# Patient Record
Sex: FEMALE | Race: WHITE | NOT HISPANIC OR LATINO | Employment: PART TIME | URBAN - METROPOLITAN AREA
[De-identification: names, ages, dates, MRNs, and addresses within clinical notes are randomized per-mention and may not be internally consistent; named-entity substitution may affect disease eponyms.]

---

## 2017-03-03 ENCOUNTER — TRANSCRIBE ORDERS (OUTPATIENT)
Dept: ADMINISTRATIVE | Facility: HOSPITAL | Age: 57
End: 2017-03-03

## 2017-03-03 ENCOUNTER — LAB (OUTPATIENT)
Dept: LAB | Facility: HOSPITAL | Age: 57
End: 2017-03-03

## 2017-03-03 ENCOUNTER — TRANSCRIBE ORDERS (OUTPATIENT)
Dept: LAB | Facility: HOSPITAL | Age: 57
End: 2017-03-03

## 2017-03-03 DIAGNOSIS — A31.0 PULMONARY DISEASES DUE TO OTHER MYCOBACTERIA: Primary | ICD-10-CM

## 2017-03-03 DIAGNOSIS — R19.7 DIARRHEA, UNSPECIFIED TYPE: ICD-10-CM

## 2017-03-03 DIAGNOSIS — R11.0 NAUSEA: ICD-10-CM

## 2017-03-03 DIAGNOSIS — R19.7 DIARRHEA, UNSPECIFIED TYPE: Primary | ICD-10-CM

## 2017-03-03 LAB
ALBUMIN SERPL-MCNC: 4.5 G/DL (ref 3.2–4.8)
ALBUMIN/GLOB SERPL: 1.6 G/DL (ref 1.5–2.5)
ALP SERPL-CCNC: 85 U/L (ref 25–100)
ALT SERPL W P-5'-P-CCNC: 21 U/L (ref 7–40)
ANION GAP SERPL CALCULATED.3IONS-SCNC: 5 MMOL/L (ref 3–11)
AST SERPL-CCNC: 23 U/L (ref 0–33)
BASOPHILS # BLD AUTO: 0.04 10*3/MM3 (ref 0–0.2)
BASOPHILS NFR BLD AUTO: 0.4 % (ref 0–1)
BILIRUB SERPL-MCNC: 0.2 MG/DL (ref 0.3–1.2)
BUN BLD-MCNC: 10 MG/DL (ref 9–23)
BUN/CREAT SERPL: 16.7 (ref 7–25)
CALCIUM SPEC-SCNC: 9.9 MG/DL (ref 8.7–10.4)
CHLORIDE SERPL-SCNC: 102 MMOL/L (ref 99–109)
CO2 SERPL-SCNC: 29 MMOL/L (ref 20–31)
CREAT BLD-MCNC: 0.6 MG/DL (ref 0.6–1.3)
DEPRECATED RDW RBC AUTO: 48.7 FL (ref 37–54)
EOSINOPHIL # BLD AUTO: 0.1 10*3/MM3 (ref 0.1–0.3)
EOSINOPHIL NFR BLD AUTO: 1 % (ref 0–3)
ERYTHROCYTE [DISTWIDTH] IN BLOOD BY AUTOMATED COUNT: 14.2 % (ref 11.3–14.5)
GFR SERPL CREATININE-BSD FRML MDRD: 104 ML/MIN/1.73
GLOBULIN UR ELPH-MCNC: 2.9 GM/DL
GLUCOSE BLD-MCNC: 71 MG/DL (ref 70–100)
HCT VFR BLD AUTO: 40 % (ref 34.5–44)
HGB BLD-MCNC: 13.4 G/DL (ref 11.5–15.5)
IMM GRANULOCYTES # BLD: 0.02 10*3/MM3 (ref 0–0.03)
IMM GRANULOCYTES NFR BLD: 0.2 % (ref 0–0.6)
LYMPHOCYTES # BLD AUTO: 3.96 10*3/MM3 (ref 0.6–4.8)
LYMPHOCYTES NFR BLD AUTO: 38.5 % (ref 24–44)
MCH RBC QN AUTO: 30.9 PG (ref 27–31)
MCHC RBC AUTO-ENTMCNC: 33.5 G/DL (ref 32–36)
MCV RBC AUTO: 92.4 FL (ref 80–99)
MONOCYTES # BLD AUTO: 0.7 10*3/MM3 (ref 0–1)
MONOCYTES NFR BLD AUTO: 6.8 % (ref 0–12)
NEUTROPHILS # BLD AUTO: 5.47 10*3/MM3 (ref 1.5–8.3)
NEUTROPHILS NFR BLD AUTO: 53.1 % (ref 41–71)
PLATELET # BLD AUTO: 426 10*3/MM3 (ref 150–450)
PMV BLD AUTO: 9.6 FL (ref 6–12)
POTASSIUM BLD-SCNC: 4.1 MMOL/L (ref 3.5–5.5)
PROT SERPL-MCNC: 7.4 G/DL (ref 5.7–8.2)
RBC # BLD AUTO: 4.33 10*6/MM3 (ref 3.89–5.14)
SODIUM BLD-SCNC: 136 MMOL/L (ref 132–146)
TSH SERPL DL<=0.05 MIU/L-ACNC: 1.96 MIU/ML (ref 0.35–5.35)
WBC NRBC COR # BLD: 10.29 10*3/MM3 (ref 3.5–10.8)

## 2017-03-03 PROCEDURE — 80050 GENERAL HEALTH PANEL: CPT | Performed by: INTERNAL MEDICINE

## 2017-03-03 PROCEDURE — 36415 COLL VENOUS BLD VENIPUNCTURE: CPT | Performed by: INTERNAL MEDICINE

## 2017-03-06 ENCOUNTER — HOSPITAL ENCOUNTER (OUTPATIENT)
Dept: CT IMAGING | Facility: HOSPITAL | Age: 57
Discharge: HOME | End: 2017-03-06
Admitting: INTERNAL MEDICINE

## 2017-03-06 DIAGNOSIS — A31.0 PULMONARY DISEASES DUE TO OTHER MYCOBACTERIA: ICD-10-CM

## 2017-03-06 PROCEDURE — 71250 CT THORAX DX C-: CPT

## 2017-06-27 ENCOUNTER — TRANSCRIBE ORDERS (OUTPATIENT)
Dept: ADMINISTRATIVE | Facility: HOSPITAL | Age: 57
End: 2017-06-27

## 2017-06-27 DIAGNOSIS — A31.0 MAIC (MYCOBACTERIUM AVIUM-INTRACELLULARE COMPLEX) (HCC): Primary | ICD-10-CM

## 2017-07-07 ENCOUNTER — APPOINTMENT (OUTPATIENT)
Dept: CT IMAGING | Facility: HOSPITAL | Age: 57
End: 2017-07-07

## 2017-07-10 ENCOUNTER — HOSPITAL ENCOUNTER (OUTPATIENT)
Dept: CT IMAGING | Facility: HOSPITAL | Age: 57
Discharge: HOME | End: 2017-07-10

## 2017-09-20 ENCOUNTER — HOSPITAL ENCOUNTER (OUTPATIENT)
Dept: CT IMAGING | Facility: HOSPITAL | Age: 57
Discharge: HOME | End: 2017-09-20
Admitting: INTERNAL MEDICINE

## 2017-09-20 DIAGNOSIS — A31.0 MAIC (MYCOBACTERIUM AVIUM-INTRACELLULARE COMPLEX) (HCC): ICD-10-CM

## 2017-09-20 PROCEDURE — 71250 CT THORAX DX C-: CPT

## 2017-11-15 ENCOUNTER — TRANSCRIBE ORDERS (OUTPATIENT)
Dept: ADMINISTRATIVE | Facility: HOSPITAL | Age: 57
End: 2017-11-15

## 2017-11-15 DIAGNOSIS — Z12.31 VISIT FOR SCREENING MAMMOGRAM: Primary | ICD-10-CM

## 2017-12-27 ENCOUNTER — HOSPITAL ENCOUNTER (OUTPATIENT)
Dept: MAMMOGRAPHY | Facility: HOSPITAL | Age: 57
Discharge: HOME | End: 2017-12-27

## 2018-01-24 ENCOUNTER — HOSPITAL ENCOUNTER (OUTPATIENT)
Dept: MAMMOGRAPHY | Facility: HOSPITAL | Age: 58
Discharge: HOME | End: 2018-01-24

## 2018-02-15 ENCOUNTER — TRANSCRIBE ORDERS (OUTPATIENT)
Dept: LAB | Facility: HOSPITAL | Age: 58
End: 2018-02-15

## 2018-02-15 ENCOUNTER — LAB (OUTPATIENT)
Dept: LAB | Facility: HOSPITAL | Age: 58
End: 2018-02-15

## 2018-02-15 DIAGNOSIS — A31.0 PULMONARY DISEASE DUE TO MYCOBACTERIA (HCC): Primary | ICD-10-CM

## 2018-02-15 DIAGNOSIS — A31.0 PULMONARY DISEASE DUE TO MYCOBACTERIA (HCC): ICD-10-CM

## 2018-02-15 LAB
ALBUMIN SERPL-MCNC: 4.2 G/DL (ref 3.2–4.8)
ALP SERPL-CCNC: 71 U/L (ref 25–100)
ALT SERPL W P-5'-P-CCNC: 18 U/L (ref 7–40)
AST SERPL-CCNC: 20 U/L (ref 0–33)
BILIRUB CONJ SERPL-MCNC: 0.1 MG/DL (ref 0–0.2)
BILIRUB INDIRECT SERPL-MCNC: 0.1 MG/DL (ref 0.1–1.1)
BILIRUB SERPL-MCNC: 0.2 MG/DL (ref 0.3–1.2)
PROT SERPL-MCNC: 6.2 G/DL (ref 5.7–8.2)

## 2018-02-15 PROCEDURE — 36415 COLL VENOUS BLD VENIPUNCTURE: CPT

## 2018-02-15 PROCEDURE — 80076 HEPATIC FUNCTION PANEL: CPT

## 2018-03-06 ENCOUNTER — HOSPITAL ENCOUNTER (OUTPATIENT)
Dept: MAMMOGRAPHY | Facility: HOSPITAL | Age: 58
End: 2018-03-06

## 2018-04-05 ENCOUNTER — HOSPITAL ENCOUNTER (OUTPATIENT)
Dept: MAMMOGRAPHY | Facility: HOSPITAL | Age: 58
Discharge: HOME | End: 2018-04-05

## 2018-04-05 DIAGNOSIS — Z12.31 VISIT FOR SCREENING MAMMOGRAM: ICD-10-CM

## 2018-09-05 ENCOUNTER — TRANSCRIBE ORDERS (OUTPATIENT)
Dept: PREADMISSION TESTING | Facility: HOSPITAL | Age: 58
End: 2018-09-05

## 2018-09-05 DIAGNOSIS — J44.9 OBSTRUCTIVE CHRONIC BRONCHITIS WITHOUT EXACERBATION (HCC): Primary | ICD-10-CM

## 2018-10-22 ENCOUNTER — TRANSCRIBE ORDERS (OUTPATIENT)
Dept: ADMINISTRATIVE | Facility: HOSPITAL | Age: 58
End: 2018-10-22

## 2018-10-22 ENCOUNTER — HOSPITAL ENCOUNTER (OUTPATIENT)
Dept: GENERAL RADIOLOGY | Facility: HOSPITAL | Age: 58
Discharge: HOME | End: 2018-10-22
Admitting: INTERNAL MEDICINE

## 2018-10-22 DIAGNOSIS — R07.89 CHEST PAIN, ATYPICAL: Primary | ICD-10-CM

## 2018-10-22 PROCEDURE — 71046 X-RAY EXAM CHEST 2 VIEWS: CPT

## 2018-12-03 ENCOUNTER — APPOINTMENT (OUTPATIENT)
Dept: CT IMAGING | Facility: HOSPITAL | Age: 58
End: 2018-12-03

## 2018-12-07 ENCOUNTER — TRANSCRIBE ORDERS (OUTPATIENT)
Dept: ADMINISTRATIVE | Facility: HOSPITAL | Age: 58
End: 2018-12-07

## 2018-12-20 ENCOUNTER — TRANSCRIBE ORDERS (OUTPATIENT)
Dept: ADMINISTRATIVE | Facility: HOSPITAL | Age: 58
End: 2018-12-20

## 2018-12-20 DIAGNOSIS — J44.9 CHRONIC OBSTRUCTIVE PULMONARY DISEASE, UNSPECIFIED COPD TYPE (HCC): Primary | ICD-10-CM

## 2018-12-20 DIAGNOSIS — A31.9: ICD-10-CM

## 2018-12-27 ENCOUNTER — APPOINTMENT (OUTPATIENT)
Dept: CT IMAGING | Facility: HOSPITAL | Age: 58
End: 2018-12-27

## 2019-02-18 ENCOUNTER — APPOINTMENT (OUTPATIENT)
Dept: CT IMAGING | Facility: HOSPITAL | Age: 59
End: 2019-02-18

## 2019-02-25 ENCOUNTER — APPOINTMENT (OUTPATIENT)
Dept: CT IMAGING | Facility: HOSPITAL | Age: 59
End: 2019-02-25

## 2019-03-14 ENCOUNTER — HOSPITAL ENCOUNTER (OUTPATIENT)
Dept: GENERAL RADIOLOGY | Facility: HOSPITAL | Age: 59
Discharge: HOME | End: 2019-03-14
Admitting: NURSE PRACTITIONER

## 2019-03-14 ENCOUNTER — TRANSCRIBE ORDERS (OUTPATIENT)
Dept: ADMINISTRATIVE | Facility: HOSPITAL | Age: 59
End: 2019-03-14

## 2019-03-14 DIAGNOSIS — A31.9 MYCOBACTERIAL INFECTION: ICD-10-CM

## 2019-03-14 DIAGNOSIS — J44.9 CHRONIC OBSTRUCTIVE PULMONARY DISEASE, UNSPECIFIED COPD TYPE (HCC): Primary | ICD-10-CM

## 2019-03-14 PROCEDURE — 71046 X-RAY EXAM CHEST 2 VIEWS: CPT

## 2019-05-14 ENCOUNTER — TRANSCRIBE ORDERS (OUTPATIENT)
Dept: ADMINISTRATIVE | Facility: HOSPITAL | Age: 59
End: 2019-05-14

## 2019-05-14 DIAGNOSIS — R06.00 DYSPNEA, UNSPECIFIED TYPE: Primary | ICD-10-CM

## 2019-05-14 DIAGNOSIS — R06.02 SHORTNESS OF BREATH: ICD-10-CM

## 2019-05-14 DIAGNOSIS — A31.9 MYCOBACTERIOSIS: ICD-10-CM

## 2019-05-14 DIAGNOSIS — J43.9 EMPHYSEMATOUS BLEB (HCC): ICD-10-CM

## 2019-05-21 ENCOUNTER — HOSPITAL ENCOUNTER (OUTPATIENT)
Dept: CT IMAGING | Facility: HOSPITAL | Age: 59
Discharge: HOME | End: 2019-05-21
Admitting: NURSE PRACTITIONER

## 2019-05-21 DIAGNOSIS — J43.9 EMPHYSEMATOUS BLEB (HCC): ICD-10-CM

## 2019-05-21 DIAGNOSIS — A31.9 MYCOBACTERIOSIS: ICD-10-CM

## 2019-05-21 DIAGNOSIS — R06.02 SHORTNESS OF BREATH: ICD-10-CM

## 2019-05-21 DIAGNOSIS — R06.00 DYSPNEA, UNSPECIFIED TYPE: ICD-10-CM

## 2019-05-21 PROCEDURE — G0297 LDCT FOR LUNG CA SCREEN: HCPCS

## 2020-01-09 ENCOUNTER — TRANSCRIBE ORDERS (OUTPATIENT)
Dept: ADMINISTRATIVE | Facility: HOSPITAL | Age: 60
End: 2020-01-09

## 2020-01-09 DIAGNOSIS — A31.9 MYCOBACTERIOSIS: ICD-10-CM

## 2020-01-09 DIAGNOSIS — J43.9 PULMONARY EMPHYSEMA, UNSPECIFIED EMPHYSEMA TYPE (HCC): ICD-10-CM

## 2020-01-09 DIAGNOSIS — F17.210 CIGARETTE SMOKER: Primary | ICD-10-CM

## 2020-01-09 DIAGNOSIS — J47.9 ADULT BRONCHIECTASIS (HCC): Primary | ICD-10-CM

## 2020-01-09 DIAGNOSIS — J44.9 CHRONIC OBSTRUCTIVE PULMONARY DISEASE, UNSPECIFIED COPD TYPE (HCC): ICD-10-CM

## 2020-06-15 ENCOUNTER — HOSPITAL ENCOUNTER (OUTPATIENT)
Dept: CT IMAGING | Facility: HOSPITAL | Age: 60
Discharge: HOME | End: 2020-06-15

## 2020-06-15 DIAGNOSIS — F17.210 CIGARETTE SMOKER: ICD-10-CM

## 2020-06-23 ENCOUNTER — HOSPITAL ENCOUNTER (OUTPATIENT)
Dept: CT IMAGING | Facility: HOSPITAL | Age: 60
Discharge: HOME | End: 2020-06-23
Admitting: NURSE PRACTITIONER

## 2020-06-23 PROCEDURE — G0297 LDCT FOR LUNG CA SCREEN: HCPCS

## 2020-08-27 ENCOUNTER — LAB (OUTPATIENT)
Dept: LAB | Facility: HOSPITAL | Age: 60
End: 2020-08-27

## 2020-08-27 ENCOUNTER — TRANSCRIBE ORDERS (OUTPATIENT)
Dept: LAB | Facility: HOSPITAL | Age: 60
End: 2020-08-27

## 2020-08-27 DIAGNOSIS — A31.0 PULMONARY DISEASE DUE TO MYCOBACTERIA (HCC): Primary | ICD-10-CM

## 2020-08-27 DIAGNOSIS — A31.0 PULMONARY DISEASE DUE TO MYCOBACTERIA (HCC): ICD-10-CM

## 2020-08-27 LAB
ALBUMIN SERPL-MCNC: 4.1 G/DL (ref 3.5–5.2)
ALBUMIN/GLOB SERPL: 1.5 G/DL
ALP SERPL-CCNC: 88 U/L (ref 39–117)
ALT SERPL W P-5'-P-CCNC: 15 U/L (ref 1–33)
ANION GAP SERPL CALCULATED.3IONS-SCNC: 9.8 MMOL/L (ref 5–15)
AST SERPL-CCNC: 14 U/L (ref 1–32)
BASOPHILS # BLD AUTO: 0.05 10*3/MM3 (ref 0–0.2)
BASOPHILS NFR BLD AUTO: 0.6 % (ref 0–1.5)
BILIRUB SERPL-MCNC: 0.2 MG/DL (ref 0–1.2)
BUN SERPL-MCNC: 6 MG/DL (ref 6–20)
BUN/CREAT SERPL: 12.5 (ref 7–25)
CALCIUM SPEC-SCNC: 9.8 MG/DL (ref 8.6–10.5)
CHLORIDE SERPL-SCNC: 99 MMOL/L (ref 98–107)
CO2 SERPL-SCNC: 24.2 MMOL/L (ref 22–29)
CREAT SERPL-MCNC: 0.48 MG/DL (ref 0.57–1)
DEPRECATED RDW RBC AUTO: 46.3 FL (ref 37–54)
EOSINOPHIL # BLD AUTO: 0.25 10*3/MM3 (ref 0–0.4)
EOSINOPHIL NFR BLD AUTO: 2.9 % (ref 0.3–6.2)
ERYTHROCYTE [DISTWIDTH] IN BLOOD BY AUTOMATED COUNT: 14.1 % (ref 12.3–15.4)
GFR SERPL CREATININE-BSD FRML MDRD: 132 ML/MIN/1.73
GLOBULIN UR ELPH-MCNC: 2.8 GM/DL
GLUCOSE SERPL-MCNC: 89 MG/DL (ref 65–99)
HCT VFR BLD AUTO: 36.3 % (ref 34–46.6)
HGB BLD-MCNC: 12.4 G/DL (ref 12–15.9)
IMM GRANULOCYTES # BLD AUTO: 0.02 10*3/MM3 (ref 0–0.05)
IMM GRANULOCYTES NFR BLD AUTO: 0.2 % (ref 0–0.5)
LYMPHOCYTES # BLD AUTO: 2.55 10*3/MM3 (ref 0.7–3.1)
LYMPHOCYTES NFR BLD AUTO: 29.9 % (ref 19.6–45.3)
MCH RBC QN AUTO: 30.6 PG (ref 26.6–33)
MCHC RBC AUTO-ENTMCNC: 34.2 G/DL (ref 31.5–35.7)
MCV RBC AUTO: 89.6 FL (ref 79–97)
MONOCYTES # BLD AUTO: 0.79 10*3/MM3 (ref 0.1–0.9)
MONOCYTES NFR BLD AUTO: 9.3 % (ref 5–12)
NEUTROPHILS NFR BLD AUTO: 4.87 10*3/MM3 (ref 1.7–7)
NEUTROPHILS NFR BLD AUTO: 57.1 % (ref 42.7–76)
NRBC BLD AUTO-RTO: 0 /100 WBC (ref 0–0.2)
PLATELET # BLD AUTO: 447 10*3/MM3 (ref 140–450)
PMV BLD AUTO: 10.1 FL (ref 6–12)
POTASSIUM SERPL-SCNC: 4.3 MMOL/L (ref 3.5–5.2)
PROT SERPL-MCNC: 6.9 G/DL (ref 6–8.5)
RBC # BLD AUTO: 4.05 10*6/MM3 (ref 3.77–5.28)
SODIUM SERPL-SCNC: 133 MMOL/L (ref 136–145)
WBC # BLD AUTO: 8.53 10*3/MM3 (ref 3.4–10.8)

## 2020-08-27 PROCEDURE — 36415 COLL VENOUS BLD VENIPUNCTURE: CPT

## 2020-08-27 PROCEDURE — 85025 COMPLETE CBC W/AUTO DIFF WBC: CPT

## 2020-08-27 PROCEDURE — 80053 COMPREHEN METABOLIC PANEL: CPT

## 2021-03-03 ENCOUNTER — TRANSCRIBE ORDERS (OUTPATIENT)
Dept: ADMINISTRATIVE | Facility: HOSPITAL | Age: 61
End: 2021-03-03

## 2021-03-03 DIAGNOSIS — A31.0 MYCOBACTERIUM INTRACELLULARE INFECTION (HCC): Primary | ICD-10-CM

## 2021-03-03 DIAGNOSIS — J45.40 MODERATE PERSISTENT ASTHMA, UNCOMPLICATED: ICD-10-CM

## 2021-03-03 DIAGNOSIS — J43.9 PULMONARY EMPHYSEMA, UNSPECIFIED EMPHYSEMA TYPE (HCC): ICD-10-CM

## 2021-03-09 ENCOUNTER — HOSPITAL ENCOUNTER (OUTPATIENT)
Dept: CT IMAGING | Facility: HOSPITAL | Age: 61
Discharge: HOME | End: 2021-03-09
Admitting: NURSE PRACTITIONER

## 2021-03-09 DIAGNOSIS — J45.40 MODERATE PERSISTENT ASTHMA, UNCOMPLICATED: ICD-10-CM

## 2021-03-09 DIAGNOSIS — A31.0 MYCOBACTERIUM INTRACELLULARE INFECTION (HCC): ICD-10-CM

## 2021-03-09 DIAGNOSIS — J43.9 PULMONARY EMPHYSEMA, UNSPECIFIED EMPHYSEMA TYPE (HCC): ICD-10-CM

## 2021-03-09 PROCEDURE — 71250 CT THORAX DX C-: CPT

## 2021-03-24 ENCOUNTER — TRANSCRIBE ORDERS (OUTPATIENT)
Dept: NUTRITION | Facility: HOSPITAL | Age: 61
End: 2021-03-24

## 2021-03-24 DIAGNOSIS — R63.4 ABNORMAL WEIGHT LOSS: Primary | ICD-10-CM

## 2021-04-21 ENCOUNTER — HOSPITAL ENCOUNTER (OUTPATIENT)
Dept: NUTRITION | Facility: HOSPITAL | Age: 61
Setting detail: RECURRING SERIES
Discharge: HOME | End: 2021-04-21

## 2021-04-21 VITALS — HEIGHT: 66 IN | BODY MASS INDEX: 16.39 KG/M2 | WEIGHT: 102 LBS

## 2021-04-21 PROCEDURE — 97802 MEDICAL NUTRITION INDIV IN: CPT

## 2021-04-21 NOTE — CONSULTS
Adult Outpatient Nutrition  Assessment/PES    Patient Name:  Kristen Jean  YOB: 1960  MRN: [id number]    Assessment Date:  4/21/2021    Comments:      This medical referred consult was provided via telehealth as patient was unable to attend an in-office appointment today due to the COVID-19 crisis. Consent for treatment was given verbally. RD spent a total of 60 minutes with patient today.      Patient is a 60 year old seen today for an initial nutrition visit related to weight gain strategies. Patient reported she has struggled to gain weight her entire life and is seeking support to promote healthy weight gain. She reported a history of struggling with high cholesterol, so she wants to gain weight while keeping saturated fat and trans fat intake at a minimum. Patient shared she lives alone and has a h/o asthma, COPD emphysema, MAC infection in lungs, and GERD. She shared a majority of her meals are easy to prepare because she does not like to cooks. She reported she eats fast food about 1-2 times per month. Patient also reported she currently smokes 5-6 cigarettes daily and has for several years. She stated it has been recommended she quit smoking several times. Health literacy assessment completed prior to visit today and she demonstrated adequate health literacy.     24-hour dietary recall:   6:00 am: packet instant oatmeal, 1 container of activia yogurt, tbsp dried cranberries, 1 cup coffee with 2% milk  9:00 am: 1 scrambled egg, 1 tsp butter, 2 slices white toast with butter and honey  11:30 am: large salad with lettuce, black olives, celery, cabbage, and carrots, 3 tbsp ranch, 3-4 slices deli turkey, slice of                      swiss cheese, 2 slices bread, 2-3 oz potato chips  2:00 pm: slice sweet potato pie with cup coffee with 2% milk  6:00 pm: large salad with lettuce, black olives, celery, cabbage, and carrots, 3 tbsp ranch, 1/2 cup beans, 1 cup pasta with                 butter  8:00  pm: 4 sugar cookies with frosting, 4 fl oz milk  Typical day? YES   Estimated calorie intake: 2914 calories     Per dietary recall and patient interview, patient is eating about 2900 calories daily and struggling to gain weight. RD discussed several ideas to add additional heart healthy calories into her diet that are rich in lean protein and unsaturated fats. RD made the following suggestions: add 2 tbsp peanut butter or another nut butter into oatmeal, swap activity for a full fat greek yogurt, add 1/2-1 avocado onto toast with egg, swap potato chips for a higher calorie and higher protein snack, such as apple with peanut butter, full fat cheese stick, nuts, greek yogurt tuna fish packet with crackers. RD also suggested patient swap regular pasta for chickpea pasta to add more protein into diet. Patient suggested she could add avocado into her salad and drizzle more olive oil on top of her salad for additional calories. We also discussed adding beans or lentils into her salad for more protein. RD also suggested several protein shakes and high calorie meal replacement shakes the patient could try in-between meals. Patient stated the ideas given were very helpful and very realistic. Patient set her own goal and stated she feels confident she can accomplish it. RD encouraged patient to contact her with any needs prior to our the next visit.       Goals:  1) Add at least 1-2 servings of heart healthy fat and lean protein with every meal.     Total of 60 minutes spent with patient on nutrition counseling. Education based on [employer] Diet[employer] and Nutrition guidelines. Patient was provided with RD's contact information. Follow up visit is scheduled for 5/27/21 at 9:15 am. Thank you for this referral.    General Info     Row Name 04/21/21 7635       Today's Session    Person(s) attending today's session  Patient     Services Used Today?  No       General Information    How Well Do You Speak English?  very  "well    Preferred Language  English    Are you able to read and write English?  Yes    Lives With  alone          Anthropometrics     Row Name 04/21/21 1628 04/21/21 1621       Anthropometrics    Height  167.6 cm (66\")  167.6 cm (66\")    Weight  --  46.3 kg (102 lb)       Ideal Body Weight (IBW)    Ideal Body Weight (IBW) (kg)  59.58  59.58    % Ideal Body Weight  --  77.66       Body Mass Index (BMI)    BMI (kg/m2)  --  16.5        Nutritional Info/Activity     Row Name 04/21/21 1621       Nutritional Information    Have you had weight changes?  No    What is your desired body weight?  -- 120-125 lbs    Have you tried to lose weight before?  Yes    List programs tried, date, and success  Fast food and other high calorie foods with no success    Food Allergies  -- salmon    Supplemental Drinks/Foods/Additives  none    History of eating disorder?  No    What cultural diet influences are important for you to follow?  none    Do you have difficulty chewing food?  No    Functional Status  able to prepare meals;able to purchase food    How often during the day do you find yourself snacking?  rarely    How often do you eat out and where?  Fast food 1-2 times per month    Do you use Food Assistance programs (WIC, food stamps, food bank)?  no    Do you need information about Food Assistance programs?  no    How many times do you drink milk per day?  3    How many times do you eat fruit per day?  2    How many times do you eat vegetables per day?  2    How many times do you drink juice per day?  0    How many times do you eat candy/chocolates per day?  1    How many times do you eat baked goods per day?  2    How many times do you eat desserts per day?  2    How many times do you eat ice cream per day?  0    How many times do you eat snack foods per day?  0    How many diet sodas do you drink per day?  0    How many regular sodas do you drink per day?  0    How many times do you eat ethnic food per day?  0    How many times " "do you drink alcohol per day?  0    How many times do you have caffeine per day?  2    How many servings of artificial sweetner do you have per day?  0    How many meals do you eat each day?  -- 4-5    How many snacks do you eat each day?  0    What is the biggest challenge you have with your diet?  Other (comment) Feeling full    What type of support do you currently use to help you with your health issues?  Friends       Eating Environment    Eating environment  Alone       Physical Activity    Are you currently involved in an activity/exercise program?   Yes    Describe physical activity  Walking three times per week for about 30 minutes at a time    How would you rank exercise as an important health lifestyle practice?  9        Home Nutrition Report     Row Name 04/21/21 1621          Home Nutrition Report    Supplemental Drinks/Foods/Additives  none         Estimated/Assessed Needs     Row Name 04/21/21 1628 04/21/21 1621       Calculation Measurements    Weight Used For Calculations  46.3 kg (102 lb)  --    Height  167.6 cm (66\")  167.6 cm (66\")       Estimated/Assessed Needs    Additional Documentation  [place]-St. Jeor Equation (Group)  --       [place]-St. Jeor Equation    RMR ([place]-St. Jeor Equation)  1054.42  --    [place]-St. Jeor Activity Factors  1.2  --    Activity Factors ([place]-St. Jeor)  1265.304  --                Problem/Interventions:  Problem 1     Row Name 04/21/21 1628          Nutrition Diagnoses Problem 1    Problem 1  Underweight     Etiology (related to)  -- lifestyle     Signs/Symptoms (evidenced by)  BMI     BMI  16 - 16.9                 Intervention Goal     Row Name 04/21/21 1629          Intervention Goal    General  Meet nutritional needs for age/condition     Weight  Appropriate weight gain             Education/Evaluation     Row Name 04/21/21 1629          Education    Education  Advised regarding habits/behavior     Advised Regarding Habits/Behavior  Increased nutrient " density;Appropriate portions;Meal planning;Eating out;Eating pattern;Food choices;Snacks;Food prep;Use supplement;Weight gain strategy        Monitor/Evaluation    Monitor  PO intake;Weight     Education Follow-up  Reinforce PRN           Electronically signed by:  Desire Levine RD  04/21/21 16:30 EDT

## 2021-04-22 NOTE — ADDENDUM NOTE
Encounter addended by: Desire Levine RD on: 4/22/2021 10:36 AM   Actions taken: Clinical Note Signed

## 2021-05-27 ENCOUNTER — HOSPITAL ENCOUNTER (OUTPATIENT)
Dept: NUTRITION | Facility: HOSPITAL | Age: 61
Setting detail: RECURRING SERIES
Discharge: HOME | End: 2021-05-27

## 2021-05-27 NOTE — PROGRESS NOTES
Adult Outpatient Nutrition  Assessment/PES    Patient Name:  Kristen Jean  YOB: 1960  MRN: [id number]    This medical referred consult was provided via telehealth as patient was unable to attend an in-office appointment today due to the COVID-19 crisis. Consent for treatment was given verbally. RD spent a total of 30 minutes with patient today.     Pt reports today via phone for her 1 month follow up. She states that she implemented all of the suggestions from her initial visit and continues to not be able see any weight loss. She is frustrated as she feels like she is eating so much. She is continuing to decrease her cigarette usage, is planning to start nicorette soon to attempt cessation. RD spoke with pt about how with the current state of her health- a hx of COPD/emphysema and MAC lung infection, her metabolism is extremely increased as her body is working extra hard to allow her lungs to function properly and to breathe. This is a contributor to her rapid UWL. She agrees with this. RD spoke with pt about implementing a few new tips for weight loss. RD suggested Boost VHC as a shake to try, as well as some high calorie smoothies. Patient states she has been using nutrition shakes in place of water and milk in things like oatmeal or cereal. RD shared some very high calorie protein bars with pt as well to implement between meals as a quick way to increase the calorie intake. Resources sent out to patient today and she plans to begin implementing. RD spoke with pt about ensuring she is eating plenty of protein at her meals and snacks as we want to do what we can to preserve her muscle mass that is left and at the very least, maintain weight and not lose anymore. Patient verbalizes understanding. She plans to get started with these suggestions and has scheduled a continued follow up for 6/17/2021 @ 915 AM.     Goals from initial:    1. Add at least 1-2 servings of heart healthy fat and lean protein  with every meal-100% met       Electronically signed by:  Iesha Mahan RD  05/27/21 09:35 EDT

## 2021-07-12 ENCOUNTER — TRANSCRIBE ORDERS (OUTPATIENT)
Dept: ADMINISTRATIVE | Facility: HOSPITAL | Age: 61
End: 2021-07-12

## 2021-07-12 ENCOUNTER — HOSPITAL ENCOUNTER (OUTPATIENT)
Dept: GENERAL RADIOLOGY | Facility: HOSPITAL | Age: 61
Discharge: HOME | End: 2021-07-12
Admitting: INTERNAL MEDICINE

## 2021-07-12 DIAGNOSIS — M62.838 SPASM OF MUSCLE: ICD-10-CM

## 2021-07-12 DIAGNOSIS — M15.9 GENERALIZED OSTEOARTHRITIS: Primary | ICD-10-CM

## 2021-07-12 DIAGNOSIS — M25.572 PAIN IN JOINT, ANKLE AND FOOT, LEFT: ICD-10-CM

## 2021-07-12 DIAGNOSIS — M25.571 PAIN IN JOINT, ANKLE AND FOOT, RIGHT: ICD-10-CM

## 2021-07-12 PROCEDURE — 73610 X-RAY EXAM OF ANKLE: CPT

## 2021-09-09 ENCOUNTER — TRANSCRIBE ORDERS (OUTPATIENT)
Dept: ADMINISTRATIVE | Facility: HOSPITAL | Age: 61
End: 2021-09-09

## 2021-09-09 DIAGNOSIS — A31.0 MYCOBACTERIUM AVIUM INFECTION (HCC): ICD-10-CM

## 2021-09-09 DIAGNOSIS — R93.89 ABNORMAL CT OF THE CHEST: ICD-10-CM

## 2021-09-09 DIAGNOSIS — R93.89 ABNORMAL FINDINGS ON DIAGNOSTIC IMAGING OF OTHER SPECIFIED BODY STRUCTURES: Primary | ICD-10-CM

## 2021-10-19 ENCOUNTER — HOSPITAL ENCOUNTER (OUTPATIENT)
Dept: CT IMAGING | Facility: HOSPITAL | Age: 61
Discharge: HOME | End: 2021-10-19
Admitting: NURSE PRACTITIONER

## 2021-10-19 DIAGNOSIS — R93.89 ABNORMAL CT OF THE CHEST: ICD-10-CM

## 2021-10-19 DIAGNOSIS — A31.0 MYCOBACTERIUM AVIUM INFECTION (HCC): ICD-10-CM

## 2021-10-19 PROCEDURE — 71250 CT THORAX DX C-: CPT

## 2022-05-03 ENCOUNTER — TRANSCRIBE ORDERS (OUTPATIENT)
Dept: ADMINISTRATIVE | Facility: HOSPITAL | Age: 62
End: 2022-05-03

## 2022-05-03 DIAGNOSIS — A31.0 MYCOBACTERIUM AVIUM COMPLEX: Primary | ICD-10-CM

## 2022-05-25 ENCOUNTER — HOSPITAL ENCOUNTER (OUTPATIENT)
Dept: GENERAL RADIOLOGY | Facility: HOSPITAL | Age: 62
Discharge: HOME | End: 2022-05-25
Admitting: NURSE PRACTITIONER

## 2022-05-25 PROCEDURE — 71046 X-RAY EXAM CHEST 2 VIEWS: CPT

## 2022-05-31 ENCOUNTER — TRANSCRIBE ORDERS (OUTPATIENT)
Dept: ADMINISTRATIVE | Facility: HOSPITAL | Age: 62
End: 2022-05-31

## 2022-05-31 DIAGNOSIS — J44.9 ASTHMA-COPD OVERLAP SYNDROME: Primary | ICD-10-CM

## 2022-05-31 DIAGNOSIS — A31.0 MYCOBACTERIUM AVIUM COMPLEX: ICD-10-CM

## 2022-06-21 ENCOUNTER — HOSPITAL ENCOUNTER (OUTPATIENT)
Dept: CT IMAGING | Facility: HOSPITAL | Age: 62
Discharge: HOME | End: 2022-06-21
Admitting: NURSE PRACTITIONER

## 2022-06-21 DIAGNOSIS — A31.0 MYCOBACTERIUM AVIUM COMPLEX: ICD-10-CM

## 2022-06-21 DIAGNOSIS — J44.9 ASTHMA-COPD OVERLAP SYNDROME: ICD-10-CM

## 2022-06-21 PROCEDURE — 71250 CT THORAX DX C-: CPT

## 2022-08-08 ENCOUNTER — TRANSCRIBE ORDERS (OUTPATIENT)
Dept: ADMINISTRATIVE | Facility: HOSPITAL | Age: 62
End: 2022-08-08

## 2022-08-08 DIAGNOSIS — A31.0 MYCOBACTERIUM AVIUM COMPLEX: Primary | ICD-10-CM

## 2022-09-15 ENCOUNTER — TRANSCRIBE ORDERS (OUTPATIENT)
Dept: ADMINISTRATIVE | Facility: HOSPITAL | Age: 62
End: 2022-09-15

## 2022-09-15 DIAGNOSIS — R91.8 MULTIPLE LUNG NODULES: Primary | ICD-10-CM

## 2022-09-15 DIAGNOSIS — R91.8 MULTIPLE LUNG NODULES ON CT: ICD-10-CM

## 2022-09-15 DIAGNOSIS — R91.8 MULTIPLE LUNG NODULES ON CT: Primary | ICD-10-CM

## 2022-09-20 ENCOUNTER — HOSPITAL ENCOUNTER (OUTPATIENT)
Dept: CT IMAGING | Facility: HOSPITAL | Age: 62
Discharge: HOME | End: 2022-09-20
Admitting: NURSE PRACTITIONER

## 2022-09-20 DIAGNOSIS — R91.8 MULTIPLE LUNG NODULES ON CT: ICD-10-CM

## 2022-09-20 PROCEDURE — 71250 CT THORAX DX C-: CPT

## 2022-09-21 ENCOUNTER — TRANSCRIBE ORDERS (OUTPATIENT)
Dept: ADMINISTRATIVE | Facility: HOSPITAL | Age: 62
End: 2022-09-21

## 2022-09-21 DIAGNOSIS — R91.8 MULTIPLE LUNG NODULES ON CT: Primary | ICD-10-CM

## 2022-10-04 ENCOUNTER — HOSPITAL ENCOUNTER (OUTPATIENT)
Dept: CT IMAGING | Facility: HOSPITAL | Age: 62
Discharge: HOME | End: 2022-10-04
Admitting: PHYSICIAN ASSISTANT

## 2022-10-04 ENCOUNTER — TRANSCRIBE ORDERS (OUTPATIENT)
Dept: ADMINISTRATIVE | Facility: HOSPITAL | Age: 62
End: 2022-10-04

## 2022-10-04 DIAGNOSIS — R10.31 CHRONIC RLQ PAIN: Primary | ICD-10-CM

## 2022-10-04 DIAGNOSIS — R10.31 CHRONIC RLQ PAIN: ICD-10-CM

## 2022-10-04 DIAGNOSIS — G89.29 CHRONIC RLQ PAIN: Primary | ICD-10-CM

## 2022-10-04 DIAGNOSIS — G89.29 CHRONIC RLQ PAIN: ICD-10-CM

## 2022-10-04 PROCEDURE — 0 DIATRIZOATE MEGLUMINE & SODIUM PER 1 ML: Performed by: PHYSICIAN ASSISTANT

## 2022-10-04 PROCEDURE — 74176 CT ABD & PELVIS W/O CONTRAST: CPT

## 2022-10-04 RX ADMIN — DIATRIZOATE MEGLUMINE AND DIATRIZOATE SODIUM 15 ML: 660; 100 LIQUID ORAL; RECTAL at 14:40

## 2022-11-08 ENCOUNTER — TRANSCRIBE ORDERS (OUTPATIENT)
Dept: ADMINISTRATIVE | Facility: HOSPITAL | Age: 62
End: 2022-11-08

## 2022-11-08 DIAGNOSIS — A31.0 MYCOBACTERIUM AVIUM COMPLEX: Primary | ICD-10-CM

## 2022-11-08 DIAGNOSIS — R91.8 MULTIPLE LUNG NODULES ON CT: ICD-10-CM

## 2022-11-22 ENCOUNTER — APPOINTMENT (OUTPATIENT)
Dept: CT IMAGING | Facility: HOSPITAL | Age: 62
End: 2022-11-22

## 2022-12-02 ENCOUNTER — APPOINTMENT (OUTPATIENT)
Dept: CT IMAGING | Facility: HOSPITAL | Age: 62
End: 2022-12-02

## 2022-12-07 ENCOUNTER — APPOINTMENT (OUTPATIENT)
Dept: CT IMAGING | Facility: HOSPITAL | Age: 62
End: 2022-12-07

## 2022-12-09 ENCOUNTER — APPOINTMENT (OUTPATIENT)
Dept: CT IMAGING | Facility: HOSPITAL | Age: 62
End: 2022-12-09

## 2022-12-16 ENCOUNTER — HOSPITAL ENCOUNTER (OUTPATIENT)
Dept: CT IMAGING | Facility: HOSPITAL | Age: 62
Discharge: HOME | End: 2022-12-16

## 2022-12-16 DIAGNOSIS — R91.8 MULTIPLE LUNG NODULES ON CT: ICD-10-CM

## 2022-12-16 DIAGNOSIS — A31.0 MYCOBACTERIUM AVIUM COMPLEX: ICD-10-CM

## 2023-11-03 ENCOUNTER — HOSPITAL ENCOUNTER (OUTPATIENT)
Dept: GENERAL RADIOLOGY | Facility: HOSPITAL | Age: 63
Discharge: HOME | End: 2023-11-03
Admitting: INTERNAL MEDICINE
Payer: COMMERCIAL

## 2023-11-03 ENCOUNTER — TRANSCRIBE ORDERS (OUTPATIENT)
Dept: ADMINISTRATIVE | Facility: HOSPITAL | Age: 63
End: 2023-11-03
Payer: COMMERCIAL

## 2023-11-03 DIAGNOSIS — M54.2 CERVICALGIA: Primary | ICD-10-CM

## 2023-11-03 PROCEDURE — 72040 X-RAY EXAM NECK SPINE 2-3 VW: CPT

## 2025-01-09 ENCOUNTER — APPOINTMENT (OUTPATIENT)
Dept: CT IMAGING | Facility: HOSPITAL | Age: 65
DRG: 193 | End: 2025-01-09
Payer: COMMERCIAL

## 2025-01-09 ENCOUNTER — APPOINTMENT (OUTPATIENT)
Dept: GENERAL RADIOLOGY | Facility: HOSPITAL | Age: 65
DRG: 193 | End: 2025-01-09
Payer: COMMERCIAL

## 2025-01-09 ENCOUNTER — HOSPITAL ENCOUNTER (INPATIENT)
Facility: HOSPITAL | Age: 65
LOS: 11 days | Discharge: HOME | DRG: 193 | End: 2025-01-20
Attending: EMERGENCY MEDICINE | Admitting: STUDENT IN AN ORGANIZED HEALTH CARE EDUCATION/TRAINING PROGRAM
Payer: COMMERCIAL

## 2025-01-09 DIAGNOSIS — J44.9 END STAGE COPD: ICD-10-CM

## 2025-01-09 DIAGNOSIS — D72.825 BANDEMIA: ICD-10-CM

## 2025-01-09 DIAGNOSIS — J44.89 ASTHMA-COPD OVERLAP SYNDROME: ICD-10-CM

## 2025-01-09 DIAGNOSIS — D75.839 THROMBOCYTOSIS: ICD-10-CM

## 2025-01-09 DIAGNOSIS — I47.19 ATRIAL TACHYCARDIA: ICD-10-CM

## 2025-01-09 DIAGNOSIS — J43.2 CENTRILOBULAR EMPHYSEMA: ICD-10-CM

## 2025-01-09 DIAGNOSIS — J41.1 MUCOPURULENT CHRONIC BRONCHITIS: ICD-10-CM

## 2025-01-09 DIAGNOSIS — I70.0 AORTIC ATHEROSCLEROSIS: ICD-10-CM

## 2025-01-09 DIAGNOSIS — Z86.19 HISTORY OF MAC INFECTION: ICD-10-CM

## 2025-01-09 DIAGNOSIS — E03.9 HYPOTHYROIDISM (ACQUIRED): ICD-10-CM

## 2025-01-09 DIAGNOSIS — A31.0 MYCOBACTERIUM AVIUM COMPLEX: ICD-10-CM

## 2025-01-09 DIAGNOSIS — I50.32 HEART FAILURE WITH IMPROVED EJECTION FRACTION (HFIMPEF): ICD-10-CM

## 2025-01-09 DIAGNOSIS — E43 SEVERE PROTEIN-CALORIE MALNUTRITION: ICD-10-CM

## 2025-01-09 DIAGNOSIS — R64 CACHEXIA: ICD-10-CM

## 2025-01-09 DIAGNOSIS — J18.9 MULTIFOCAL PNEUMONIA: Primary | ICD-10-CM

## 2025-01-09 DIAGNOSIS — A31.9 MYCOBACTERIUM INFECTION: ICD-10-CM

## 2025-01-09 DIAGNOSIS — E87.1 HYPONATREMIA: ICD-10-CM

## 2025-01-09 DIAGNOSIS — J18.9 PNEUMONIA OF RIGHT UPPER LOBE DUE TO INFECTIOUS ORGANISM: ICD-10-CM

## 2025-01-09 DIAGNOSIS — I48.0 PAROXYSMAL ATRIAL FIBRILLATION: ICD-10-CM

## 2025-01-09 PROBLEM — J43.9 EMPHYSEMA OF LUNG: Status: ACTIVE | Noted: 2025-01-09

## 2025-01-09 PROBLEM — F41.9 ANXIETY DISORDER: Status: ACTIVE | Noted: 2025-01-09

## 2025-01-09 PROBLEM — E46 PROTEIN CALORIE MALNUTRITION: Status: ACTIVE | Noted: 2025-01-09

## 2025-01-09 PROBLEM — E87.6 HYPOKALEMIA: Status: ACTIVE | Noted: 2025-01-09

## 2025-01-09 PROBLEM — E11.9 T2DM (TYPE 2 DIABETES MELLITUS): Status: ACTIVE | Noted: 2025-01-09

## 2025-01-09 LAB
ALBUMIN SERPL-MCNC: 3.2 G/DL (ref 3.5–5.2)
ALBUMIN/GLOB SERPL: 1.1 G/DL
ALP SERPL-CCNC: 201 U/L (ref 39–117)
ALT SERPL W P-5'-P-CCNC: 21 U/L (ref 1–33)
ANION GAP SERPL CALCULATED.3IONS-SCNC: 10 MMOL/L (ref 5–15)
AST SERPL-CCNC: 29 U/L (ref 1–32)
BASOPHILS # BLD AUTO: 0.04 10*3/MM3 (ref 0–0.2)
BASOPHILS NFR BLD AUTO: 0.2 % (ref 0–1.5)
BILIRUB SERPL-MCNC: 0.3 MG/DL (ref 0–1.2)
BUN SERPL-MCNC: 5 MG/DL (ref 8–23)
BUN/CREAT SERPL: 16.1 (ref 7–25)
CALCIUM SPEC-SCNC: 9.1 MG/DL (ref 8.6–10.5)
CHLORIDE SERPL-SCNC: 90 MMOL/L (ref 98–107)
CHOLEST SERPL-MCNC: 144 MG/DL (ref 0–200)
CK SERPL-CCNC: 52 U/L (ref 20–180)
CO2 SERPL-SCNC: 27 MMOL/L (ref 22–29)
CREAT SERPL-MCNC: 0.31 MG/DL (ref 0.57–1)
CRP SERPL-MCNC: 13.13 MG/DL (ref 0–0.5)
DEPRECATED RDW RBC AUTO: 42.9 FL (ref 37–54)
EGFRCR SERPLBLD CKD-EPI 2021: 118.4 ML/MIN/1.73
EOSINOPHIL # BLD AUTO: 0.42 10*3/MM3 (ref 0–0.4)
EOSINOPHIL NFR BLD AUTO: 2.5 % (ref 0.3–6.2)
ERYTHROCYTE [DISTWIDTH] IN BLOOD BY AUTOMATED COUNT: 14.8 % (ref 12.3–15.4)
ERYTHROCYTE [SEDIMENTATION RATE] IN BLOOD: 113 MM/HR (ref 0–30)
GEN 5 1HR TROPONIN T REFLEX: 12 NG/L
GLOBULIN UR ELPH-MCNC: 2.9 GM/DL
GLUCOSE SERPL-MCNC: 131 MG/DL (ref 65–99)
HBA1C MFR BLD: 6 % (ref 4.8–5.6)
HCT VFR BLD AUTO: 30.4 % (ref 34–46.6)
HDLC SERPL-MCNC: 41 MG/DL (ref 40–60)
HGB BLD-MCNC: 10.1 G/DL (ref 12–15.9)
HOLD SPECIMEN: NORMAL
HOLD SPECIMEN: NORMAL
IMM GRANULOCYTES # BLD AUTO: 0.09 10*3/MM3 (ref 0–0.05)
IMM GRANULOCYTES NFR BLD AUTO: 0.5 % (ref 0–0.5)
LDLC SERPL CALC-MCNC: 88 MG/DL (ref 0–100)
LDLC/HDLC SERPL: 2.15 {RATIO}
LIPASE SERPL-CCNC: 18 U/L (ref 13–60)
LYMPHOCYTES # BLD AUTO: 1.74 10*3/MM3 (ref 0.7–3.1)
LYMPHOCYTES NFR BLD AUTO: 10.4 % (ref 19.6–45.3)
MCH RBC QN AUTO: 26.5 PG (ref 26.6–33)
MCHC RBC AUTO-ENTMCNC: 33.2 G/DL (ref 31.5–35.7)
MCV RBC AUTO: 79.8 FL (ref 79–97)
MONOCYTES # BLD AUTO: 1.5 10*3/MM3 (ref 0.1–0.9)
MONOCYTES NFR BLD AUTO: 9 % (ref 5–12)
NEUTROPHILS NFR BLD AUTO: 12.88 10*3/MM3 (ref 1.7–7)
NEUTROPHILS NFR BLD AUTO: 77.4 % (ref 42.7–76)
NRBC BLD AUTO-RTO: 0 /100 WBC (ref 0–0.2)
NT-PROBNP SERPL-MCNC: 264.7 PG/ML (ref 0–900)
PLATELET # BLD AUTO: 493 10*3/MM3 (ref 140–450)
PMV BLD AUTO: 9.1 FL (ref 6–12)
POTASSIUM SERPL-SCNC: 3.4 MMOL/L (ref 3.5–5.2)
PROCALCITONIN SERPL-MCNC: 0.09 NG/ML (ref 0–0.25)
PROT SERPL-MCNC: 6.1 G/DL (ref 6–8.5)
QT INTERVAL: 290 MS
QT INTERVAL: 322 MS
QTC INTERVAL: 395 MS
QTC INTERVAL: 408 MS
RBC # BLD AUTO: 3.81 10*6/MM3 (ref 3.77–5.28)
SODIUM SERPL-SCNC: 127 MMOL/L (ref 136–145)
T4 FREE SERPL-MCNC: 1.36 NG/DL (ref 0.92–1.68)
TRIGL SERPL-MCNC: 74 MG/DL (ref 0–150)
TROPONIN T NUMERIC DELTA: 1 NG/L
TROPONIN T SERPL HS-MCNC: 11 NG/L
TSH SERPL DL<=0.05 MIU/L-ACNC: 0.16 UIU/ML (ref 0.27–4.2)
VLDLC SERPL-MCNC: 15 MG/DL (ref 5–40)
WBC NRBC COR # BLD AUTO: 16.67 10*3/MM3 (ref 3.4–10.8)
WHOLE BLOOD HOLD COAG: NORMAL
WHOLE BLOOD HOLD SPECIMEN: NORMAL

## 2025-01-09 PROCEDURE — 83690 ASSAY OF LIPASE: CPT

## 2025-01-09 PROCEDURE — 85652 RBC SED RATE AUTOMATED: CPT

## 2025-01-09 PROCEDURE — 82550 ASSAY OF CK (CPK): CPT

## 2025-01-09 PROCEDURE — 80061 LIPID PANEL: CPT

## 2025-01-09 PROCEDURE — 94799 UNLISTED PULMONARY SVC/PX: CPT

## 2025-01-09 PROCEDURE — 94640 AIRWAY INHALATION TREATMENT: CPT

## 2025-01-09 PROCEDURE — 84439 ASSAY OF FREE THYROXINE: CPT

## 2025-01-09 PROCEDURE — 71045 X-RAY EXAM CHEST 1 VIEW: CPT

## 2025-01-09 PROCEDURE — 71250 CT THORAX DX C-: CPT

## 2025-01-09 PROCEDURE — 99285 EMERGENCY DEPT VISIT HI MDM: CPT

## 2025-01-09 PROCEDURE — 99223 1ST HOSP IP/OBS HIGH 75: CPT

## 2025-01-09 PROCEDURE — 84145 PROCALCITONIN (PCT): CPT

## 2025-01-09 PROCEDURE — 25010000002 CEFEPIME PER 500 MG: Performed by: EMERGENCY MEDICINE

## 2025-01-09 PROCEDURE — 83880 ASSAY OF NATRIURETIC PEPTIDE: CPT

## 2025-01-09 PROCEDURE — 93005 ELECTROCARDIOGRAM TRACING: CPT

## 2025-01-09 PROCEDURE — 83036 HEMOGLOBIN GLYCOSYLATED A1C: CPT

## 2025-01-09 PROCEDURE — 80050 GENERAL HEALTH PANEL: CPT

## 2025-01-09 PROCEDURE — 25010000002 LINEZOLID 600 MG/300ML SOLUTION: Performed by: EMERGENCY MEDICINE

## 2025-01-09 PROCEDURE — 36415 COLL VENOUS BLD VENIPUNCTURE: CPT

## 2025-01-09 PROCEDURE — 84484 ASSAY OF TROPONIN QUANT: CPT

## 2025-01-09 PROCEDURE — 86140 C-REACTIVE PROTEIN: CPT

## 2025-01-09 RX ORDER — LINEZOLID 2 MG/ML
600 INJECTION, SOLUTION INTRAVENOUS EVERY 12 HOURS
Status: DISCONTINUED | OUTPATIENT
Start: 2025-01-10 | End: 2025-01-11

## 2025-01-09 RX ORDER — IPRATROPIUM BROMIDE AND ALBUTEROL SULFATE 2.5; .5 MG/3ML; MG/3ML
3 SOLUTION RESPIRATORY (INHALATION)
Status: DISCONTINUED | OUTPATIENT
Start: 2025-01-09 | End: 2025-01-19

## 2025-01-09 RX ORDER — BENZONATATE 100 MG/1
100 CAPSULE ORAL 3 TIMES DAILY PRN
Status: DISCONTINUED | OUTPATIENT
Start: 2025-01-09 | End: 2025-01-20 | Stop reason: HOSPADM

## 2025-01-09 RX ORDER — BISACODYL 10 MG
10 SUPPOSITORY, RECTAL RECTAL DAILY PRN
Status: DISCONTINUED | OUTPATIENT
Start: 2025-01-09 | End: 2025-01-20 | Stop reason: HOSPADM

## 2025-01-09 RX ORDER — NITROGLYCERIN 0.4 MG/1
0.4 TABLET SUBLINGUAL
Status: DISCONTINUED | OUTPATIENT
Start: 2025-01-09 | End: 2025-01-20 | Stop reason: HOSPADM

## 2025-01-09 RX ORDER — SODIUM CHLORIDE, SODIUM LACTATE, POTASSIUM CHLORIDE, CALCIUM CHLORIDE 600; 310; 30; 20 MG/100ML; MG/100ML; MG/100ML; MG/100ML
100 INJECTION, SOLUTION INTRAVENOUS CONTINUOUS
Status: DISCONTINUED | OUTPATIENT
Start: 2025-01-09 | End: 2025-01-09

## 2025-01-09 RX ORDER — LINEZOLID 2 MG/ML
600 INJECTION, SOLUTION INTRAVENOUS ONCE
Status: COMPLETED | OUTPATIENT
Start: 2025-01-09 | End: 2025-01-09

## 2025-01-09 RX ORDER — PANTOPRAZOLE SODIUM 40 MG/1
40 TABLET, DELAYED RELEASE ORAL NIGHTLY
COMMUNITY

## 2025-01-09 RX ORDER — SODIUM CHLORIDE 0.9 % (FLUSH) 0.9 %
10 SYRINGE (ML) INJECTION EVERY 12 HOURS SCHEDULED
Status: DISCONTINUED | OUTPATIENT
Start: 2025-01-09 | End: 2025-01-20 | Stop reason: HOSPADM

## 2025-01-09 RX ORDER — SODIUM CHLORIDE 0.9 % (FLUSH) 0.9 %
10 SYRINGE (ML) INJECTION AS NEEDED
Status: DISCONTINUED | OUTPATIENT
Start: 2025-01-09 | End: 2025-01-20 | Stop reason: HOSPADM

## 2025-01-09 RX ORDER — AMOXICILLIN 250 MG
2 CAPSULE ORAL 2 TIMES DAILY PRN
Status: DISCONTINUED | OUTPATIENT
Start: 2025-01-09 | End: 2025-01-20 | Stop reason: HOSPADM

## 2025-01-09 RX ORDER — POTASSIUM CHLORIDE 1500 MG/1
40 TABLET, EXTENDED RELEASE ORAL EVERY 4 HOURS
Status: DISPENSED | OUTPATIENT
Start: 2025-01-09 | End: 2025-01-10

## 2025-01-09 RX ORDER — ASPIRIN 81 MG/1
324 TABLET, CHEWABLE ORAL ONCE
Status: DISCONTINUED | OUTPATIENT
Start: 2025-01-09 | End: 2025-01-11

## 2025-01-09 RX ORDER — ENOXAPARIN SODIUM 100 MG/ML
30 INJECTION SUBCUTANEOUS DAILY
Status: DISCONTINUED | OUTPATIENT
Start: 2025-01-10 | End: 2025-01-11

## 2025-01-09 RX ORDER — BISACODYL 5 MG/1
5 TABLET, DELAYED RELEASE ORAL DAILY PRN
Status: DISCONTINUED | OUTPATIENT
Start: 2025-01-09 | End: 2025-01-20 | Stop reason: HOSPADM

## 2025-01-09 RX ORDER — IPRATROPIUM BROMIDE AND ALBUTEROL SULFATE 2.5; .5 MG/3ML; MG/3ML
3 SOLUTION RESPIRATORY (INHALATION) 4 TIMES DAILY PRN
COMMUNITY

## 2025-01-09 RX ORDER — PANTOPRAZOLE SODIUM 40 MG/1
40 TABLET, DELAYED RELEASE ORAL NIGHTLY
Status: DISCONTINUED | OUTPATIENT
Start: 2025-01-09 | End: 2025-01-20 | Stop reason: HOSPADM

## 2025-01-09 RX ORDER — LORAZEPAM 0.5 MG/1
0.5 TABLET ORAL 2 TIMES DAILY PRN
COMMUNITY

## 2025-01-09 RX ORDER — POLYETHYLENE GLYCOL 3350 17 G/17G
17 POWDER, FOR SOLUTION ORAL DAILY PRN
Status: DISCONTINUED | OUTPATIENT
Start: 2025-01-09 | End: 2025-01-20 | Stop reason: HOSPADM

## 2025-01-09 RX ORDER — ACETAMINOPHEN 160 MG/5ML
650 SOLUTION ORAL EVERY 4 HOURS PRN
Status: DISCONTINUED | OUTPATIENT
Start: 2025-01-09 | End: 2025-01-20 | Stop reason: HOSPADM

## 2025-01-09 RX ORDER — BUDESONIDE AND FORMOTEROL FUMARATE DIHYDRATE 160; 4.5 UG/1; UG/1
2 AEROSOL RESPIRATORY (INHALATION)
Status: DISCONTINUED | OUTPATIENT
Start: 2025-01-09 | End: 2025-01-10

## 2025-01-09 RX ORDER — ACETAMINOPHEN 650 MG/1
650 SUPPOSITORY RECTAL EVERY 4 HOURS PRN
Status: DISCONTINUED | OUTPATIENT
Start: 2025-01-09 | End: 2025-01-20 | Stop reason: HOSPADM

## 2025-01-09 RX ORDER — ACETAMINOPHEN 325 MG/1
650 TABLET ORAL EVERY 4 HOURS PRN
Status: DISCONTINUED | OUTPATIENT
Start: 2025-01-09 | End: 2025-01-20 | Stop reason: HOSPADM

## 2025-01-09 RX ORDER — SODIUM CHLORIDE 9 MG/ML
40 INJECTION, SOLUTION INTRAVENOUS AS NEEDED
Status: DISCONTINUED | OUTPATIENT
Start: 2025-01-09 | End: 2025-01-20 | Stop reason: HOSPADM

## 2025-01-09 RX ORDER — ALBUTEROL SULFATE 90 UG/1
2 INHALANT RESPIRATORY (INHALATION)
Status: DISCONTINUED | OUTPATIENT
Start: 2025-01-09 | End: 2025-01-09

## 2025-01-09 RX ORDER — FLUTICASONE FUROATE AND VILANTEROL 200; 25 UG/1; UG/1
1 POWDER RESPIRATORY (INHALATION)
COMMUNITY

## 2025-01-09 RX ORDER — SODIUM CHLORIDE 9 MG/ML
100 INJECTION, SOLUTION INTRAVENOUS CONTINUOUS
Status: ACTIVE | OUTPATIENT
Start: 2025-01-10 | End: 2025-01-10

## 2025-01-09 RX ORDER — LORAZEPAM 0.5 MG/1
0.5 TABLET ORAL 2 TIMES DAILY PRN
Status: DISCONTINUED | OUTPATIENT
Start: 2025-01-09 | End: 2025-01-11

## 2025-01-09 RX ADMIN — PANTOPRAZOLE SODIUM 40 MG: 40 TABLET, DELAYED RELEASE ORAL at 22:07

## 2025-01-09 RX ADMIN — IPRATROPIUM BROMIDE AND ALBUTEROL SULFATE 3 ML: 2.5; .5 SOLUTION RESPIRATORY (INHALATION) at 22:01

## 2025-01-09 RX ADMIN — LORAZEPAM 0.5 MG: 0.5 TABLET ORAL at 22:07

## 2025-01-09 RX ADMIN — CEFEPIME 2000 MG: 2 INJECTION, POWDER, FOR SOLUTION INTRAVENOUS at 17:38

## 2025-01-09 RX ADMIN — POTASSIUM CHLORIDE 40 MEQ: 1500 TABLET, EXTENDED RELEASE ORAL at 22:07

## 2025-01-09 RX ADMIN — Medication 10 ML: at 22:08

## 2025-01-09 RX ADMIN — LINEZOLID 600 MG: 600 INJECTION, SOLUTION INTRAVENOUS at 17:39

## 2025-01-09 NOTE — ED PROVIDER NOTES
[place]    EMERGENCY DEPARTMENT ENCOUNTER      Pt Name: Kristen Jean  MRN: [id number]  YOB: 1960  Date of evaluation: 1/9/2025  Provider: Ezequiel Eaton DO    CHIEF COMPLAINT       Chief Complaint   Patient presents with    Shortness of Breath     HPI  Stated Reason for Visit: pt presenting today with complaints of soa with dx of MAC, asthma and COPD, also complaining of R substernal and pluritic pain. 7/10. pt currently on 6L baseline wears 3L. pt recieved due-neb with improvement. pain still present at this time. History Obtained From: EMS     HISTORY OF PRESENT ILLNESS  (Location/Symptom, Timing/Onset, Context/Setting, Quality, Duration, Modifying Factors, Severity.)   Kristen Jean is a 64 y.o. female who presents to the emergency department for evaluation with shortness of breath cough and congestion over the last 3 to 4 days.  She notes right sided substernal pain which is consistent with her underlying MAC when she has flares and nodules.  She follows upcoming with pulmonologist Dr. Estrada, as well as infectious disease through [facility].  She is on chronic rifampin Zithromax and Levaquin for her underlying MAC, wears 3 L nasal cannula at baseline.  Has been compliant with her medications.  She has mild sputum production without hemoptysis.  She denies any fever or chills, she notes generalized pain worse with deep inspiration.  She received DuoNeb therapy and route with some moderate relief.  Still has some right-sided chest discomfort.      Nursing notes were reviewed.      PAST MEDICAL HISTORY     Past Medical History:   Diagnosis Date    Breast injury          SURGICAL HISTORY       Past Surgical History:   Procedure Laterality Date    TOOTH EXTRACTION      1990 and 2002         CURRENT MEDICATIONS       Current Facility-Administered Medications:     aspirin chewable tablet 324 mg, 324 mg, Oral, Once, Emergency, Triage Protocol, MD    sodium chloride 0.9 % flush 10 mL, 10  mL, Intravenous, PRN, Emergency, Triage Protocol, MD    Current Outpatient Medications:     albuterol (PROVENTIL) (2.5 MG/3ML) 0.083% nebulizer solution,  INHALE THE CONTENTS OF ONE VIAL VIA NEBULIZER BY MOUTH EVERY 4 TO 6 HOURS AS NEEDED FOR WHEEZING, Disp: , Rfl:     ibuprofen (ADVIL,MOTRIN) 200 MG tablet, Take 200 mg by mouth every 6 (six) hours as needed for mild pain (1-3)., Disp: , Rfl:     SYMBICORT 80-4.5 MCG/ACT inhaler, , Disp: , Rfl:     ALLERGIES     Codeine, Fluticasone-salmeterol, and Penicillins    FAMILY HISTORY       Family History   Problem Relation Age of Onset    Dementia Father     Breast cancer Neg Hx     Ovarian cancer Neg Hx           SOCIAL HISTORY       Social History     Socioeconomic History    Marital status: Single   Tobacco Use    Smoking status: Every Day   Substance and Sexual Activity    Alcohol use: No         PHYSICAL EXAM    (up to 7 for level 4, 8 or more for level 5)     Vitals:    01/09/25 1822 01/09/25 1838 01/09/25 1842 01/09/25 1900   BP: 114/50 109/50 109/50 122/65   BP Location:       Patient Position:       Pulse: 93 96 99 94   Resp:       Temp:       TempSrc:       SpO2: 99% 99% 98% 98%   Weight:       Height:           Physical Exam  General : Patient is awake, alert, oriented, in no acute distress, nontoxic appearing  HEENT: Pupils are equally round, EOMI, conjunctivae clear  Neck: Neck is supple, full range of motion, trachea midline  Cardiac: Heart regular rate, rhythm, no murmurs, rubs, or gallops  Lungs: Lungs decreased breath sounds bilaterally, scattered rhonchi to bilateral bases, faint expiratory wheezes, oxygenation is 98% on 3 L nasal cannula.  Chest wall: There is no tenderness to palpation over the chest wall or over ribs  Abdomen: Abdomen is soft, nontender, nondistended. There are no firm or pulsatile masses, no rebound rigidity or guarding  Musculoskeletal: No peripheral edema, 5 out of 5 strength in all 4 extremities.  No focal muscle deficits are  appreciated  Neuro: Motor intact, sensory intact, level of consciousness is normal  Dermatology: Skin is warm and dry  Psych: Mentation is grossly normal, cognition is grossly normal. Affect is appropriate      DIAGNOSTIC RESULTS     EKG:  All EKGs are interpreted by the Emergency Department Physician who either signs or Co-signs this chart in the absence of a cardiologist.    ECG 12 Lead ED Triage Standing Order; Chest Pain   Final Result   Test Reason : ED Triage Standing Order~   Blood Pressure :   */*   mmHG   Vent. Rate :  97 BPM     Atrial Rate :  97 BPM      P-R Int : 146 ms          QRS Dur :  76 ms       QT Int : 322 ms       P-R-T Axes :  74  62  58 degrees     QTcB Int : 408 ms      Sinus rhythm with occasional premature ventricular complexes and premature   atrial complexes   Otherwise normal ECG   When compared with ECG of 09-Jan-2025 16:17,   premature ventricular complexes are now present   Confirmed by MICAH PERES MD ([id number]) on 1/9/2025 6:39:29 PM      Referred By: EDMD           Confirmed By: MICAH PERES MD      ECG 12 Lead ED Triage Standing Order; Chest Pain   Final Result   Test Reason : ED Triage Standing Order~   Blood Pressure :   */*   mmHG   Vent. Rate : 112 BPM     Atrial Rate : 112 BPM      P-R Int : 140 ms          QRS Dur :  68 ms       QT Int : 290 ms       P-R-T Axes :  79  43  64 degrees     QTcB Int : 395 ms         Sinus tachycardia with premature atrial complexes   Anterior infarct , age undetermined   Abnormal ECG   No previous ECGs available   Confirmed by MICAH PERES MD ([id number]) on 1/9/2025 4:27:17 PM      Referred By: ED           Confirmed By: MICAH PERES MD          RADIOLOGY:     [x] Radiologist's Report Reviewed:  CT Chest Without Contrast Diagnostic   Final Result   Impression:   1.Right lung multifocal pneumonia   2.Decrease in size of right lower lobe pulmonary nodule. No new suspicious nodule identified.   3.Other chronic findings as discussed.             Electronically Signed: Gustavo Proctor MD     1/9/2025 6:07 PM EST     Workstation ID: [id number]      XR Chest 1 View   Final Result   Impression:   1.Hyperinflated lungs with underlying COPD/emphysema.   2.Interstitial opacities within the right lung likely representing infectious or inflammatory process.   3.New bleb within the superior lateral aspect of the right upper lobe with surrounding thickening of the wall.            Electronically Signed: Pito Hutton     1/9/2025 5:14 PM EST     Workstation ID: [id number]          I ordered and independently reviewed the above noted radiographic studies.      I viewed images of chest x-ray which showed right-sided multiple opacities, inflammatory/infectious process per my independent interpretation.    See radiologist's dictation for official interpretation.        LABS:    I have reviewed and interpreted all of the currently available lab results from this visit (if applicable):  Results for orders placed or performed during the hospital encounter of 01/09/25   ECG 12 Lead ED Triage Standing Order; Chest Pain    Collection Time: 01/09/25  4:17 PM   Result Value Ref Range    QT Interval 290 ms    QTC Interval 395 ms   High Sensitivity Troponin T    Collection Time: 01/09/25  4:22 PM    Specimen: Blood   Result Value Ref Range    HS Troponin T 11 <14 ng/L   Comprehensive Metabolic Panel    Collection Time: 01/09/25  4:22 PM    Specimen: Blood   Result Value Ref Range    Glucose 131 (H) 65 - 99 mg/dL    BUN 5 (L) 8 - 23 mg/dL    Creatinine 0.31 (L) 0.57 - 1.00 mg/dL    Sodium 127 (L) 136 - 145 mmol/L    Potassium 3.4 (L) 3.5 - 5.2 mmol/L    Chloride 90 (L) 98 - 107 mmol/L    CO2 27.0 22.0 - 29.0 mmol/L    Calcium 9.1 8.6 - 10.5 mg/dL    Total Protein 6.1 6.0 - 8.5 g/dL    Albumin 3.2 (L) 3.5 - 5.2 g/dL    ALT (SGPT) 21 1 - 33 U/L    AST (SGOT) 29 1 - 32 U/L    Alkaline Phosphatase 201 (H) 39 - 117 U/L    Total Bilirubin 0.3 0.0 - 1.2 mg/dL    Globulin 2.9 gm/dL    A/G  Ratio 1.1 g/dL    BUN/Creatinine Ratio 16.1 7.0 - 25.0    Anion Gap 10.0 5.0 - 15.0 mmol/L    eGFR 118.4 >60.0 mL/min/1.73   Lipase    Collection Time: 01/09/25  4:22 PM    Specimen: Blood   Result Value Ref Range    Lipase 18 13 - 60 U/L   BNP    Collection Time: 01/09/25  4:22 PM    Specimen: Blood   Result Value Ref Range    proBNP 264.7 0.0 - 900.0 pg/mL   CBC Auto Differential    Collection Time: 01/09/25  4:22 PM    Specimen: Blood   Result Value Ref Range    WBC 16.67 (H) 3.40 - 10.80 10*3/mm3    RBC 3.81 3.77 - 5.28 10*6/mm3    Hemoglobin 10.1 (L) 12.0 - 15.9 g/dL    Hematocrit 30.4 (L) 34.0 - 46.6 %    MCV 79.8 79.0 - 97.0 fL    MCH 26.5 (L) 26.6 - 33.0 pg    MCHC 33.2 31.5 - 35.7 g/dL    RDW 14.8 12.3 - 15.4 %    RDW-SD 42.9 37.0 - 54.0 fl    MPV 9.1 6.0 - 12.0 fL    Platelets 493 (H) 140 - 450 10*3/mm3    Neutrophil % 77.4 (H) 42.7 - 76.0 %    Lymphocyte % 10.4 (L) 19.6 - 45.3 %    Monocyte % 9.0 5.0 - 12.0 %    Eosinophil % 2.5 0.3 - 6.2 %    Basophil % 0.2 0.0 - 1.5 %    Immature Grans % 0.5 0.0 - 0.5 %    Neutrophils, Absolute 12.88 (H) 1.70 - 7.00 10*3/mm3    Lymphocytes, Absolute 1.74 0.70 - 3.10 10*3/mm3    Monocytes, Absolute 1.50 (H) 0.10 - 0.90 10*3/mm3    Eosinophils, Absolute 0.42 (H) 0.00 - 0.40 10*3/mm3    Basophils, Absolute 0.04 0.00 - 0.20 10*3/mm3    Immature Grans, Absolute 0.09 (H) 0.00 - 0.05 10*3/mm3    nRBC 0.0 0.0 - 0.2 /100 WBC   Green Top (Gel)    Collection Time: 01/09/25  4:22 PM   Result Value Ref Range    Extra Tube Hold for add-ons.    Lavender Top    Collection Time: 01/09/25  4:22 PM   Result Value Ref Range    Extra Tube hold for add-on    Gold Top - SST    Collection Time: 01/09/25  4:22 PM   Result Value Ref Range    Extra Tube Hold for add-ons.    Light Blue Top    Collection Time: 01/09/25  4:22 PM   Result Value Ref Range    Extra Tube Hold for add-ons.    ECG 12 Lead ED Triage Standing Order; Chest Pain    Collection Time: 01/09/25  6:00 PM   Result Value Ref Range     QT Interval 322 ms    QTC Interval 408 ms   High Sensitivity Troponin T 1Hr    Collection Time: 01/09/25  6:07 PM    Specimen: Blood   Result Value Ref Range    HS Troponin T 12 <14 ng/L    Troponin T Numeric Delta 1 Abnormal if >/=3 ng/L        If labs were ordered, I independently reviewed the results and considered them in treating the patient.      EMERGENCY DEPARTMENT COURSE and DIFFERENTIAL DIAGNOSIS/MDM:   Vitals:  AS OF 19:12 EST    BP - 122/65  HR - 94  TEMP - 98.2 °F (36.8 °C) (Oral)  O2 SATS - 98%      Orders placed during this visit:  Orders Placed This Encounter   Procedures    XR Chest 1 View    CT Chest Without Contrast Diagnostic    [place] Draw    High Sensitivity Troponin T    Comprehensive Metabolic Panel    Lipase    BNP    CBC Auto Differential    High Sensitivity Troponin T 1Hr    NPO Diet NPO Type: Strict NPO    Undress & Gown    Continuous Pulse Oximetry    Oxygen Therapy- Nasal Cannula; Titrate 1-6 LPM Per SpO2; 90 - 95%    ECG 12 Lead ED Triage Standing Order; Chest Pain    ECG 12 Lead ED Triage Standing Order; Chest Pain    Insert Peripheral IV    CBC & Differential    Green Top (Gel)    Lavender Top    Gold Top - SST    Light Blue Top       All labs have been independently reviewed by me.  All radiology studies have been reviewed by me and the radiologist dictating the report.  All EKG's have been independently viewed and interpreted by me.      Discussion below represents my analysis of pertinent findings related to patient's condition, differential diagnosis, treatment plan and final disposition.    Differential diagnosis:  The differential diagnosis associated with the patient's presentation includes: Reactive airway disease, obstructive lung disease, MAC pneumonia, pleurisy    Additional sources  Discussed/ obtained information from independent historians:   [] Spouse  [] Parent  [] Family member  [] Friend  [x] EMS   [] Other:    External (non-ED) record review:   [] Inpatient  record:   [x] Office record: Virginia Malagon MD Infectious Disease at : Patient positive for MAC, has had some multiple medication changes, currently on azithromycin, Levaquin, rifampin   [] Outpatient record:   [] Prior Outpatient labs:   [] Prior Outpatient radiology:   [] Primary Care record:   [] Outside ED record:   [] Other:     Patient's care impacted by:   [] Diabetes  [] Hypertension  [] CHF  [] Hyperlipidemia  [] Coronary Artery Disease   [x] COPD   [] Cancer   [] Tobacco Abuse   [] Substance Abuse    [x] Other: MAC    Care significantly affected by Social Determinants of Health (housing and economic circumstances, unemployment)    [] Yes     [x] No   If yes, Patient's care significantly limited by Social Determinants of Health including:   [] Inadequate housing   [] Low income   [] Alcoholism and drug addiction in family   [] Problems related to primary support group   [] Unemployment   [] Problems related to employment   [] Other Social Determinants of Health:       MEDICATIONS ADMINISTERED IN ED:  Medications   sodium chloride 0.9 % flush 10 mL (has no administration in time range)   aspirin chewable tablet 324 mg (324 mg Oral Not Given 1/9/25 1744)   Linezolid (ZYVOX) 600 mg 300 mL (0 mg Intravenous Stopped 1/9/25 1843)   cefepime 2000 mg IVPB in 100 mL NS (MBP) (0 mg Intravenous Stopped 1/9/25 1817)              This is a pleasant 54 old female with a history of respiratory disease, 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  With her known history of MAC we did obtain blood work labs, CT scan of the chest for further assessment, results as above.  Labs have a CBC white count of 16.6 with a hemoglobin of 10, positive left shift.  Creatinine 0.31, sodium 127 with a glucose of 131.  Patient has normal cardiac function, imaging including  chest x-ray and CT scan reveals a right sided inflammatory multifocal process likely underlying recurrent pneumonia with a positive below, potentially postobstructive infection.  After further discussion with pharmacy we discussed moving forward with cefepime, Zyvox she has been previously treated for MAC with chronic antibiotics including rifampin, Zithromax and Levaquin.  Patient is requiring couple liters and increased nasal cannula oxygen compared to her baseline.  We discussed giving her extra oxygen demand, need for stronger IV antibiotics likely pulmonary infectious disease consultation we will plan for admission to the hospital for further workup and treatment.  Case discussed with hospitalist Dr. Zaldivar for admission.        PROCEDURES:  Procedures    CRITICAL CARE TIME    Total Critical Care time was 0 minutes, excluding separately reportable procedures.   There was a high probability of clinically significant/life threatening deterioration in the patient's condition which required my urgent intervention.      FINAL IMPRESSION      1. Multifocal pneumonia    2. History of MAC infection    3. Bandemia    4. Hyponatremia          DISPOSITION/PLAN     ED Disposition       ED Disposition   Decision to Admit    Condition   --    Comment   --               Comment: Please note this report has been produced using speech recognition software.      Ezequiel Eaton DO  Attending Emergency Physician         Ezequiel Eaton DO  01/09/25 [id number]

## 2025-01-10 LAB
B PARAPERT DNA SPEC QL NAA+PROBE: NOT DETECTED
B PERT DNA SPEC QL NAA+PROBE: NOT DETECTED
BILIRUB UR QL STRIP: NEGATIVE
C PNEUM DNA NPH QL NAA+NON-PROBE: NOT DETECTED
CLARITY UR: CLEAR
COLOR UR: YELLOW
FLUAV SUBTYP SPEC NAA+PROBE: NOT DETECTED
FLUBV RNA ISLT QL NAA+PROBE: NOT DETECTED
GLUCOSE UR STRIP-MCNC: NEGATIVE MG/DL
HADV DNA SPEC NAA+PROBE: NOT DETECTED
HCOV 229E RNA SPEC QL NAA+PROBE: NOT DETECTED
HCOV HKU1 RNA SPEC QL NAA+PROBE: NOT DETECTED
HCOV NL63 RNA SPEC QL NAA+PROBE: NOT DETECTED
HCOV OC43 RNA SPEC QL NAA+PROBE: NOT DETECTED
HGB UR QL STRIP.AUTO: NEGATIVE
HMPV RNA NPH QL NAA+NON-PROBE: NOT DETECTED
HPIV1 RNA ISLT QL NAA+PROBE: NOT DETECTED
HPIV2 RNA SPEC QL NAA+PROBE: NOT DETECTED
HPIV3 RNA NPH QL NAA+PROBE: NOT DETECTED
HPIV4 P GENE NPH QL NAA+PROBE: NOT DETECTED
KETONES UR QL STRIP: NEGATIVE
L PNEUMO1 AG UR QL IA: NEGATIVE
LEUKOCYTE ESTERASE UR QL STRIP.AUTO: NEGATIVE
M PNEUMO IGG SER IA-ACNC: NOT DETECTED
MRSA DNA SPEC QL NAA+PROBE: NEGATIVE
NITRITE UR QL STRIP: NEGATIVE
OSMOLALITY UR: 227 MOSM/KG (ref 300–1100)
PH UR STRIP.AUTO: 7 [PH] (ref 5–8)
PROT UR QL STRIP: NEGATIVE
RHINOVIRUS RNA SPEC NAA+PROBE: NOT DETECTED
RSV RNA NPH QL NAA+NON-PROBE: NOT DETECTED
S PNEUM AG SPEC QL LA: NEGATIVE
SARS-COV-2 RNA NPH QL NAA+NON-PROBE: NOT DETECTED
SODIUM UR-SCNC: 29 MMOL/L
SP GR UR STRIP: 1.01 (ref 1–1.03)
UROBILINOGEN UR QL STRIP: NORMAL

## 2025-01-10 PROCEDURE — 25810000003 SODIUM CHLORIDE 0.9 % SOLUTION

## 2025-01-10 PROCEDURE — 94664 DEMO&/EVAL PT USE INHALER: CPT

## 2025-01-10 PROCEDURE — 94761 N-INVAS EAR/PLS OXIMETRY MLT: CPT

## 2025-01-10 PROCEDURE — 0202U NFCT DS 22 TRGT SARS-COV-2: CPT

## 2025-01-10 PROCEDURE — 25010000002 CEFTRIAXONE PER 250 MG: Performed by: PHYSICIAN ASSISTANT

## 2025-01-10 PROCEDURE — 97161 PT EVAL LOW COMPLEX 20 MIN: CPT

## 2025-01-10 PROCEDURE — 99233 SBSQ HOSP IP/OBS HIGH 50: CPT | Performed by: INTERNAL MEDICINE

## 2025-01-10 PROCEDURE — 99222 1ST HOSP IP/OBS MODERATE 55: CPT | Performed by: INTERNAL MEDICINE

## 2025-01-10 PROCEDURE — 83935 ASSAY OF URINE OSMOLALITY: CPT

## 2025-01-10 PROCEDURE — 97530 THERAPEUTIC ACTIVITIES: CPT

## 2025-01-10 PROCEDURE — 94799 UNLISTED PULMONARY SVC/PX: CPT

## 2025-01-10 PROCEDURE — 25010000002 MEROPENEM PER 100 MG

## 2025-01-10 PROCEDURE — 25010000002 LINEZOLID 600 MG/300ML SOLUTION

## 2025-01-10 PROCEDURE — 94640 AIRWAY INHALATION TREATMENT: CPT

## 2025-01-10 PROCEDURE — 84300 ASSAY OF URINE SODIUM: CPT

## 2025-01-10 PROCEDURE — 87641 MR-STAPH DNA AMP PROBE: CPT

## 2025-01-10 PROCEDURE — 87449 NOS EACH ORGANISM AG IA: CPT

## 2025-01-10 PROCEDURE — 81003 URINALYSIS AUTO W/O SCOPE: CPT

## 2025-01-10 PROCEDURE — 97165 OT EVAL LOW COMPLEX 30 MIN: CPT

## 2025-01-10 PROCEDURE — 63710000001 PREDNISONE PER 1 MG: Performed by: INTERNAL MEDICINE

## 2025-01-10 RX ORDER — ALBUTEROL SULFATE 0.83 MG/ML
2.5 SOLUTION RESPIRATORY (INHALATION) EVERY 4 HOURS PRN
Status: DISCONTINUED | OUTPATIENT
Start: 2025-01-10 | End: 2025-01-20 | Stop reason: HOSPADM

## 2025-01-10 RX ORDER — FLUTICASONE FUROATE AND VILANTEROL 200; 25 UG/1; UG/1
1 POWDER RESPIRATORY (INHALATION)
Status: DISCONTINUED | OUTPATIENT
Start: 2025-01-10 | End: 2025-01-19

## 2025-01-10 RX ORDER — AZITHROMYCIN 500 MG/1
500 TABLET, FILM COATED ORAL DAILY
COMMUNITY
End: 2025-01-20 | Stop reason: HOSPADM

## 2025-01-10 RX ORDER — RIFAMPIN 300 MG/1
300 CAPSULE ORAL EVERY OTHER DAY
COMMUNITY
End: 2025-01-20 | Stop reason: HOSPADM

## 2025-01-10 RX ORDER — PREDNISONE 20 MG/1
20 TABLET ORAL
Status: COMPLETED | OUTPATIENT
Start: 2025-01-10 | End: 2025-01-19

## 2025-01-10 RX ORDER — LORAZEPAM 0.5 MG/1
0.5 TABLET ORAL EVERY 12 HOURS SCHEDULED
Status: DISCONTINUED | OUTPATIENT
Start: 2025-01-10 | End: 2025-01-10

## 2025-01-10 RX ORDER — IPRATROPIUM BROMIDE AND ALBUTEROL SULFATE 2.5; .5 MG/3ML; MG/3ML
3 SOLUTION RESPIRATORY (INHALATION) ONCE
Status: COMPLETED | OUTPATIENT
Start: 2025-01-10 | End: 2025-01-10

## 2025-01-10 RX ORDER — DOXYCYCLINE 100 MG/1
100 CAPSULE ORAL EVERY 12 HOURS SCHEDULED
Status: DISCONTINUED | OUTPATIENT
Start: 2025-01-10 | End: 2025-01-11

## 2025-01-10 RX ADMIN — MEROPENEM 1000 MG: 1 INJECTION, POWDER, FOR SOLUTION INTRAVENOUS at 08:10

## 2025-01-10 RX ADMIN — MEROPENEM 1000 MG: 1 INJECTION, POWDER, FOR SOLUTION INTRAVENOUS at 01:23

## 2025-01-10 RX ADMIN — IPRATROPIUM BROMIDE AND ALBUTEROL SULFATE 3 ML: 2.5; .5 SOLUTION RESPIRATORY (INHALATION) at 19:29

## 2025-01-10 RX ADMIN — BENZONATATE 100 MG: 100 CAPSULE ORAL at 08:40

## 2025-01-10 RX ADMIN — SODIUM CHLORIDE 100 ML/HR: 9 INJECTION, SOLUTION INTRAVENOUS at 01:24

## 2025-01-10 RX ADMIN — IPRATROPIUM BROMIDE AND ALBUTEROL SULFATE 3 ML: 2.5; .5 SOLUTION RESPIRATORY (INHALATION) at 10:24

## 2025-01-10 RX ADMIN — LINEZOLID 600 MG: 600 INJECTION, SOLUTION INTRAVENOUS at 16:11

## 2025-01-10 RX ADMIN — LORAZEPAM 0.5 MG: 0.5 TABLET ORAL at 19:58

## 2025-01-10 RX ADMIN — BENZONATATE 100 MG: 100 CAPSULE ORAL at 23:27

## 2025-01-10 RX ADMIN — FLUTICASONE FUROATE AND VILANTEROL 1 PUFF: 200; 25 POWDER RESPIRATORY (INHALATION) at 19:30

## 2025-01-10 RX ADMIN — MEROPENEM 1000 MG: 1 INJECTION, POWDER, FOR SOLUTION INTRAVENOUS at 14:26

## 2025-01-10 RX ADMIN — LINEZOLID 600 MG: 600 INJECTION, SOLUTION INTRAVENOUS at 05:59

## 2025-01-10 RX ADMIN — BENZONATATE 100 MG: 100 CAPSULE ORAL at 16:11

## 2025-01-10 RX ADMIN — Medication 10 ML: at 23:36

## 2025-01-10 RX ADMIN — Medication 10 ML: at 08:11

## 2025-01-10 RX ADMIN — IPRATROPIUM BROMIDE AND ALBUTEROL SULFATE 3 ML: 2.5; .5 SOLUTION RESPIRATORY (INHALATION) at 16:11

## 2025-01-10 RX ADMIN — Medication: at 23:37

## 2025-01-10 RX ADMIN — SODIUM CHLORIDE 2000 MG: 900 INJECTION INTRAVENOUS at 17:10

## 2025-01-10 RX ADMIN — LORAZEPAM 0.5 MG: 0.5 TABLET ORAL at 14:26

## 2025-01-10 RX ADMIN — IPRATROPIUM BROMIDE AND ALBUTEROL SULFATE 3 ML: 2.5; .5 SOLUTION RESPIRATORY (INHALATION) at 03:57

## 2025-01-10 RX ADMIN — PREDNISONE 20 MG: 20 TABLET ORAL at 16:11

## 2025-01-10 RX ADMIN — PANTOPRAZOLE SODIUM 40 MG: 40 TABLET, DELAYED RELEASE ORAL at 19:57

## 2025-01-10 NOTE — THERAPY EVALUATION
Patient Name: Kristen Jean  : 1960    MRN: [id number]                              Today's Date: 1/10/2025       Admit Date: 2025    Visit Dx:     ICD-10-CM ICD-9-CM   1. Multifocal pneumonia  J18.9 486   2. History of MAC infection  Z86.19 V12.09   3. Bandemia  D72.825 288.66   4. Hyponatremia  E87.1 276.1     Patient Active Problem List   Diagnosis    Chronic bronchitis    Uncomplicated asthma    Mycobacterium infection    Pneumonia    Emphysema of lung    Asthma-COPD overlap syndrome    Anxiety disorder    Multifocal pneumonia    Hyponatremia    Hypokalemia    Protein calorie malnutrition    Cachexia    End stage COPD    Hypothyroidism (acquired)    Mycobacterium avium complex    T2DM (type 2 diabetes mellitus)     Past Medical History:   Diagnosis Date    Breast injury      Past Surgical History:   Procedure Laterality Date    TOOTH EXTRACTION       and       General Information       Row Name 01/10/25 1109          Physical Therapy Time and Intention    Document Type evaluation  -LO     Mode of Treatment individual therapy;physical therapy  -LO       Row Name 01/10/25 1109          General Information    Patient Profile Reviewed yes  -LO     Prior Level of Function independent:;all household mobility;ADL's;driving;community mobility;gait;transfer;bed mobility  no AD at baseline  -LO     Existing Precautions/Restrictions orthostatic hypotension;oxygen therapy device and L/min  monitor O2, BP  -LO     Barriers to Rehab medically complex;previous functional deficit;ineffective coping  -LO       Row Name 01/10/25 1109          Living Environment    People in Home alone  -LO       Row Name 01/10/25 1109          Home Main Entrance    Number of Stairs, Main Entrance none  -LO       Row Name 01/10/25 1109          Stairs Within Home, Primary    Number of Stairs, Within Home, Primary none  -LO       Row Name 01/10/25 1109          Cognition    Orientation Status (Cognition) oriented x 4  -LO        Row Name 01/10/25 1109          Safety Issues/Impairments Affecting Functional Mobility    Safety Issues Affecting Function (Mobility) insight into deficits/self-awareness  -LO     Impairments Affecting Function (Mobility) endurance/activity tolerance;shortness of breath;pain;strength;balance  -LO     Comment, Safety Issues/Impairments (Mobility) Requires frequent encouragement to participate/cooperate with assessment this date  -LO               User Key  (r) = Recorded By, (t) = Taken By, (c) = Cosigned By      Initials Name Provider Type    Elina Dias, PT Physical Therapist                   Mobility       Row Name 01/10/25 1113          Bed Mobility    Bed Mobility supine-sit;sit-supine  -LO     Supine-Sit [place] (Bed Mobility) standby assist  -LO     Sit-Supine [place] (Bed Mobility) standby assist  -LO     Comment, (Bed Mobility) very slow movement, no physical assistance required  -       Row Name 01/10/25 1113          Transfers    Comment, (Transfers) EOB> stand> EOB; SBA for safety  -       Row Name 01/10/25 1113          Bed-Chair Transfer    Bed-Chair [place] (Transfers) not tested  -       Row Name 01/10/25 1113          Sit-Stand Transfer    Sit-Stand [place] (Transfers) standby assist  -LO       Row Name 01/10/25 1113          Gait/Stairs (Locomotion)    [place] Level (Gait) contact guard  -LO     Assistive Device (Gait) other (see comments)  none  -LO     Distance in Feet (Gait) 6  -LO     Deviations/Abnormal Patterns (Gait) bilateral deviations;betty decreased;gait speed decreased;stride length decreased  -LO     Bilateral Gait Deviations heel strike decreased  -LO     Comment, (Gait/Stairs) Impusively ambulates before IV pole moved, placing tension on IV line ( safety awareness concerns). Distance self limited reporting further gait with challenge her breathing too much.  -LO               User Key  (r) = Recorded By, (t) = Taken By, (c) = Cosigned By       Initials Name Provider Type    Elina Dias, PT Physical Therapist                   Obj/Interventions       Row Name 01/10/25 1115          Range of Motion Comprehensive    General Range of Motion bilateral lower extremity ROM WNL  -       Row Name 01/10/25 1115          Strength Comprehensive (MMT)    General Manual Muscle Testing (MMT) Assessment lower extremity strength deficits identified  -     Comment, General Manual Muscle Testing (MMT) Assessment BLE grossly 4/5  -       Row Name 01/10/25 1115          Motor Skills    Motor Skills functional endurance  -     Functional Endurance poor per patient  -       Row Name 01/10/25 1115          Balance    Balance Assessment sitting static balance;sitting dynamic balance;standing static balance;standing dynamic balance  -LO     Static Sitting Balance independent  -LO     Dynamic Sitting Balance standby assist  -LO     Position, Sitting Balance unsupported;sitting edge of bed  -LO     Static Standing Balance standby assist  -LO     Dynamic Standing Balance contact guard  -LO     Position/Device Used, Standing Balance unsupported  -LO     Comment, Balance Stood with CGA unsupported, recommend FWW for gait next session  -       Row Name 01/10/25 1115          Sensory Assessment (Somatosensory)    Sensory Assessment (Somatosensory) LE sensation intact  -               User Key  (r) = Recorded By, (t) = Taken By, (c) = Cosigned By      Initials Name Provider Type    Elina Dias, PT Physical Therapist                   Goals/Plan       Row Name 01/10/25 1121          Bed Mobility Goal 1 (PT)    Activity/Assistive Device (Bed Mobility Goal 1, PT) supine to sit;sit to supine  -LO     [place] Level/Cues Needed (Bed Mobility Goal 1, PT) independent  -LO     Time Frame (Bed Mobility Goal 1, PT) short term goal (STG);5 days  -       Row Name 01/10/25 1121          Transfer Goal 1 (PT)    Activity/Assistive Device (Transfer Goal 1, PT)  sit-to-stand/stand-to-sit;bed-to-chair/chair-to-bed;car transfer  -LO     [place] Level/Cues Needed (Transfer Goal 1, PT) modified independence  -LO     Time Frame (Transfer Goal 1, PT) long term goal (LTG);10 days  -       Row Name 01/10/25 1121          Gait Training Goal 1 (PT)    Activity/Assistive Device (Gait Training Goal 1, PT) gait (walking locomotion);decrease fall risk;improve balance and speed;increase endurance/gait distance  -LO     [place] Level (Gait Training Goal 1, PT) independent  -LO     Distance (Gait Training Goal 1, PT) 50  -LO     Time Frame (Gait Training Goal 1, PT) long term goal (LTG);10 days  -       Row Name 01/10/25 1121          Balance Goal 1 (PT)    Activity/Assistive Device (Balance Goal) standing static balance;standing dynamic balance  -LO     [place] Level/Cues Needed (Balance Goal 1, PT) supervision required  -LO     Time Frame (Balance Goal 1, PT) long-term goal (LTG);2 weeks  -LO       Row Name 01/10/25 1121          Therapy Assessment/Plan (PT)    Planned Therapy Interventions (PT) balance training;bed mobility training;gait training;stair training;strengthening;patient/family education;neuromuscular re-education;transfer training  -LO               User Key  (r) = Recorded By, (t) = Taken By, (c) = Cosigned By      Initials Name Provider Type    Elina Dias, PT Physical Therapist                   Clinical Impression       Row Name 01/10/25 1117          Pain    Additional Documentation Pain Scale: FACES Pre/Post-Treatment (Group)  -LO       Row Name 01/10/25 1117          Pain Scale: FACES Pre/Post-Treatment    Pain: FACES Scale, Pretreatment 0-->no hurt  -LO     Posttreatment Pain Rating 0-->no hurt  -LO       [facility] Name 01/10/25 1117          Plan of Care Review    Plan of Care Reviewed With patient  -LO     Outcome Evaluation PT eval completed. Patient presenting with deficits in general BLE strength, standing dynamic balance, and functional  endurance effecting functional mobility below baseline. Patient will benefit from skilled IP PT services to address impairments for return to PLOF. Recommendf home with assist and HHPT at [place].  -LO       Row Name 01/10/25 1117          Therapy Assessment/Plan (PT)    Patient/Family Therapy Goals Statement (PT) home  -LO     Rehab Potential (PT) good  -LO     Criteria for Skilled Interventions Met (PT) yes;meets criteria;skilled treatment is necessary  -LO     Therapy Frequency (PT) daily  -LO     Predicted Duration of Therapy Intervention (PT) 2w  -LO       Row Name 01/10/25 1117          Vital Signs    Pre Systolic BP Rehab 119  -LO     Pre Treatment Diastolic BP 47  -LO     Post Systolic BP Rehab 122  -LO     Pre SpO2 (%) 94  -LO     O2 Delivery Pre Treatment nasal cannula  -LO     O2 Delivery Intra Treatment nasal cannula  -LO     Post SpO2 (%) --  unable to assess, patient rips of O2 sensor stating RT not arriving fast enough.  -LO     O2 Delivery Post Treatment nasal cannula  -LO     Pre Patient Position Supine  -LO     Intra Patient Position Standing  -LO     Post Patient Position Supine  -LO       Row Name 01/10/25 1117          Positioning and Restraints    Pre-Treatment Position in bed  -LO     Post Treatment Position bed  -LO     In Bed notified nsg;supine;call light within reach;encouraged to call for assist;exit alarm on;fowlers  -LO               User Key  (r) = Recorded By, (t) = Taken By, (c) = Cosigned By      Initials Name Provider Type    Elina Dias, PT Physical Therapist                   Outcome Measures       Row Name 01/10/25 1122 01/10/25 0144       How much help from another person do you currently need...    Turning from your back to your side while in flat bed without using bedrails? 4  -LO 4  -CH    Moving from lying on back to sitting on the side of a flat bed without bedrails? 4  -LO 4  -CH    Moving to and from a bed to a chair (including a wheelchair)? 3  -LO 4  -CH    Standing up  from a chair using your arms (e.g., wheelchair, bedside chair)? 4  -LO 4  -CH    Climbing 3-5 steps with a railing? 3  -LO 4  -CH    To walk in hospital room? 3  -LO 4  -CH    AM-PAC 6 Clicks Score (PT) 21  -LO 24  -CH    Highest Level of Mobility Goal 6 --> Walk 10 steps or more  -LO 8 --> Walked 250 feet or more  -CH      Row Name 01/10/25 1122 01/10/25 0820       Functional Assessment    Outcome Measure Options AM-PAC 6 Clicks Basic Mobility (PT)  -LO AM-PAC 6 Clicks Daily Activity (OT)  -AC              User Key  (r) = Recorded By, (t) = Taken By, (c) = Cosigned By      Initials Name Provider Type    AC Liyah Sharif, OT Occupational Therapist    Elina Disa, PT Physical Therapist    Nuria Velasco, RN Registered Nurse                                 Physical Therapy Education       Title: PT OT SLP Therapies (In Progress)       Topic: Physical Therapy (Done)       Point: Mobility training (Done)       Learning Progress Summary            Patient Acceptance, E, VU,NR by  at 1/10/2025 0945    Comment: PT POC                      Point: Home exercise program (Done)       Learning Progress Summary            Patient Acceptance, E, VU,NR by  at 1/10/2025 0945    Comment: PT POC                      Point: Body mechanics (Done)       Learning Progress Summary            Patient Acceptance, E, VU,NR by  at 1/10/2025 0945    Comment: PT POC                      Point: Precautions (Done)       Learning Progress Summary            Patient Acceptance, E, VU,NR by  at 1/10/2025 0945    Comment: PT POC                                      User Key       Initials Effective Dates Name Provider Type Discipline     06/16/21 -  Elian Zaidi, PT Physical Therapist PT                  PT Recommendation and Plan  Planned Therapy Interventions (PT): balance training, bed mobility training, gait training, stair training, strengthening, patient/family education, neuromuscular re-education, transfer  training  Outcome Evaluation: PT eval completed. Patient presenting with deficits in general BLE strength, standing dynamic balance, and functional endurance effecting functional mobility below baseline. Patient will benefit from skilled IP PT services to address impairments for return to PLOF. Recommendf home with assist and HHPT at [place].     Time Calculation:   PT Evaluation Complexity  History, PT Evaluation Complexity: 1-2 personal factors and/or comorbidities  Examination of Body Systems (PT Eval Complexity): 1-2 elements  Clinical Presentation (PT Evaluation Complexity): evolving  Clinical Decision Making (PT Evaluation Complexity): low complexity  Overall Complexity (PT Evaluation Complexity): low complexity     PT Charges       Row Name 01/10/25 0945             Time Calculation    Start Time 0945  -LO      PT Received On 01/10/25  -LO      PT Goal Re-Cert Due Date 01/20/25  -LO         Timed Charges    19766 - Gait Training Minutes  4  -LO      41076 - PT Therapeutic Activity Minutes 10  -LO         Untimed Charges    PT Eval/Re-eval Minutes 38  -LO         Total Minutes    Timed Charges Total Minutes 14  -LO      Untimed Charges Total Minutes 38  -LO       Total Minutes 52  -LO                User Key  (r) = Recorded By, (t) = Taken By, (c) = Cosigned By      Initials Name Provider Type    Elina Dias, PT Physical Therapist                  Therapy Charges for Today       Code Description Service Date Service Provider Modifiers Qty    00360567623 HC PT THERAPEUTIC ACT EA 15 MIN 1/10/2025 Elina Zaidi, PT GP 1    83315547626 HC PT EVAL LOW COMPLEXITY 3 1/10/2025 Elina Zaidi, PT GP 1            PT G-Codes  Outcome Measure Options: AM-PAC 6 Clicks Basic Mobility (PT)  AM-PAC 6 Clicks Score (PT): 21  AM-PAC 6 Clicks Score (OT): 20  PT Discharge Summary  Anticipated Discharge Disposition (PT): home with assist, home with home health    Elina Zaidi PT  1/10/2025

## 2025-01-10 NOTE — PLAN OF CARE
Goal Outcome Evaluation:  Plan of Care Reviewed With: patient           Outcome Evaluation: Pt presents below baseline with self care d/t pain and decr activity tolerance.  Pt's O2 sat dropped to 86% with activity and recovered to 93% with rest and PLB.  Pt SBA  LBD, SBA to ambulate 8 ft without AD. OT will follow to advance pt toward PLOF.  Recommend home with HHOT pending progress.    Anticipated Discharge Disposition (OT): home with home health

## 2025-01-10 NOTE — PROGRESS NOTES
[facility] Medicine Services  PROGRESS NOTE    Patient Name: Kristen Jean  : 1960  MRN: [id number]    Date of Admission: 2025  Primary Care Physician: Moiz Nova MD    Subjective   Subjective     CC:  SOA    HPI:  Pt states that we haven't given her any of her home medications. Denies any F/C overnight, unsure if breathing is better. States that tessalon perles have really helped her cough.       Objective   Objective     Vital Signs:   Temp:  [98.2 °F (36.8 °C)-98.4 °F (36.9 °C)] 98.4 °F (36.9 °C)  Heart Rate:  [] 97  Resp:  [16-18] 18  BP: (101-122)/(40-69) 112/47  Flow (L/min) (Oxygen Therapy):  [2-6] 2     Physical Exam:  Constitutional: No acute distress, awake, alert, cachetic appearing WF in NAD  HENT: NCAT, mucous membranes moist  Respiratory: diffuse rhonchi bilaterally   Cardiovascular: RRR, no murmurs, rubs, or gallops  Gastrointestinal: Positive bowel sounds, soft, nontender, nondistended  Musculoskeletal: No bilateral ankle edema  Psychiatric: Appropriate affect, cooperative  Neurologic: Oriented x 3, strength symmetric in all extremities, Cranial Nerves grossly intact to confrontation, speech clear  Skin: No rashes      Results Reviewed:  LAB RESULTS:      Lab 25  1622   WBC  --  16.67*   HEMOGLOBIN  --  10.1*   HEMATOCRIT  --  30.4*   PLATELETS  --  493*   NEUTROS ABS  --  12.88*   IMMATURE GRANS (ABS)  --  0.09*   LYMPHS ABS  --  1.74   MONOS ABS  --  1.50*   EOS ABS  --  0.42*   MCV  --  79.8   SED RATE  --  113*   CRP 13.13*  --    PROCALCITONIN 0.09  --    HSTROP T 12 11         Lab 25  1622   SODIUM  --  127*   POTASSIUM  --  3.4*   CHLORIDE  --  90*   CO2  --  27.0   ANION GAP  --  10.0   BUN  --  5*   CREATININE  --  0.31*   EGFR  --  118.4   GLUCOSE  --  131*   CALCIUM  --  9.1   HEMOGLOBIN A1C  --  6.00*   TSH 0.160*  --          Lab 25  1622   TOTAL PROTEIN 6.1   ALBUMIN 3.2*   GLOBULIN 2.9    ALT (SGPT) 21   AST (SGOT) 29   BILIRUBIN 0.3   ALK PHOS 201*   LIPASE 18         Lab 01/09/25  1807 01/09/25  1622   PROBNP  --  264.7   HSTROP T 12 11         Lab 01/09/25  1807   CHOLESTEROL 144   LDL CHOL 88   HDL CHOL 41   TRIGLYCERIDES 74             Brief Urine Lab Results       None            Microbiology Results Abnormal       None            CT Chest Without Contrast Diagnostic    Result Date: 1/9/2025  CT CHEST WO CONTRAST DIAGNOSTIC Date of Exam: 1/9/2025 5:35 PM EST Indication: cough, wheezing, right sided pain with HX of MAC. Comparison: 9/20/2022 Technique: Axial CT images were obtained of the chest without contrast administration.  Reconstructed coronal and sagittal images were also obtained. Automated exposure control and iterative construction methods were used. Findings: MEDIASTINUM: Unremarkable. Aortic and heart size are normal. No mass nor pericardial effusion. CORONARY ARTERIES: No calcified atherosclerotic disease. LUNGS: There is inferior right upper lobe and right middle lobe airspace disease consistent multifocal pneumonia. This is superimposed on a background pattern of emphysema and partially calcified opacities in both lungs which are similar. Previous 12 mm right lower lobe nodule now measures 8 mm. No new/suspicious nodule is identified. PLEURAL SPACE: No effusion, mass, nor pneumothorax. LYMPH NODES: There are no pathologically enlarged lymph nodes. UPPER ABDOMEN: Unremarkable OSSEOUS STRUCTURES: Appropriate for age with no acute process identified.     Impression: Impression: 1.Right lung multifocal pneumonia 2.Decrease in size of right lower lobe pulmonary nodule. No new suspicious nodule identified. 3.Other chronic findings as discussed. Electronically Signed: Gustavo Proctor MD  1/9/2025 6:07 PM EST  Workstation ID: [id number]    XR Chest 1 View    Result Date: 1/9/2025  XR CHEST 1 VW Date of Exam: 1/9/2025 4:50 PM EST Indication: Chest Pain Triage Protocol Comparison: CT chest  dated 9/20/2022 Findings: The cardiomediastinal silhouette is within normal limits. Pulmonary vascularity appears normal. The lungs are hyperinflated with with underlying COPD/emphysema. There are interstitial opacities within the right lung likely representing infectious or inflammatory process. There is biapical scarring. There is a bleb within the superior lateral aspect of the right upper lobe with surrounding thickened wall. This is new from prior exam. There is no pleural effusion.     Impression: Impression: 1.Hyperinflated lungs with underlying COPD/emphysema. 2.Interstitial opacities within the right lung likely representing infectious or inflammatory process. 3.New bleb within the superior lateral aspect of the right upper lobe with surrounding thickening of the wall. Electronically Signed: Pito Mcdanielelor  1/9/2025 5:14 PM EST  Workstation ID: [id number]         Current medications:  Scheduled Meds:Pharmacy Consult, , Not Applicable, BID  aspirin, 324 mg, Oral, Once  budesonide-formoterol, 2 puff, Inhalation, BID - RT  enoxaparin, 30 mg, Subcutaneous, Daily  ipratropium-albuterol, 3 mL, Nebulization, 4x Daily - RT  lactated ringers, 500 mL, Intravenous, Once  Linezolid, 600 mg, Intravenous, Q12H  melatonin, 5 mg, Oral, Nightly  meropenem, 1,000 mg, Intravenous, Q8H  pantoprazole, 40 mg, Oral, Nightly  potassium chloride ER, 40 mEq, Oral, Q4H  sodium chloride, 10 mL, Intravenous, Q12H      Continuous Infusions:sodium chloride, 100 mL/hr, Last Rate: 100 mL/hr (01/10/25 0124)      PRN Meds:.  acetaminophen **OR** acetaminophen **OR** acetaminophen    benzonatate    senna-docusate sodium **AND** polyethylene glycol **AND** bisacodyl **AND** bisacodyl    Calcium Replacement - Follow Nurse / BPA Driven Protocol    LORazepam    Magnesium Standard Dose Replacement - Follow Nurse / BPA Driven Protocol    nitroglycerin    Phosphorus Replacement - Follow Nurse / BPA Driven Protocol    Potassium Replacement - Follow  Nurse / BPA Driven Protocol    sodium chloride    sodium chloride    sodium chloride    Assessment & Plan   Assessment & Plan     Active Hospital Problems    Diagnosis  POA    **Pneumonia [J18.9]  Yes    Emphysema of lung [J43.9]  Unknown    Asthma-COPD overlap syndrome [J44.89]  Unknown    Anxiety disorder [F41.9]  Unknown    Multifocal pneumonia [J18.9]  Unknown    Hyponatremia [E87.1]  Unknown    Hypokalemia [E87.6]  Unknown    Protein calorie malnutrition [E46]  Unknown    Cachexia [R64]  Unknown    End stage COPD [J44.9]  Unknown    Hypothyroidism (acquired) [E03.9]  Unknown    Mycobacterium avium complex [A31.0]  Unknown    T2DM (type 2 diabetes mellitus) [E11.9]  Unknown    Chronic bronchitis [J42]  Yes    Mycobacterium infection [A31.9]  Yes      Resolved Hospital Problems   No resolved problems to display.        Brief Hospital Course to date:  Kristen Jean is a 64 y.o. female  with a PMH significant for pulmonary Mycobacterium avium complex diagnosed 2015, end-stage COPD, COPD/asthma overlap syndrome, advanced emphysema, anxiety and protein calorie malnutrition with COPD cachexia who presented to [facility] ED secondary to worsening shortness of breath, higher oxygen requirements, right-sided pleuritic chest pain, pain in lower chest on the right side and was found to have multifocal PNA.    This patient's problems and plans were partially entered by my partner and updated as appropriate by me 01/10/25.    Plan:     Pneumonia  Multifocal pneumonia  Mycobacterium avium complex  End-stage COPD  Asthma - COPD overlap syndrome  Emphysema of the lung  Chronic bronchitis  Patient has a history that is significant for advanced COPD with a history of asthma - COPD overlap syndrome, follows [facility] pulmonology  - Does have a history of Mycobacterium AVM complex, appears on CT that her previous RLL nodule has decreased in size  - CT chest without contrast revealed multifocal pneumonia more  pronounced RML/RLL  - Management of pneumonia does become somewhat complicated due to underlying MAC, patient is currently on rifampin as outpatient with recent fill history and azithromycin  - MRSA swab, streptococcal Legionella urine antigens, respiratory culture PENDING   -- respiratory viral PCR panel negative  - Continue  Merrem and Zyvox for now  - Consulted ID,  will require guidance on antimicrobial regimen and duration  - Consulted pulmonology due to extent of pulmonary disease  - DuoNebs, Tessalon Perles and albuterol inhaler  - Continue nasal cannula oxygen and wean as tolerated  - Needs goals of care discussion regarding prognosis.     Hyponatremia  Hypokalemia  Suspect SIADH in the setting of pneumonia versus hypovolemic hyponatremia  - Will check urine sodium, serum osmolarity, urine osmolarity, and a.m. cortisol  - Will administer normal saline drip for short period as she does endorse that she has not been eating or drinking well.    - Potassium replacement protocol in place     Protein calorie malnutrition  Cachexia of end-stage lung disease  Patient has low albumin, appears cachectic likely secondary to advanced lung disease  - Nutrition consult for assessment of malnutrition     Goals of care  -- pt still full code for time being.  Will continue to discuss with her given her poor overall prognosis      Low TSH  -- Likely subclinical thyroiditis with low TSH and normal free T4, in the setting of pneumonia  -- Will need repeat of thyroid function in 6 to 8 weeks.     T2DM/prediabetes  Check HbA1c and lipid panel    Anxiety  -- will reorder her benzo per home meds/fill record     Total time spent: Time Spent: Time Spent: 35 minutes  Time spent includes time reviewing chart, face-to-face time, counseling patient/family/caregiver, ordering medications/tests/procedures, communicating with other health care professionals, documenting clinical information in the electronic health record, and coordination  of care.      Expected Discharge Location and Transportation: TBD  Expected Discharge   Expected Discharge Date: 1/13/2025; Expected Discharge Time:      VTE Prophylaxis:  Pharmacologic & mechanical VTE prophylaxis orders are present.         AM-PAC 6 Clicks Score (PT): 24 (01/10/25 0144)    CODE STATUS:   Code Status and Medical Interventions: CPR (Attempt to Resuscitate); Full Support   Ordered at: 01/09/25 2009     Level Of Support Discussed With:    Patient     Code Status (Patient has no pulse and is not breathing):    CPR (Attempt to Resuscitate)     Medical Interventions (Patient has pulse or is breathing):    Full Support       Franchesca Stewart MD  01/10/25

## 2025-01-10 NOTE — H&P
[facility] Medicine Services  HISTORY AND PHYSICAL    Patient Name: Kristen Jean  : 1960  MRN: [id number]  Primary Care Physician: Moiz Nova MD  Date of admission: 2025      Subjective   Subjective     Chief Complaint:  Pneumonia    HPI:  Kristen Jean is a 64 y.o. female with a PMH significant for pulmonary Mycobacterium avium complex diagnosed 2015, end-stage COPD, COPD/asthma overlap syndrome, advanced emphysema, anxiety and protein calorie malnutrition with COPD cachexia presenting to [facility] ED secondary to worsening shortness of breath, higher oxygen requirements, right-sided pleuritic chest pain, pain in lower chest on the right side.  She states her symptoms have been ongoing for over a week, 2 days ago she bent over and felt as if she tore something in her right lower chest, felt a tearing sensation.  She does endorse pleuritic chest pain with increased cough however denies increased phlegm production.  She states she lives in an apartment building for the past 4 years and is exposed to significant amounts of secondhand smoke, all 4 of her neighbors all smoke indoors and oftentimes smoke enters her apartment as well.  She has an air purifier (2 of them) without significant relief.  She states her last cigarette was in 2024.  She typically wears 3 L nasal cannula at baseline however has been requiring up to 5 to 6 L and satting at 93%.  She follows pulmonology at [facility] and ID at [facility]  At this time she denies any urinary symptoms, GI symptoms, nausea, vomiting, hematemesis, hemoptysis, diarrhea, chest pain or palpitations.    In the ED, she was hemodynamically stable however tachycardic.  Troponins 11 (equivocal), proBNP 264, sodium 127, potassium 3.4, chloride 90, creatinine 0.31, glucose 131, alk phos 201, albumin 3.2.  Leukocytosis with WBC count 16.67 with hemoglobin 10.1.   Underwent CXR which revealed hyperinflated lungs with underlying COPD/emphysema, interstitial opacities within the right lung likely representing infectious/inflammatory process, new blood within the superior lateral aspect of the right upper lobe with surrounding thickening of the wall.  CT chest without contrast revealed right lung multifocal pneumonia, decrease in size of RLL pulmonary nodule, no suspicious new nodule identified.  QTc 408.  Received cefepime and Zyvox in the ED.  Hospitalist team was contacted, agreed to admit.        Personal History     Past Medical History:   Diagnosis Date    Breast injury            Past Surgical History:   Procedure Laterality Date    TOOTH EXTRACTION      1990 and 2002       Family History: family history includes Dementia in her father.     Social History:  reports that she has been smoking. She does not have any smokeless tobacco history on file. She reports that she does not drink alcohol and does not use drugs.  Social History     Social History Narrative    Not on file       Medications:  Available home medication information reviewed.  Fluticasone Furoate-Vilanterol, LORazepam, ipratropium-albuterol, and pantoprazole    Allergies   Allergen Reactions    Codeine     Fluticasone-Salmeterol     Penicillins        Objective   Objective     Vital Signs:   Temp:  [98.2 °F (36.8 °C)] 98.2 °F (36.8 °C)  Heart Rate:  [] 109  Resp:  [16-18] 18  BP: (101-122)/(40-69) 104/68  Flow (L/min) (Oxygen Therapy):  [2-6] 2       Physical Exam   Constitutional: Awake, alert patient appears very cachectic likely related to end-stage COPD  Eyes: PERRLA, sclerae anicteric, no conjunctival injection  HENT: NCAT, mucous membranes moist  Neck: Supple, no thyromegaly, no lymphadenopathy, trachea midline  Respiratory: Decreased air entry bilaterally on auscultation, rhonchi heard right lung base, significant wheezing  Cardiovascular: RRR, no murmurs, rubs, or gallops, palpable pedal pulses  bilaterally  Gastrointestinal: Positive bowel sounds, soft, nontender, nondistended  Musculoskeletal: No bilateral ankle edema, no clubbing or cyanosis to extremities  Psychiatric: Appropriate affect, cooperative  Neurologic: Oriented x 3, strength symmetric in all extremities, Cranial Nerves grossly intact to confrontation, speech clear  Skin: No rashes      Result Review:  I have personally reviewed the results from the time of this admission to 1/9/2025 23:17 EST and agree with these findings:  [x]  Laboratory list / accordion  []  Microbiology  [x]  Radiology  [x]  EKG/Telemetry   [x]  Cardiology/Vascular   []  Pathology  [x]  Old records  []  Other:    LAB RESULTS:      Lab 01/09/25  1807 01/09/25  1622   WBC  --  16.67*   HEMOGLOBIN  --  10.1*   HEMATOCRIT  --  30.4*   PLATELETS  --  493*   NEUTROS ABS  --  12.88*   IMMATURE GRANS (ABS)  --  0.09*   LYMPHS ABS  --  1.74   MONOS ABS  --  1.50*   EOS ABS  --  0.42*   MCV  --  79.8   SED RATE  --  113*   CRP 13.13*  --    PROCALCITONIN 0.09  --          Lab 01/09/25  1807 01/09/25  1622   SODIUM  --  127*   POTASSIUM  --  3.4*   CHLORIDE  --  90*   CO2  --  27.0   ANION GAP  --  10.0   BUN  --  5*   CREATININE  --  0.31*   EGFR  --  118.4   GLUCOSE  --  131*   CALCIUM  --  9.1   HEMOGLOBIN A1C  --  6.00*   TSH 0.160*  --          Lab 01/09/25  1622   TOTAL PROTEIN 6.1   ALBUMIN 3.2*   GLOBULIN 2.9   ALT (SGPT) 21   AST (SGOT) 29   BILIRUBIN 0.3   ALK PHOS 201*   LIPASE 18         Lab 01/09/25  1807 01/09/25  1622   PROBNP  --  264.7   HSTROP T 12 11         Lab 01/09/25  1807   CHOLESTEROL 144   LDL CHOL 88   HDL CHOL 41   TRIGLYCERIDES 74                 Microbiology Results (last 10 days)       ** No results found for the last 240 hours. **            CT Chest Without Contrast Diagnostic    Result Date: 1/9/2025  CT CHEST WO CONTRAST DIAGNOSTIC Date of Exam: 1/9/2025 5:35 PM EST Indication: cough, wheezing, right sided pain with HX of MAC. Comparison: 9/20/2022  Technique: Axial CT images were obtained of the chest without contrast administration.  Reconstructed coronal and sagittal images were also obtained. Automated exposure control and iterative construction methods were used. Findings: MEDIASTINUM: Unremarkable. Aortic and heart size are normal. No mass nor pericardial effusion. CORONARY ARTERIES: No calcified atherosclerotic disease. LUNGS: There is inferior right upper lobe and right middle lobe airspace disease consistent multifocal pneumonia. This is superimposed on a background pattern of emphysema and partially calcified opacities in both lungs which are similar. Previous 12 mm right lower lobe nodule now measures 8 mm. No new/suspicious nodule is identified. PLEURAL SPACE: No effusion, mass, nor pneumothorax. LYMPH NODES: There are no pathologically enlarged lymph nodes. UPPER ABDOMEN: Unremarkable OSSEOUS STRUCTURES: Appropriate for age with no acute process identified.     Impression: Impression: 1.Right lung multifocal pneumonia 2.Decrease in size of right lower lobe pulmonary nodule. No new suspicious nodule identified. 3.Other chronic findings as discussed. Electronically Signed: Gustavo Proctor MD  1/9/2025 6:07 PM EST  Workstation ID: [id number]    XR Chest 1 View    Result Date: 1/9/2025  XR CHEST 1 VW Date of Exam: 1/9/2025 4:50 PM EST Indication: Chest Pain Triage Protocol Comparison: CT chest dated 9/20/2022 Findings: The cardiomediastinal silhouette is within normal limits. Pulmonary vascularity appears normal. The lungs are hyperinflated with with underlying COPD/emphysema. There are interstitial opacities within the right lung likely representing infectious or inflammatory process. There is biapical scarring. There is a bleb within the superior lateral aspect of the right upper lobe with surrounding thickened wall. This is new from prior exam. There is no pleural effusion.     Impression: Impression: 1.Hyperinflated lungs with underlying  COPD/emphysema. 2.Interstitial opacities within the right lung likely representing infectious or inflammatory process. 3.New bleb within the superior lateral aspect of the right upper lobe with surrounding thickening of the wall. Electronically Signed: Pito Mcdanielelor  1/9/2025 5:14 PM EST  Workstation ID: [id number]         Assessment & Plan   Assessment & Plan       Pneumonia    Chronic bronchitis    Mycobacterium infection    Emphysema of lung    Asthma-COPD overlap syndrome    Anxiety disorder    Multifocal pneumonia    Hyponatremia    Hypokalemia    Protein calorie malnutrition    Cachexia    End stage COPD    Hypothyroidism (acquired)    Mycobacterium avium complex    T2DM (type 2 diabetes mellitus)    Kristen Jean is a 64 y.o. female with a PMH significant for pulmonary Mycobacterium avium complex diagnosed 2015, end-stage COPD, COPD/asthma overlap syndrome, advanced emphysema, anxiety and protein calorie malnutrition with COPD cachexia presenting to [facility] ED secondary to worsening shortness of breath, higher oxygen requirements, right-sided pleuritic chest pain, pain in lower chest on the right side.  She states her symptoms have been ongoing for over a week, 2 days ago she bent over and felt as if she tore something in her right lower chest, felt a tearing sensation.  She does endorse pleuritic chest pain with increased cough however denies increased phlegm production.  Currently admitted for multifocal pneumonia.    Pneumonia  Multifocal pneumonia  Mycobacterium avium complex  End-stage COPD  Asthma - COPD overlap syndrome  Emphysema of the lung  Chronic bronchitis  Patient has a history that is significant for advanced COPD with a history of asthma - COPD overlap syndrome, follows [facility] pulmonology  - Previous PFTs April 2023, appears has not been interpreted officially however FEV1 to FVC ratio significantly low 40% postbronchodilator, , significantly high residual  volume, DLCO 47%.  - Does have a history of Mycobacterium AVM complex, appears on CT that her previous RLL odule has decreased in size  - CT chest without contrast revealed multifocal pneumonia more pronounced RML/RLL  - Management of pneumonia does become somewhat complicated due to underlying MAC, patient is currently on rifampin with recent fill history and azithromycin  - MRSA swab, streptococcal Legionella urine antigens, respiratory culture and respiratory viral PCR panel ordered  - Will initiate Merrem and Zyvox for now  - Consult to ID in a.m., will require guidance on antimicrobial regimen and duration  - Consult to pulmonology in a.m. due to extent of pulmonary disease  - DuoNebs, Tessalon Perles and albuterol inhaler  - Continue nasal cannula oxygen and wean as tolerated  - Needs goals of care discussion regarding prognosis.    Hyponatremia  Hypokalemia  Suspect SIADH in the setting of pneumonia versus hypovolemic hyponatremia  - Will check urine sodium, serum osmolarity, urine osmolarity, TSH and a.m. cortisol  - TSH 0.16, free T4 1.36, likely subclinical thyroiditis in the setting of pneumonia  - Will administer normal saline drip for short period as she does endorse that she has not been eating or drinking well.  She looks dry with delayed cap refill  - Potassium replacement protocol in place    Protein calorie malnutrition  Cachexia of end-stage lung disease  Patient has low albumin, appears cachectic likely secondary to advanced lung disease  - Nutrition consult for assessment of malnutrition    Goals of care  I attempted to touch on the topic of CODE STATUS, being DNR/DNI, patient stated that she wants to think about it I explained to her regarding her prognosis and how advanced her lung disease is and she understands this.  I mentioned that if she was to undergo a cardiac arrest that chest compressions would likely break her bones (ribs) and if she was to be intubated given the significant amount  of blebs that she has it would increase her risk of barotrauma related pneumothorax.  She stated that she will think about it and let us know.    Hypothyroidism  Likely subclinical thyroiditis with low TSH and normal free T4, in the setting of pneumonia  Will need repeat of thyroid function in 6 to 8 weeks.    T2DM/prediabetes  Check HbA1c and lipid panel    VTE Prophylaxis:  Pharmacologic & mechanical VTE prophylaxis orders are present.    Total time spent: 85 minutes  Time spent includes time reviewing chart, face-to-face time, counseling patient/family/caregiver, ordering medications/tests/procedures, communicating with other health care professionals, documenting clinical information in the electronic health record, and coordination of care.      CODE STATUS:    Code Status and Medical Interventions: CPR (Attempt to Resuscitate); Full Support   Ordered at: 01/09/25 2009     Level Of Support Discussed With:    Patient     Code Status (Patient has no pulse and is not breathing):    CPR (Attempt to Resuscitate)     Medical Interventions (Patient has pulse or is breathing):    Full Support       Expected Discharge   Expected discharge date/ time has not been documented.     Rosi Medina MD  01/09/25

## 2025-01-10 NOTE — PLAN OF CARE
Problem: Adult Inpatient Plan of Care  Goal: Plan of Care Review  Outcome: Progressing  Flowsheets (Taken 1/10/2025 1355)  Progress: improving  Plan of Care Reviewed With: patient  Goal: Patient-Specific Goal (Individualized)  Outcome: Progressing  Goal: Absence of Hospital-Acquired Illness or Injury  Outcome: Progressing  Intervention: Identify and Manage Fall Risk  Recent Flowsheet Documentation  Taken 1/10/2025 1200 by Roberto Apodaca RN  Safety Promotion/Fall Prevention:   activity supervised   nonskid shoes/slippers when out of bed   room organization consistent   safety round/check completed   clutter free environment maintained   fall prevention program maintained  Taken 1/10/2025 0807 by Roberto Apodaca RN  Safety Promotion/Fall Prevention:   activity supervised   nonskid shoes/slippers when out of bed   room organization consistent   safety round/check completed   clutter free environment maintained   fall prevention program maintained  Intervention: Prevent Skin Injury  Recent Flowsheet Documentation  Taken 1/10/2025 1200 by Roberto Apodaca RN  Body Position:   position changed independently   weight shifting  Skin Protection: (removed silicone dressings) other (see comments)  Taken 1/10/2025 0807 by Roberto Apodaca RN  Body Position:   position changed independently   weight shifting  Skin Protection: silicone foam dressing in place  Intervention: Prevent and Manage VTE (Venous Thromboembolism) Risk  Recent Flowsheet Documentation  Taken 1/10/2025 0807 by Roberto Apodaca RN  VTE Prevention/Management: (Patient is on Lovenox and is refusing) other (see comments)  Intervention: Prevent Infection  Recent Flowsheet Documentation  Taken 1/10/2025 1200 by Roberto Apodaca RN  Infection Prevention:   environmental surveillance performed   equipment surfaces disinfected   hand hygiene promoted   personal protective equipment utilized   rest/sleep promoted   single patient room provided  Taken 1/10/2025 0807 by  Roberto Apodaca RN  Infection Prevention:   environmental surveillance performed   equipment surfaces disinfected   hand hygiene promoted   personal protective equipment utilized   rest/sleep promoted   single patient room provided  Goal: Optimal Comfort and Wellbeing  Outcome: Progressing  Intervention: Provide Person-Centered Care  Recent Flowsheet Documentation  Taken 1/10/2025 1200 by Roberto Apodaca RN  Trust Relationship/Rapport:   care explained   emotional support provided   empathic listening provided   questions answered   questions encouraged   reassurance provided   thoughts/feelings acknowledged  Taken 1/10/2025 0807 by Roberto Apodaca RN  Trust Relationship/Rapport:   care explained   emotional support provided   empathic listening provided   questions answered   questions encouraged   reassurance provided   thoughts/feelings acknowledged  Goal: Readiness for Transition of Care  Outcome: Progressing     Problem: Fall Injury Risk  Goal: Absence of Fall and Fall-Related Injury  Outcome: Progressing  Intervention: Identify and Manage Contributors  Recent Flowsheet Documentation  Taken 1/10/2025 1200 by Roberto Apodaca RN  Medication Review/Management:   medications reviewed   high-risk medications identified  Self-Care Promotion:   BADL personal objects within reach   BADL personal routines maintained   adaptive equipment use encouraged  Taken 1/10/2025 0807 by Roberto Apodaca RN  Medication Review/Management:   medications reviewed   high-risk medications identified  Self-Care Promotion:   BADL personal objects within reach   BADL personal routines maintained   adaptive equipment use encouraged  Intervention: Promote Injury-Free Environment  Recent Flowsheet Documentation  Taken 1/10/2025 1200 by Roberto Apodaca RN  Safety Promotion/Fall Prevention:   activity supervised   nonskid shoes/slippers when out of bed   room organization consistent   safety round/check completed   clutter free environment  maintained   fall prevention program maintained  Taken 1/10/2025 0807 by Roberto Apodaca, RN  Safety Promotion/Fall Prevention:   activity supervised   nonskid shoes/slippers when out of bed   room organization consistent   safety round/check completed   clutter free environment maintained   fall prevention program maintained     Problem: Pain Acute  Goal: Optimal Pain Control and Function  Outcome: Progressing  Intervention: Prevent or Manage Pain  Recent Flowsheet Documentation  Taken 1/10/2025 1200 by Roberto Apodaca, RN  Medication Review/Management:   medications reviewed   high-risk medications identified  Taken 1/10/2025 0807 by Roberto Apodaca, RN  Medication Review/Management:   medications reviewed   high-risk medications identified     Problem: Infection  Goal: Absence of Infection Signs and Symptoms  Outcome: Progressing   Goal Outcome Evaluation:  Plan of Care Reviewed With: patient        Progress: improving

## 2025-01-10 NOTE — NURSING NOTE
Patient's home medications sent to pharmacy: magnesium, ativan, levaquin, protonix, and powder inhaler.

## 2025-01-10 NOTE — PLAN OF CARE
Problem: Adult Inpatient Plan of Care  Goal: Plan of Care Review  Outcome: Progressing  Goal: Patient-Specific Goal (Individualized)  Outcome: Progressing  Goal: Absence of Hospital-Acquired Illness or Injury  Outcome: Progressing  Goal: Optimal Comfort and Wellbeing  Outcome: Progressing  Goal: Readiness for Transition of Care  Outcome: Progressing     Problem: Fall Injury Risk  Goal: Absence of Fall and Fall-Related Injury  Outcome: Progressing   Goal Outcome Evaluation:

## 2025-01-10 NOTE — CASE MANAGEMENT/SOCIAL WORK
Discharge Planning Assessment  [facility]     Patient Name: Kristen Jean  MRN: [id number]  Today's Date: 1/10/2025    Admit Date: 1/9/2025    Plan: home with home health?   Discharge Needs Assessment       Row Name 01/10/25 0955       Living Environment    People in Home alone    Current Living Arrangements apartment    Potentially Unsafe Housing Conditions none    In the past 12 months has the electric, gas, oil, or water company threatened to shut off services in your home? No    Primary Care Provided by self    Provides Primary Care For no one    Family Caregiver if Needed none    Quality of Family Relationships unable to assess    Able to Return to Prior Arrangements yes       Resource/Environmental Concerns    Resource/Environmental Concerns none    Environment Concerns none    Transportation Concerns none       Transportation Needs    In the past 12 months, has lack of transportation kept you from medical appointments or from getting medications? no    In the past 12 months, has lack of transportation kept you from meetings, work, or from getting things needed for daily living? No       Food Insecurity    Within the past 12 months, you worried that your food would run out before you got the money to buy more. Never true    Within the past 12 months, the food you bought just didn't last and you didn't have money to get more. Never true       Transition Planning    Patient/Family Anticipated Services at Transition none       Discharge Needs Assessment    Readmission Within the Last 30 Days no previous admission in last 30 days    Equipment Currently Used at Home oxygen;nebulizer    Concerns to be Addressed discharge planning    Do you want help finding or keeping work or a job? I do not need or want help    Do you want help with school or training? For example, starting or completing job training or getting a high school diploma, GED or equivalent No    Anticipated Changes Related to Illness none    Equipment  Needed After Discharge none    Current Discharge Risk lives alone                   Discharge Plan       Row Name 01/10/25 0957       Plan    Plan home with home health?    Patient/Family in Agreement with Plan yes    Plan Comments Met with Ms. Jean at the bedside to initiate discharge plan. She lives alone in a senior apartment complex in [facility]. She is independent with activities of daily living. She has home oxygen and a nebulizer. She wears 3L at baseline. Her primary care provider is Moiz Nova MD. She denies the receipt of home health or outpatient services. Therapy recommended home with home health at discharge. Ms. Jean is undecided about discharge plan at this time. She does report that she will need assistance with transportation home.  will continue to follow plan of care and assist with discharge planning needs as indicated.    Final Discharge Disposition Code 06 - home with home health care                  Continued Care and Services - Admitted Since 1/9/2025    No active coordination exists for this encounter.       Expected Discharge Date and Time       Expected Discharge Date Expected Discharge Time    Jan 13, 2025            Demographic Summary       Row Name 01/10/25 0953       General Information    Admission Type inpatient    Arrived From emergency department    Referral Source admission list    Reason for Consult discharge planning    Preferred Language English       Contact Information    Permission Granted to Share Info With family/designee    Contact Information Obtained for     Contact Information Comments DERRELL ABREU Friend [phone]                   Functional Status       Row Name 01/10/25 0955       Functional Status    Usual Activity Tolerance good    Current Activity Tolerance good       Physical Activity    On average, how many days per week do you engage in moderate to strenuous exercise (like a brisk walk)? 0 days    On average, how many minutes  do you engage in exercise at this level? 0 min    Number of minutes of exercise per week 0       Assessment of Health Literacy    How often do you have someone help you read hospital materials? Never    How often do you have problems learning about your medical condition because of difficulty understanding written information? Never    How often do you have a problem understanding what is told to you about your medical condition? Never    How confident are you filling out medical forms by yourself? Extremely    Health Literacy Good       Functional Status, IADL    Medications independent    Meal Preparation independent    Housekeeping independent    Laundry independent    Shopping independent    If for any reason you need help with day-to-day activities such as bathing, preparing meals, shopping, managing finances, etc., do you get the help you need? I don't need any help       Mental Status    General Appearance WDL WDL       Mental Status Summary    Recent Changes in Mental Status/Cognitive Functioning no changes                   Psychosocial    No documentation.                  Abuse/Neglect    No documentation.                  Legal    No documentation.                  Substance Abuse    No documentation.                  Patient Forms    No documentation.                     Blanca Steinberg RN

## 2025-01-10 NOTE — CONSULTS
INFECTIOUS DISEASE CONSULT/INITIAL HOSPITAL VISIT    Kristen Jean  1960  [id number]    Date of Consult: 1/10/2025    Admission Date: 1/9/2025      Requesting Provider: Rosi Medina MD  Evaluating Physician: Az Wasserman MD    Reason for Consultation: Multifocal pneumonia with h/o MAC on chronic therapy    History of present illness:     1/10/2025:Patient is a 64 y.o. female with pulmonary Mycobacterium avium infection 2015/on chronic therapy, ES COPD/3L O2 baseline, and protein calorie malnutrition who presented to [facility] ED on 1/9/25 with worsening shortness of breath with right pleuritic chest pain, and higher oxygen requirements over the last week prior to admission. About 2 days ago, the patient bent over and she felt like she tore something in her right lower chest.  She quit smoking 11/2024, but all her neighbors smoke in the apartment complex.  The past week, she has needed 5-6 L/min O2 and is still satting at 93% on this oxygen setting.  She follow pulmonology and ID at [facility].  She has been noncompliant with her antibiotics for BONI and rotating taking Levaquin, Azithromycin and Rifampin because of complaints of side effects.   She denies fever, chills, nausea, vomiting, diarrhea, or dysuria.  She has remained afebrile with initial tachycardia. Initial labs were , PCT 0.09, WBC 16,700 with 77% neutrophils, Na 127, K 3.4, creatinine 0.31, CRP 13.13, and CPK 52.  A respiratory panel PCR was negative.  Urinary antigens for Strep pneumo and Legionella were negative. Nasal MRSA PCR is negative. A CT scan of chest without contrast showed right lung multifocal pneumonia, decrease in size of RLL pulmonary nodule.  She is currently on Merrem and Zyvox.  ID was asked to evaluate and manage her antibiotic therapy.      She has been noncompliant with follow-up.  Her last ID visit at  was in 5/24.  She has been noncompliant with her azithromycin, rifampin, and Levaquin as she takes these  medications intermittently.    She states that she has not received an RSV vaccine, Prevnar 20 pneumococcal vaccine, influenza vaccine, or COVID-19 vaccine booster        Past Medical History:   Diagnosis Date    Anxiety disorder 01/09/2025    Asthma-COPD overlap syndrome 01/09/2025    Breast injury     Cachexia 01/09/2025    Chronic bronchitis 08/11/2016    Emphysema of lung 01/09/2025    Hypothyroidism (acquired) 01/09/2025    Mycobacterium infection 08/11/2016    T2DM (type 2 diabetes mellitus) 01/09/2025       Past Surgical History:   Procedure Laterality Date    TOOTH EXTRACTION      1990 and 2002       Family History   Problem Relation Age of Onset    Dementia Father     Breast cancer Neg Hx     Ovarian cancer Neg Hx        Social History     Socioeconomic History    Marital status: Single   Tobacco Use    Smoking status: Every Day   Vaping Use    Vaping status: Never Used   Substance and Sexual Activity    Alcohol use: No    Drug use: Never    Sexual activity: Defer       Allergies   Allergen Reactions    Codeine     Fluticasone-Salmeterol     Penicillins          Medication:    Current Facility-Administered Medications:     !!! Please obtain patients home medications that are stored in central pharmacy (including Breo inhaler in [facility]) and return to patient prior to discharge!, , Not Applicable, BID, Nicole Ch, PharmD    acetaminophen (TYLENOL) tablet 650 mg, 650 mg, Oral, Q4H PRN **OR** acetaminophen (TYLENOL) 160 MG/5ML oral solution 650 mg, 650 mg, Oral, Q4H PRN **OR** acetaminophen (TYLENOL) suppository 650 mg, 650 mg, Rectal, Q4H PRN, Rosi Medina MD    albuterol (PROVENTIL) nebulizer solution 0.083% 2.5 mg/3mL, 2.5 mg, Nebulization, Q4H PRN, Luis Lanier MD    aspirin chewable tablet 324 mg, 324 mg, Oral, Once, Rosi Medina MD    benzonatate (TESSALON) capsule 100 mg, 100 mg, Oral, TID PRN, Rosi Medina MD, 100 mg at 01/10/25 1611    sennosides-docusate (PERICOLACE)  8.6-50 MG per tablet 2 tablet, 2 tablet, Oral, BID PRN **AND** polyethylene glycol (MIRALAX) packet 17 g, 17 g, Oral, Daily PRN **AND** bisacodyl (DULCOLAX) EC tablet 5 mg, 5 mg, Oral, Daily PRN **AND** bisacodyl (DULCOLAX) suppository 10 mg, 10 mg, Rectal, Daily PRN, Rosi Medina MD    Calcium Replacement - Follow Nurse / BPA Driven Protocol, , Not Applicable, PRN, Rosi Medina MD    cefTRIAXone (ROCEPHIN) 2,000 mg in sodium chloride 0.9 % 100 mL MBP, 2,000 mg, Intravenous, Q24H, Anthony Hodges PA, Last Rate: 200 mL/hr at 01/10/25 1710, 2,000 mg at 01/10/25 1710    doxycycline (MONODOX) capsule 100 mg, 100 mg, Oral, Q12H, Anthony Hodges PA    Enoxaparin Sodium (LOVENOX) syringe 30 mg, 30 mg, Subcutaneous, Daily, Rosi Medina MD    Fluticasone Furoate-Vilanterol (BREO ELLIPTA) 200-25 MCG/ACT inhaler 1 puff **Patient Supplied Med**, 1 puff, Inhalation, Daily - RT, Dorian Sinha, [facility]    ipratropium-albuterol (DUO-NEB) nebulizer solution 3 mL, 3 mL, Nebulization, 4x Daily - RT, Rosi Medina MD, 3 mL at 01/10/25 1611    lactated ringers bolus 500 mL, 500 mL, Intravenous, Once, Rosi Medina MD    Linezolid (ZYVOX) 600 mg 300 mL, 600 mg, Intravenous, Q12H, Rosi Medina MD, Last Rate: 300 mL/hr at 01/10/25 1611, 600 mg at 01/10/25 1611    LORazepam (ATIVAN) tablet 0.5 mg, 0.5 mg, Oral, BID PRN, Rosi Medina MD, 0.5 mg at 01/10/25 1426    Magnesium Standard Dose Replacement - Follow Nurse / BPA Driven Protocol, , Not Applicable, PRN, Rosi Medina MD    melatonin tablet 5 mg, 5 mg, Oral, Nightly, Rosi Medina MD    nitroglycerin (NITROSTAT) SL tablet 0.4 mg, 0.4 mg, Sublingual, Q5 Min PRN, Rosi Medina MD    pantoprazole (PROTONIX) EC tablet 40 mg, 40 mg, Oral, Nightly, Rosi Medina MD, 40 mg at 01/09/25 220    Pharmacy Consult, , Not Applicable, Continuous PRN, Luis Lanier MD    Phosphorus Replacement - Follow Nurse / BPA Driven Protocol, , Not  Applicable, Carol ENNIS Muhammad, MD    Potassium Replacement - Follow Nurse / BPA Driven Protocol, , Not Applicable, Carol ENNIS Muhammad, MD    predniSONE (DELTASONE) tablet 20 mg, 20 mg, Oral, Daily With Breakfast, Luis Lanier MD, 20 mg at 01/10/25 1611    sodium chloride 0.9 % flush 10 mL, 10 mL, Intravenous, PRN, Rosi Medina MD    sodium chloride 0.9 % flush 10 mL, 10 mL, Intravenous, Q12H, Rosi Medina MD, 10 mL at 01/10/25 0811    sodium chloride 0.9 % flush 10 mL, 10 mL, Intravenous, PRN, Rosi Medina MD    sodium chloride 0.9 % infusion 40 mL, 40 mL, Intravenous, PRN, Rosi Medina MD    Antibiotics:  Anti-Infectives (From admission, onward)      Ordered     Dose/Rate Route Frequency Start Stop    01/10/25 1534  doxycycline (MONODOX) capsule 100 mg        Ordering Provider: Anthony Hodges PA    100 mg Oral Every 12 Hours Scheduled 01/10/25 2100 01/24/25 2059    01/10/25 1534  cefTRIAXone (ROCEPHIN) 2,000 mg in sodium chloride 0.9 % 100 mL MBP        Ordering Provider: Anthony Hodges PA    2,000 mg  200 mL/hr over 30 Minutes Intravenous Every 24 Hours 01/10/25 1800 01/24/25 1759    01/09/25 2014  meropenem (MERREM) 1,000 mg in sodium chloride 0.9 % 100 mL MBP  Status:  Discontinued        Ordering Provider: Rosi Medina MD    1,000 mg  over 3 Hours Intravenous Every 8 Hours 01/10/25 0600 01/10/25 1534    01/09/25 2014  Linezolid (ZYVOX) 600 mg 300 mL        Ordering Provider: Rosi Medina MD    600 mg  300 mL/hr over 60 Minutes Intravenous Every 12 Hours 01/10/25 0500 01/15/25 0459    01/09/25 2014  meropenem (MERREM) 1,000 mg in sodium chloride 0.9 % 100 mL MBP        Ordering Provider: Rosi Medina MD    1,000 mg  over 30 Minutes Intravenous Once 01/10/25 0000 01/10/25 0153    01/09/25 1717  Linezolid (ZYVOX) 600 mg 300 mL        Ordering Provider: Ezequiel Eaton DO    600 mg  300 mL/hr over 60 Minutes Intravenous Once 01/09/25 1733 01/09/25 1841     25 1717  cefepime 2000 mg IVPB in 100 mL NS (MBP)        Ordering Provider: Ezequiel Eaton DO    2,000 mg  over 30 Minutes Intravenous Once 25 1733 25 1817              Review of Systems:  Constitutional--   Subjective fevers and shaking chills.  HEENT-- No new vision, hearing or throat complaints.  No epistaxis or oral sores.  Denies odynophagia or dysphagia. No headache, photophobia or neck stiffness.  CV-- No chest pain, palpitation or syncope  Resp-- + SOB/+ cough/no Hemoptysis  GI- No nausea, vomiting, or diarrhea.   -- No dysuria, hematuria, or flank pain.  Denies hesitancy, urgency or burning with urination.   Heme- No active bruising or bleeding;   MS-- no swelling or pain in the bones or joints of arms/legs.  No new back pain.  Neuro-- No acute focal weakness or numbness in the arms or legs.   Skin--No rashes or lesions      Physical Exam:   Vital Signs  Temp (24hrs), Av.5 °F (36.4 °C), Min:96.6 °F (35.9 °C), Max:98.4 °F (36.9 °C)    Temp  Min: 96.6 °F (35.9 °C)  Max: 98.4 °F (36.9 °C)  BP  Min: 94/55  Max: 122/65  Pulse  Min: 92  Max: 126  Resp  Min: 16  Max: 18  SpO2  Min: 92 %  Max: 99 %    GENERAL: Ill and cachectic appearing.  HEENT: Normocephalic, atraumatic.  PERRL. EOMI. No conjunctival injection. No icterus. Oropharynx clear without evidence of thrush or exudate.    NECK: Supple   HEART: RRR; No murmur, rubs, gallops.   LUNGS:   Bilateral right greater than left crackles  ABDOMEN: Soft, nontender, nondistended. No rebound or guarding. NO mass or HSM.  EXT:  No cyanosis, clubbing or edema. No cord.  :  Without Ocasio catheter.  MSK: No joint effusions or erythema  SKIN: Warm and dry without cutaneous eruptions on Inspection/palpation.    NEURO: Oriented to PPT.  Motor 5/5 strength  PSYCHIATRIC: Normal insight and judgment. Cooperative with PE    Laboratory Data    Results from last 7 days   Lab Units 25  1622   WBC 10*3/mm3 16.67*   HEMOGLOBIN g/dL 10.1*    HEMATOCRIT % 30.4*   PLATELETS 10*3/mm3 493*     Results from last 7 days   Lab Units 01/09/25  1622   SODIUM mmol/L 127*   POTASSIUM mmol/L 3.4*   CHLORIDE mmol/L 90*   CO2 mmol/L 27.0   BUN mg/dL 5*   CREATININE mg/dL 0.31*   GLUCOSE mg/dL 131*   CALCIUM mg/dL 9.1     Results from last 7 days   Lab Units 01/09/25  1622   ALK PHOS U/L 201*   BILIRUBIN mg/dL 0.3   ALT (SGPT) U/L 21   AST (SGOT) U/L 29     Results from last 7 days   Lab Units 01/09/25  1622   SED RATE mm/hr 113*     Results from last 7 days   Lab Units 01/09/25  1807   CRP mg/dL 13.13*         Results from last 7 days   Lab Units 01/09/25  1807   CK TOTAL U/L 52         Estimated Creatinine Clearance: 137.8 mL/min (A) (by C-G formula based on SCr of 0.31 mg/dL (L)).      Microbiology:  Microbiology Results (last 10 days)       Procedure Component Value - Date/Time    MRSA Screen, PCR (Inpatient) - Swab, Nares [[id number]]  (Normal) Collected: 01/10/25 1435    Lab Status: Final result Specimen: Swab from Nares Updated: 01/10/25 1607     MRSA PCR Negative    Narrative:      The negative predictive value of this diagnostic test is high and should only be used to consider de-escalating anti-MRSA therapy. A positive result may indicate colonization with MRSA and must be correlated clinically.  MRSA Negative    S. Pneumo Ag Urine or CSF - Urine, Urine, Clean Catch [[id number]]  (Normal) Collected: 01/10/25 0729    Lab Status: Final result Specimen: Urine, Clean Catch Updated: 01/10/25 1353     Strep Pneumo Ag Negative    Legionella Antigen, Urine - Urine, Urine, Clean Catch [[id number]]  (Normal) Collected: 01/10/25 0729    Lab Status: Final result Specimen: Urine, Clean Catch Updated: 01/10/25 1353     LEGIONELLA ANTIGEN, URINE Negative    Respiratory Panel PCR w/COVID-19(SARS-CoV-2) MARIANELA/BRONWYN/LUZ MARINA/PAD/COR/KINGS In-House, NP Swab in UTM/VTM, 2 HR TAT - Swab, Nasopharynx [[id number]]  (Normal) Collected: 01/10/25 0133    Lab Status: Final result Specimen: Swab  from Nasopharynx Updated: 01/10/25 0238     ADENOVIRUS, PCR Not Detected     Coronavirus 229E Not Detected     Coronavirus HKU1 Not Detected     Coronavirus NL63 Not Detected     Coronavirus OC43 Not Detected     COVID19 Not Detected     Human Metapneumovirus Not Detected     Human Rhinovirus/Enterovirus Not Detected     Influenza A PCR Not Detected     Influenza B PCR Not Detected     Parainfluenza Virus 1 Not Detected     Parainfluenza Virus 2 Not Detected     Parainfluenza Virus 3 Not Detected     Parainfluenza Virus 4 Not Detected     RSV, PCR Not Detected     Bordetella pertussis pcr Not Detected     Bordetella parapertussis PCR Not Detected     Chlamydophila pneumoniae PCR Not Detected     Mycoplasma pneumo by PCR Not Detected    Narrative:      In the setting of a positive respiratory panel with a viral infection PLUS a negative procalcitonin without other underlying concern for bacterial infection, consider observing off antibiotics or discontinuation of antibiotics and continue supportive care. If the respiratory panel is positive for atypical bacterial infection (Bordetella pertussis, Chlamydophila pneumoniae, or Mycoplasma pneumoniae), consider antibiotic de-escalation to target atypical bacterial infection.                  Radiology:  Imaging Results (Last 72 Hours)       Procedure Component Value Units Date/Time    CT Chest Without Contrast Diagnostic [[id number]] Collected: 01/09/25 1746     Updated: 01/09/25 1810    Narrative:      CT CHEST WO CONTRAST DIAGNOSTIC    Date of Exam: 1/9/2025 5:35 PM EST    Indication: cough, wheezing, right sided pain with HX of MAC.    Comparison: 9/20/2022    Technique: Axial CT images were obtained of the chest without contrast administration.  Reconstructed coronal and sagittal images were also obtained. Automated exposure control and iterative construction methods were used.      Findings:  MEDIASTINUM: Unremarkable. Aortic and heart size are normal. No mass nor  pericardial effusion.  CORONARY ARTERIES: No calcified atherosclerotic disease.  LUNGS: There is inferior right upper lobe and right middle lobe airspace disease consistent multifocal pneumonia. This is superimposed on a background pattern of emphysema and partially calcified opacities in both lungs which are similar. Previous 12 mm   right lower lobe nodule now measures 8 mm. No new/suspicious nodule is identified.  PLEURAL SPACE: No effusion, mass, nor pneumothorax.  LYMPH NODES: There are no pathologically enlarged lymph nodes.    UPPER ABDOMEN: Unremarkable    OSSEOUS STRUCTURES: Appropriate for age with no acute process identified.          Impression:      Impression:  1.Right lung multifocal pneumonia  2.Decrease in size of right lower lobe pulmonary nodule. No new suspicious nodule identified.  3.Other chronic findings as discussed.        Electronically Signed: Gustavo Proctor MD    1/9/2025 6:07 PM EST    Workstation ID: [id number]    XR Chest 1 View [[id number]] Collected: 01/09/25 1710     Updated: 01/09/25 1717    Narrative:      XR CHEST 1 VW    Date of Exam: 1/9/2025 4:50 PM EST    Indication: Chest Pain Triage Protocol    Comparison: CT chest dated 9/20/2022    Findings:  The cardiomediastinal silhouette is within normal limits. Pulmonary vascularity appears normal. The lungs are hyperinflated with with underlying COPD/emphysema. There are interstitial opacities within the right lung likely representing infectious or   inflammatory process. There is biapical scarring. There is a bleb within the superior lateral aspect of the right upper lobe with surrounding thickened wall. This is new from prior exam. There is no pleural effusion.      Impression:      Impression:  1.Hyperinflated lungs with underlying COPD/emphysema.  2.Interstitial opacities within the right lung likely representing infectious or inflammatory process.  3.New bleb within the superior lateral aspect of the right upper lobe with  surrounding thickening of the wall.        Electronically Signed: Pito Hutton    1/9/2025 5:14 PM EST    Workstation ID: [id number]         I read her radiographic images.      Impression:   Multifocal pneumonia, likely pneumococcal despite negative S.pneumoniae urinary antigen.  We will treat with Rocephin, linezolid, and doxycycline pending cultures.  Mycobacterium avium complex pulmonary infection -since 2016, with medical noncompliance of antibiotics.  She has been rotating Levaquin, azithromycin, and rifampin d/t side effects.  She is not compliant with ID follow up. She is at high risk for developing a resistant BONI strain that will make it difficult to treat in the future.   Leukocytosis/neutrophilia  End stage chronic obstructive pulmonary disease/on 3L O2 baseline at home  Protein calorie malnutrition  Penicillin list allergy.  Seems to tolerate cephalosporins.  Medical noncompliance-this complicates all aspects of her care and increases her risk for poor outcome.  I discussed this issue with her today    PLAN/RECOMMENDATIONS:   Thank you for asking us to see Kristen Jean, I recommend the following:  MRSA PCR  Discontinue Merrem  Start Rocephin 2 GM IV daily  Start doxycycline 100 mg PO BID  Continue linezolid pending further evaluation.   Continue O2 support   Prevnar 20 pneumococcal vaccination, influenza vaccination, RSV vaccination, and COVID-19 vaccine booster in the near future      I discussed her complex situation in detail with her today.   I discussed her complex situation with Dr. Lanier today.    This visit included the following complex service elements:  Complex medical decision-making associated with antimicrobial prescribing.  In-depth chart review with high level synthesis for complex diagnoses.  Managed infection treatment protocol associated with transitions of care for this complex patient.  Counseled patients, family members, and/or caregivers regarding antimicrobial stewardship and  antibiotic resistance.    Az Wasserman MD  1/10/2025  18:06 EST

## 2025-01-10 NOTE — CONSULTS
Malnutrition Severity Assessment    Patient Name:  Kristen Jean  YOB: 1960  MRN: [id number]  Admit Date:  1/9/2025    Patient meets criteria for : Severe Malnutrition    Comments:  Pt meets criteria for severe, chronic malnutrition with the indicators of severe muscle wasting and severe subcutaneous fat loss. Of note, pt with end stage COPD with inadequate intake - likely cause for severe muscle wasting.      Malnutrition Severity Assessment  Malnutrition Type: Chronic Disease - Related Malnutrition  Malnutrition Type (Last 8 Hours)       Malnutrition Severity Assessment       Row Name 01/10/25 1500       Malnutrition Severity Assessment    Malnutrition Type Chronic Disease - Related Malnutrition      Row Name 01/10/25 1500       Muscle Loss    Loss of Muscle Mass Findings Severe    [place] Region Moderate - slight depression    Clavicle Bone Region Severe - protruding prominent bone    Acromion Bone Region Severe - squared shoulders, bones, and acromion process protrusion prominent    Scapular Bone Region Severe - prominent bones, depressions easily visible between ribs, scapula, spine, shoulders    Dorsal Hand Region Severe - prominent depression    Patellar Region Severe - prominent bone, square looking, very little muscle definition    Anterior Thigh Region Severe - line/depression along thigh, obviously thin    Posterior Calf Region Severe - thin with very little definition/firmness      Row Name 01/10/25 1500       Fat Loss    Subcutaneous Fat Loss Findings Severe    Orbital Region  Severe - pronounced hollowness/depression, dark circles, loose saggy skin    Upper Arm Region Severe - mostly skin, very little space between folds, fingers touch    Thoracic & Lumbar Region Severe - ribs visible with prominent depressions, iliac crest very prominent      Row Name 01/10/25 1500       Criteria Met (Must meet criteria for severity in at least 2 of these categories: M Wasting, Fat Loss, Fluid, Secondary  Signs, Wt. Status, Intake)    Patient meets criteria for  Severe Malnutrition                    Electronically signed by:  Leola Acharya MS,RD,LD  01/10/25 15:07 EST

## 2025-01-10 NOTE — CONSULTS
"          Clinical Nutrition Assessment     Patient Name: Kristen Jean  YOB: 1960  MRN: [id number]  Date of Encounter: 01/10/25 09:07 EST  Admission date: 1/9/2025  Reason for Visit: MST score 2+, BMI, Malnutrition Severity Assessment, Physician consult, Unintentional weight loss, Reduced oral intake    Assessment   Nutrition Assessment   Admission Diagnosis:  Pneumonia [J18.9]    Problem List:    Pneumonia    Chronic bronchitis    Mycobacterium infection    Emphysema of lung    Asthma-COPD overlap syndrome    Anxiety disorder    Multifocal pneumonia    Hyponatremia    Hypokalemia    Protein calorie malnutrition    Cachexia    End stage COPD    Hypothyroidism (acquired)    Mycobacterium avium complex    T2DM (type 2 diabetes mellitus)      PMH:   She  has a past medical history of Breast injury.    PSH:  She  has a past surgical history that includes Tooth extraction.    Applicable Nutrition History:       Anthropometrics     Height: Height: 167.6 cm (66\")  Last Filed Weight: Weight: 47 kg (103 lb 9.9 oz) (01/09/25 1609)  Method: Weight Method: Stated  BMI: BMI (Calculated): 16.7    UBW:  ? Limited measured weight data   Weight      Weight (kg) Weight (lbs) Weight Method   4/21/2021 46.267 kg  102 lb     1/9/2025 47 kg  103 lb 9.9 oz  Stated      Weight change:  unable to determine 2/2 limited weight data    Nutrition Focused Physical Exam    Date:  1/10       Patient meets criteria for malnutrition diagnosis, see MSA note.     Subjective   Reported/Observed/Food/Nutrition Related History:     Pt up in bed at time of visit, able to provide weight/nutrition hx. Endorsed prolonged anorexia - currently living in hotel, reports staff will bring breakfast daily. Due to SOB and general fatigue, unable to prepare meals. Reports previously receiving freezer meals from insurance company however was unable to get box into house. Denies dysphagia or difficulty chewing with SOB. Suspect primary challenge to PO " intake is ability to prepare foods. NKFA    Pt reported would not be interested in nutrition support if unable to improve intake    Current Nutrition Prescription   PO: Diet: Regular/House; Fluid Consistency: Thin (IDDSI 0)  Oral Nutrition Supplement: N/A  Intake:  75% x 1 meal    Assessment & Plan   Nutrition Diagnosis   Date:  1/10 Updated:  Problem Malnutrition, chronic severe   Etiology End stage COPD   Signs/Symptoms severe muscle wasting and severe subcutaneous fat loss   Status: New    Date:  1/10            Updated:    Problem Inadequate oral intake    Etiology COPD   Signs/Symptoms Inability to prepare meals   Status: New    Goal:   Nutrition to support treatment and Increase intake      Nutrition Intervention      Follow treatment progress, Care plan reviewed, Advise alternate selection, Advised available snacks, Encourage intake, Supplement provided    Encouraged PO intake on current diet as tolerated  Provide Magic Cup 1x daily  ? Need appetite stimulant  ? Benefit from soft/easy to chew foods    Monitoring/Evaluation:   Per protocol, I&O, PO intake, Supplement intake, Pertinent labs, Symptoms, POC/GOC    Leola Acharya, MS,RD,LD  Time Spent: 25min

## 2025-01-10 NOTE — CONSULTS
Pulmonary Consult Note     LOS: 1 day   Patient Care Team:  Moiz Nova MD as PCP - General (Internal Medicine)      Chief Complaint   Patient presents with    Shortness of Breath     Subjective     64 y.o. active smoker up until May 2023 with history of severe emphysema, chronic hypoxemic respiratory failure on 3 L home oxygen, asthma COPD overlap syndrome, lung nodule attributed to Mycobacterium avium status posttreatment with recurrence and reinitiation of treatment followed by Dr. Malagon at [facility] from an infectious disease standpoint, T2DM, hypothyroidism, admitted to [facility] through the emergency department on 1/9/2025 where she presented for dyspnea and was found to have new right upper lobe infiltrate.  She was admitted for treatment and evaluation of pneumonia in the setting of underlying pulmonary Mycobacterium avium infection.    Patient reports she has been sicker for about the last week.  She takes Breo along with DuoNebs at home.  She reports she cannot take Spiriva.    Pulmonary is consulted due to the severity of her lung disease.  Infectious disease was also consulted for management of antibiotics.    History taken from: Patient    PMH/FH/Social History were reviewed and updated appropriately in the electronic medical record.     Past Medical History:   Diagnosis Date    Anxiety disorder 01/09/2025    Asthma-COPD overlap syndrome 01/09/2025    Breast injury     Cachexia 01/09/2025    Chronic bronchitis 08/11/2016    Emphysema of lung 01/09/2025    Hypothyroidism (acquired) 01/09/2025    Mycobacterium infection 08/11/2016    T2DM (type 2 diabetes mellitus) 01/09/2025     Past Surgical History:   Procedure Laterality Date    TOOTH EXTRACTION      1990 and 2002     Family History   Problem Relation Age of Onset    Dementia Father     Breast cancer Neg Hx     Ovarian cancer Neg Hx      Social History     Socioeconomic History    Marital status: Single   Tobacco Use    Smoking  status: Every Day   Vaping Use    Vaping status: Never Used   Substance and Sexual Activity    Alcohol use: No    Drug use: Never    Sexual activity: Defer         Review of Systems:    Review of 14 systems was completed with positives and pertinent negatives noted in the subjective section.  All other systems reviewed and are negative.         Objective     Vital Signs  Temp:  [96.6 °F (35.9 °C)-98.4 °F (36.9 °C)] 96.6 °F (35.9 °C)  Heart Rate:  [] 99  Resp:  [16-18] 18  BP: ()/(40-69) 94/55  01/09 0701 - 01/10 0700  In: 400   Out: 400 [Urine:400]  Body mass index is 16.72 kg/m².     IV drips:  Pharmacy Consult       Physical Exam:     Constitutional:   Alert, in no acute distress   Head:   Normocephalic, atraumatic   Eyes:           Lids and lashes normal, conjunctivae and sclerae normal.  PER   ENMT:  Ears appear intact with no abnormalities noted     Lips normal.     Neck:  Trachea midline, no JVD   Lungs/Resp:    Normal effort, symmetric chest rise, no crepitus, diminished breath sounds bilateral upper lobes, clear lower lobes.               Heart/CV:   Regular rhythm and normal rate, no murmur   Abdomen/GI:    Soft, nontender, nondistended   :    Deferred   Extremities/MSK:  No clubbing or cyanosis.  No edema.     Pulses:  Pulses palpable and equal bilaterally   Skin:  No bleeding, bruising or rash   Heme/Lymph:  No cervical or supraclavicular adenopathy.   Neurologic:    Psychiatric:    Moves all extremities with no obvious focal motor deficit.  Cranial nerves 2 - 12 grossly intact  Non-agitated, anxious affect.    The above physical exam findings were reviewed and reflect my exam findings as of today's exam.   Electronically signed by:  Luis Lanier MD  01/10/25  15:34 EST      Results Review:     I reviewed the patient's new clinical results.   Results from last 7 days   Lab Units 01/09/25  1622   SODIUM mmol/L 127*   POTASSIUM mmol/L 3.4*   CHLORIDE mmol/L 90*   CO2 mmol/L 27.0   BUN  mg/dL 5*   CREATININE mg/dL 0.31*   CALCIUM mg/dL 9.1   BILIRUBIN mg/dL 0.3   ALK PHOS U/L 201*   ALT (SGPT) U/L 21   AST (SGOT) U/L 29   GLUCOSE mg/dL 131*     Results from last 7 days   Lab Units 01/09/25  1622   WBC 10*3/mm3 16.67*   HEMOGLOBIN g/dL 10.1*   HEMATOCRIT % 30.4*   PLATELETS 10*3/mm3 493*               I reviewed the patient's new imaging including images and reports.    I reviewed the patient's CT scan of the chest which shows emphysema with chronic scarring and a new right upper lobe infiltrate.    Medication Review:   Pharmacy Consult, , Not Applicable, BID  aspirin, 324 mg, Oral, Once  budesonide-formoterol, 2 puff, Inhalation, BID - RT  enoxaparin, 30 mg, Subcutaneous, Daily  ipratropium-albuterol, 3 mL, Nebulization, 4x Daily - RT  lactated ringers, 500 mL, Intravenous, Once  Linezolid, 600 mg, Intravenous, Q12H  melatonin, 5 mg, Oral, Nightly  meropenem, 1,000 mg, Intravenous, Q8H  pantoprazole, 40 mg, Oral, Nightly  sodium chloride, 10 mL, Intravenous, Q12H      Pharmacy Consult,         Assessment & Plan         Pneumonia    Chronic bronchitis    Mycobacterium infection    Emphysema of lung    Asthma-COPD overlap syndrome    Anxiety disorder    Multifocal pneumonia    Hyponatremia    Hypokalemia    Protein calorie malnutrition    Cachexia    End stage COPD    Hypothyroidism (acquired)    Mycobacterium avium complex    T2DM (type 2 diabetes mellitus)    64 y.o. active smoker up until May 2023 with history of severe emphysema, chronic hypoxemic respiratory failure on 3 L home oxygen, asthma COPD overlap syndrome, lung nodule attributed to Mycobacterium avium status posttreatment with recurrence and reinitiation of treatment followed by Dr. Maalgon at [facility] from an infectious disease standpoint, T2DM, hypothyroidism, admitted to [facility] through the emergency department on 1/9/2025 where she presented for dyspnea and was found to have new right upper lobe infiltrate.  She was  admitted for treatment and evaluation of pneumonia in the setting of underlying pulmonary Mycobacterium avium infection.    Patient reports she has been sicker for about the last week.  She takes Breo along with DuoNebs at home.  She reports she cannot take Spiriva.    Pulmonary is consulted due to the severity of her lung disease.  Infectious disease was also consulted for management of antibiotics.    Plan:  1.  For pneumonia: Antibiotics per infectious disease.  2.  For COPD with acute exacerbation: Continue Breo which is her home medication and I have consulted pharmacy to continue this.  Discontinue Symbicort once Breo was started.  Continue DuoNebs scheduled 4 times a day with albuterol nebs every 4 hours as needed.  I start some steroids and I would recommend treating for 7 to 14 days.  3.  For chronic hypoxemic respiratory failure: Patient is on 2-1/2 L currently with adequate oxygenation.  She uses 3 L at home.    Discharge when okay with infectious disease.    Call with questions.      Electronically signed by:    Luis Lanier MD  01/10/25  15:34 EST      *. Please note that portions of this note were completed with [employer] - a voice recognition program.

## 2025-01-10 NOTE — ED NOTES
Kristen Jean    Nursing Report ED to Floor:  Mental status: A/Ox4  Ambulatory status: Assist x2  Oxygen Therapy:  3L NC (Baseline)   Cardiac Rhythm: NSR to sinus tach   Admitted from: Home  Safety Concerns:  Fall risk   Social Issues: Discuss with RN   ED Room #:  28    ED Nurse Phone Extension - [phone] or may call [phone].      HPI:   Chief Complaint   Patient presents with    Shortness of Breath       Past Medical History:  Past Medical History:   Diagnosis Date    Breast injury         Past Surgical History:  Past Surgical History:   Procedure Laterality Date    TOOTH EXTRACTION      1990 and 2002        Admitting Doctor:   Rosi Medina MD    Consulting Provider(s):  Consults       No orders found from 12/11/2024 to 1/10/2025.             Admitting Diagnosis:   The primary encounter diagnosis was Multifocal pneumonia. Diagnoses of History of MAC infection, Bandemia, and Hyponatremia were also pertinent to this visit.    Most Recent Vitals:   Vitals:    01/09/25 1822 01/09/25 1838 01/09/25 1842 01/09/25 1900   BP: 114/50 109/50 109/50 122/65   BP Location:       Patient Position:       Pulse: 93 96 99 94   Resp:       Temp:       TempSrc:       SpO2: 99% 99% 98% 98%   Weight:       Height:           Active LDAs/IV Access:   Lines, Drains & Airways       Active LDAs       Name Placement date Placement time Site Days    Peripheral IV 01/09/25 1612 Left Antecubital 01/09/25  1612  Antecubital  less than 1                    Labs (abnormal labs have a star):   Labs Reviewed   COMPREHENSIVE METABOLIC PANEL - Abnormal; Notable for the following components:       Result Value    Glucose 131 (*)     BUN 5 (*)     Creatinine 0.31 (*)     Sodium 127 (*)     Potassium 3.4 (*)     Chloride 90 (*)     Albumin 3.2 (*)     Alkaline Phosphatase 201 (*)     All other components within normal limits    Narrative:     GFR Categories in Chronic Kidney Disease (CKD)      GFR Category          GFR (mL/min/1.73)    Interpretation  G1                      90 or greater         Normal or high (1)  G2                      60-89                Mild decrease (1)  G3a                   45-59                Mild to moderate decrease  G3b                   30-44                Moderate to severe decrease  G4                    15-29                Severe decrease  G5                    14 or less           Kidney failure          (1)In the absence of evidence of kidney disease, neither GFR category G1 or G2 fulfill the criteria for CKD.    eGFR calculation 2021 CKD-EPI creatinine equation, which does not include race as a factor   CBC WITH AUTO DIFFERENTIAL - Abnormal; Notable for the following components:    WBC 16.67 (*)     Hemoglobin 10.1 (*)     Hematocrit 30.4 (*)     MCH 26.5 (*)     Platelets 493 (*)     Neutrophil % 77.4 (*)     Lymphocyte % 10.4 (*)     Neutrophils, Absolute 12.88 (*)     Monocytes, Absolute 1.50 (*)     Eosinophils, Absolute 0.42 (*)     Immature Grans, Absolute 0.09 (*)     All other components within normal limits   TROPONIN - Normal    Narrative:     High Sensitive Troponin T Reference Range:  <14.0 ng/L- Negative Female for AMI  <22.0 ng/L- Negative Male for AMI  >=14 - Abnormal Female indicating possible myocardial injury.  >=22 - Abnormal Male indicating possible myocardial injury.   Clinicians would have to utilize clinical acumen, EKG, Troponin, and serial changes to determine if it is an Acute Myocardial Infarction or myocardial injury due to an underlying chronic condition.        LIPASE - Normal   BNP (IN-HOUSE) - Normal    Narrative:     This assay is used as an aid in the diagnosis of individuals suspected of having heart failure. It can be used as an aid in the diagnosis of acute decompensated heart failure (ADHF) in patients presenting with signs and symptoms of ADHF to the emergency department (ED). In addition, NT-proBNP of <300 pg/mL indicates ADHF is not likely.    Age Range Result Interpretation  NT-proBNP  Concentration (pg/mL:      <50             Positive            >450                   Gray                 300-450                    Negative             <300    50-75           Positive            >900                  Gray                300-900                  Negative            <300      >75             Positive            >1800                  Gray                300-1800                  Negative            <300   HIGH SENSITIVITIY TROPONIN T 1HR - Normal    Narrative:     High Sensitive Troponin T Reference Range:  <14.0 ng/L- Negative Female for AMI  <22.0 ng/L- Negative Male for AMI  >=14 - Abnormal Female indicating possible myocardial injury.  >=22 - Abnormal Male indicating possible myocardial injury.   Clinicians would have to utilize clinical acumen, EKG, Troponin, and serial changes to determine if it is an Acute Myocardial Infarction or myocardial injury due to an underlying chronic condition.        RAINBOW DRAW    Narrative:     The following orders were created for panel order [place] Draw.  Procedure                               Abnormality         Status                     ---------                               -----------         ------                     Green Top (Gel)[[id number]]                                  Final result               Lavender Top[[id number]]                                     Final result               Red Top[[id number]]                                                                     Gold Top - SST[[id number]]                                   Final result               Green Top (No Gel)[[id number]]                                                          Light Blue Top[[id number]]                                   Final result                 Please view results for these tests on the individual orders.   CBC AND DIFFERENTIAL    Narrative:     The following orders were created for panel order CBC & Differential.  Procedure                                Abnormality         Status                     ---------                               -----------         ------                     CBC Auto Differential[[id number]]        Abnormal            Final result                 Please view results for these tests on the individual orders.   GREEN TOP   LAVENDER TOP   GOLD TOP - SST   LIGHT BLUE TOP       Meds Given in ED:   Medications   sodium chloride 0.9 % flush 10 mL (has no administration in time range)   aspirin chewable tablet 324 mg (324 mg Oral Not Given 1/9/25 1744)   Linezolid (ZYVOX) 600 mg 300 mL (0 mg Intravenous Stopped 1/9/25 1843)   cefepime 2000 mg IVPB in 100 mL NS (MBP) (0 mg Intravenous Stopped 1/9/25 1817)           Last NIH score:                                                          Dysphagia screening results:        [place] Coma Scale:  No data recorded     CIWA:        Restraint Type:            Isolation Status:  No active isolations

## 2025-01-10 NOTE — PAYOR COMM NOTE
"ID: [id number]   : 1960    Pneumonia [J18.9]  Nancie Zaldivar MD (NPI: [id number])     CORINA Serrano, RN  Utilization Review  Phone [phone]  Fax [phone]    [address] [address]         Kristen Terrell (64 y.o. Female)       Date of Birth   1960    Social Security Number       Address   [address] [place] [address]    Home Phone   [phone]    MRN   [id number]       Alevism   None    Marital Status   Single                            Admission Date   25    Admission Type   Emergency    Admitting Provider   Franchesca Stewart MD    Attending Provider   Franchesca Stewart MD    Department, Room/Bed   [facility] 5G, S559/1       Discharge Date       Discharge Disposition       Discharge Destination                                 Attending Provider: Franchesca Stewart MD    Allergies: Codeine, Fluticasone-salmeterol, Penicillins    Isolation: None   Infection: None   Code Status: CPR    Ht: 167.6 cm (66\")   Wt: 47 kg (103 lb 9.9 oz)    Admission Cmt: None   Principal Problem: Pneumonia [J18.9]                   Active Insurance as of 2025       Primary Coverage       Payor Plan Insurance Group Employer/Plan Group    [employer] [place] [employer] [place]ICA[place]        Payor Plan Address Payor Plan Phone Number Payor Plan Fax Number Effective Dates    [address] [address] [phone]  2016 - None Entered    [facility] [id number]         Subscriber Name Subscriber Birth Date Member ID       KRISTEN TERRELL 1960 [id number]                     Emergency Contacts        (Rel.) Home Phone Work Phone Mobile Phone    BRADYMAY (Friend) [phone] -- [phone]              [place]: NPI [id number] Tax ID [id number]  Emergency Contact Information       Name Relation Home Work Mobile    DERRELL ABREU Friend [phone]          Other Contacts    None on File          History & Physical    "     Rosi Medina MD at [phone]              [facility]  HISTORY AND PHYSICAL    Patient Name: Kristen Jean  : 1960  MRN: [id number]  Primary Care Physician: Moiz Nova MD  Date of admission: 2025      Subjective  Subjective     Chief Complaint:  Pneumonia    HPI:  Kristen Jean is a 64 y.o. female with a PMH significant for pulmonary Mycobacterium avium complex diagnosed 2015, end-stage COPD, COPD/asthma overlap syndrome, advanced emphysema, anxiety and protein calorie malnutrition with COPD cachexia presenting to [facility] ED secondary to worsening shortness of breath, higher oxygen requirements, right-sided pleuritic chest pain, pain in lower chest on the right side.  She states her symptoms have been ongoing for over a week, 2 days ago she bent over and felt as if she tore something in her right lower chest, felt a tearing sensation.  She does endorse pleuritic chest pain with increased cough however denies increased phlegm production.  She states she lives in an apartment building for the past 4 years and is exposed to significant amounts of secondhand smoke, all 4 of her neighbors all smoke indoors and oftentimes smoke enters her apartment as well.  She has an air purifier (2 of them) without significant relief.  She states her last cigarette was in 2024.  She typically wears 3 L nasal cannula at baseline however has been requiring up to 5 to 6 L and satting at 93%.  She follows pulmonology at [facility] and ID at [facility]  At this time she denies any urinary symptoms, GI symptoms, nausea, vomiting, hematemesis, hemoptysis, diarrhea, chest pain or palpitations.    In the ED, she was hemodynamically stable however tachycardic.  Troponins 11 (equivocal), proBNP 264, sodium 127, potassium 3.4, chloride 90, creatinine 0.31, glucose 131, alk phos 201, albumin 3.2.  Leukocytosis  with WBC count 16.67 with hemoglobin 10.1.  Underwent CXR which revealed hyperinflated lungs with underlying COPD/emphysema, interstitial opacities within the right lung likely representing infectious/inflammatory process, new blood within the superior lateral aspect of the right upper lobe with surrounding thickening of the wall.  CT chest without contrast revealed right lung multifocal pneumonia, decrease in size of RLL pulmonary nodule, no suspicious new nodule identified.  QTc 408.  Received cefepime and Zyvox in the ED.  Hospitalist team was contacted, agreed to admit.        Personal History     Past Medical History:   Diagnosis Date    Breast injury            Past Surgical History:   Procedure Laterality Date    TOOTH EXTRACTION      1990 and 2002       Family History: family history includes Dementia in her father.     Social History:  reports that she has been smoking. She does not have any smokeless tobacco history on file. She reports that she does not drink alcohol and does not use drugs.  Social History     Social History Narrative    Not on file       Medications:  Available home medication information reviewed.  Fluticasone Furoate-Vilanterol, LORazepam, ipratropium-albuterol, and pantoprazole    Allergies   Allergen Reactions    Codeine     Fluticasone-Salmeterol     Penicillins        Objective  Objective     Vital Signs:   Temp:  [98.2 °F (36.8 °C)] 98.2 °F (36.8 °C)  Heart Rate:  [] 109  Resp:  [16-18] 18  BP: (101-122)/(40-69) 104/68  Flow (L/min) (Oxygen Therapy):  [2-6] 2       Physical Exam   Constitutional: Awake, alert patient appears very cachectic likely related to end-stage COPD  Eyes: PERRLA, sclerae anicteric, no conjunctival injection  HENT: NCAT, mucous membranes moist  Neck: Supple, no thyromegaly, no lymphadenopathy, trachea midline  Respiratory: Decreased air entry bilaterally on auscultation, rhonchi heard right lung base, significant wheezing  Cardiovascular: RRR, no  murmurs, rubs, or gallops, palpable pedal pulses bilaterally  Gastrointestinal: Positive bowel sounds, soft, nontender, nondistended  Musculoskeletal: No bilateral ankle edema, no clubbing or cyanosis to extremities  Psychiatric: Appropriate affect, cooperative  Neurologic: Oriented x 3, strength symmetric in all extremities, Cranial Nerves grossly intact to confrontation, speech clear  Skin: No rashes      Result Review:  I have personally reviewed the results from the time of this admission to 1/9/2025 23:17 EST and agree with these findings:  [x]  Laboratory list / accordion  []  Microbiology  [x]  Radiology  [x]  EKG/Telemetry   [x]  Cardiology/Vascular   []  Pathology  [x]  Old records  []  Other:    LAB RESULTS:      Lab 01/09/25  1807 01/09/25  1622   WBC  --  16.67*   HEMOGLOBIN  --  10.1*   HEMATOCRIT  --  30.4*   PLATELETS  --  493*   NEUTROS ABS  --  12.88*   IMMATURE GRANS (ABS)  --  0.09*   LYMPHS ABS  --  1.74   MONOS ABS  --  1.50*   EOS ABS  --  0.42*   MCV  --  79.8   SED RATE  --  113*   CRP 13.13*  --    PROCALCITONIN 0.09  --          Lab 01/09/25  1807 01/09/25  1622   SODIUM  --  127*   POTASSIUM  --  3.4*   CHLORIDE  --  90*   CO2  --  27.0   ANION GAP  --  10.0   BUN  --  5*   CREATININE  --  0.31*   EGFR  --  118.4   GLUCOSE  --  131*   CALCIUM  --  9.1   HEMOGLOBIN A1C  --  6.00*   TSH 0.160*  --          Lab 01/09/25  1622   TOTAL PROTEIN 6.1   ALBUMIN 3.2*   GLOBULIN 2.9   ALT (SGPT) 21   AST (SGOT) 29   BILIRUBIN 0.3   ALK PHOS 201*   LIPASE 18         Lab 01/09/25  1807 01/09/25  1622   PROBNP  --  264.7   HSTROP T 12 11         Lab 01/09/25  1807   CHOLESTEROL 144   LDL CHOL 88   HDL CHOL 41   TRIGLYCERIDES 74                 Microbiology Results (last 10 days)       ** No results found for the last 240 hours. **            CT Chest Without Contrast Diagnostic    Result Date: 1/9/2025  CT CHEST WO CONTRAST DIAGNOSTIC Date of Exam: 1/9/2025 5:35 PM EST Indication: cough, wheezing, right  sided pain with HX of MAC. Comparison: 9/20/2022 Technique: Axial CT images were obtained of the chest without contrast administration.  Reconstructed coronal and sagittal images were also obtained. Automated exposure control and iterative construction methods were used. Findings: MEDIASTINUM: Unremarkable. Aortic and heart size are normal. No mass nor pericardial effusion. CORONARY ARTERIES: No calcified atherosclerotic disease. LUNGS: There is inferior right upper lobe and right middle lobe airspace disease consistent multifocal pneumonia. This is superimposed on a background pattern of emphysema and partially calcified opacities in both lungs which are similar. Previous 12 mm right lower lobe nodule now measures 8 mm. No new/suspicious nodule is identified. PLEURAL SPACE: No effusion, mass, nor pneumothorax. LYMPH NODES: There are no pathologically enlarged lymph nodes. UPPER ABDOMEN: Unremarkable OSSEOUS STRUCTURES: Appropriate for age with no acute process identified.     Impression: Impression: 1.Right lung multifocal pneumonia 2.Decrease in size of right lower lobe pulmonary nodule. No new suspicious nodule identified. 3.Other chronic findings as discussed. Electronically Signed: Gustavo Proctor MD  1/9/2025 6:07 PM EST  Workstation ID: [id number]    XR Chest 1 View    Result Date: 1/9/2025  XR CHEST 1 VW Date of Exam: 1/9/2025 4:50 PM EST Indication: Chest Pain Triage Protocol Comparison: CT chest dated 9/20/2022 Findings: The cardiomediastinal silhouette is within normal limits. Pulmonary vascularity appears normal. The lungs are hyperinflated with with underlying COPD/emphysema. There are interstitial opacities within the right lung likely representing infectious or inflammatory process. There is biapical scarring. There is a bleb within the superior lateral aspect of the right upper lobe with surrounding thickened wall. This is new from prior exam. There is no pleural effusion.     Impression: Impression:  1.Hyperinflated lungs with underlying COPD/emphysema. 2.Interstitial opacities within the right lung likely representing infectious or inflammatory process. 3.New bleb within the superior lateral aspect of the right upper lobe with surrounding thickening of the wall. Electronically Signed: Pito Hutton  1/9/2025 5:14 PM EST  Workstation ID: [id number]         Assessment & Plan  Assessment & Plan       Pneumonia    Chronic bronchitis    Mycobacterium infection    Emphysema of lung    Asthma-COPD overlap syndrome    Anxiety disorder    Multifocal pneumonia    Hyponatremia    Hypokalemia    Protein calorie malnutrition    Cachexia    End stage COPD    Hypothyroidism (acquired)    Mycobacterium avium complex    T2DM (type 2 diabetes mellitus)    Kristen Jean is a 64 y.o. female with a PMH significant for pulmonary Mycobacterium avium complex diagnosed 2015, end-stage COPD, COPD/asthma overlap syndrome, advanced emphysema, anxiety and protein calorie malnutrition with COPD cachexia presenting to [facility] ED secondary to worsening shortness of breath, higher oxygen requirements, right-sided pleuritic chest pain, pain in lower chest on the right side.  She states her symptoms have been ongoing for over a week, 2 days ago she bent over and felt as if she tore something in her right lower chest, felt a tearing sensation.  She does endorse pleuritic chest pain with increased cough however denies increased phlegm production.  Currently admitted for multifocal pneumonia.    Pneumonia  Multifocal pneumonia  Mycobacterium avium complex  End-stage COPD  Asthma - COPD overlap syndrome  Emphysema of the lung  Chronic bronchitis  Patient has a history that is significant for advanced COPD with a history of asthma - COPD overlap syndrome, follows [facility] pulmonology  - Previous PFTs April 2023, appears has not been interpreted officially however FEV1 to FVC ratio significantly low 40% postbronchodilator, TLC  132, significantly high residual volume, DLCO 47%.  - Does have a history of Mycobacterium AVM complex, appears on CT that her previous RLL odule has decreased in size  - CT chest without contrast revealed multifocal pneumonia more pronounced RML/RLL  - Management of pneumonia does become somewhat complicated due to underlying MAC, patient is currently on rifampin with recent fill history and azithromycin  - MRSA swab, streptococcal Legionella urine antigens, respiratory culture and respiratory viral PCR panel ordered  - Will initiate Merrem and Zyvox for now  - Consult to ID in a.m., will require guidance on antimicrobial regimen and duration  - Consult to pulmonology in a.m. due to extent of pulmonary disease  - DuoNebs, Tessalon Perles and albuterol inhaler  - Continue nasal cannula oxygen and wean as tolerated  - Needs goals of care discussion regarding prognosis.    Hyponatremia  Hypokalemia  Suspect SIADH in the setting of pneumonia versus hypovolemic hyponatremia  - Will check urine sodium, serum osmolarity, urine osmolarity, TSH and a.m. cortisol  - TSH 0.16, free T4 1.36, likely subclinical thyroiditis in the setting of pneumonia  - Will administer normal saline drip for short period as she does endorse that she has not been eating or drinking well.  She looks dry with delayed cap refill  - Potassium replacement protocol in place    Protein calorie malnutrition  Cachexia of end-stage lung disease  Patient has low albumin, appears cachectic likely secondary to advanced lung disease  - Nutrition consult for assessment of malnutrition    Goals of care  I attempted to touch on the topic of CODE STATUS, being DNR/DNI, patient stated that she wants to think about it I explained to her regarding her prognosis and how advanced her lung disease is and she understands this.  I mentioned that if she was to undergo a cardiac arrest that chest compressions would likely break her bones (ribs) and if she was to be  intubated given the significant amount of blebs that she has it would increase her risk of barotrauma related pneumothorax.  She stated that she will think about it and let us know.    Hypothyroidism  Likely subclinical thyroiditis with low TSH and normal free T4, in the setting of pneumonia  Will need repeat of thyroid function in 6 to 8 weeks.    T2DM/prediabetes  Check HbA1c and lipid panel    VTE Prophylaxis:  Pharmacologic & mechanical VTE prophylaxis orders are present.    Total time spent: 85 minutes  Time spent includes time reviewing chart, face-to-face time, counseling patient/family/caregiver, ordering medications/tests/procedures, communicating with other health care professionals, documenting clinical information in the electronic health record, and coordination of care.      CODE STATUS:    Code Status and Medical Interventions: CPR (Attempt to Resuscitate); Full Support   Ordered at: 01/09/25 2009     Level Of Support Discussed With:    Patient     Code Status (Patient has no pulse and is not breathing):    CPR (Attempt to Resuscitate)     Medical Interventions (Patient has pulse or is breathing):    Full Support       Expected Discharge   Expected discharge date/ time has not been documented.     Rosi Medina MD  01/09/25      Electronically signed by Rosi Medina MD at 01/09/25 5577          Emergency Department Notes        Mely Grimes, RN at 01/09/25 1937           Kristen JOY Wagner    Nursing Report ED to Floor:  Mental status: A/Ox4  Ambulatory status: Assist x2  Oxygen Therapy:  3L NC (Baseline)   Cardiac Rhythm: NSR to sinus tach   Admitted from: Home  Safety Concerns:  Fall risk   Social Issues: Discuss with RN   ED Room #:  28    ED Nurse Phone Extension - [phone] or may call [phone].      HPI:   Chief Complaint   Patient presents with    Shortness of Breath       Past Medical History:  Past Medical History:   Diagnosis Date    Breast injury         Past Surgical History:  Past  Surgical History:   Procedure Laterality Date    TOOTH EXTRACTION      1990 and 2002        Admitting Doctor:   Rosi Medina MD    Consulting Provider(s):  Consults       No orders found from 12/11/2024 to 1/10/2025.             Admitting Diagnosis:   The primary encounter diagnosis was Multifocal pneumonia. Diagnoses of History of MAC infection, Bandemia, and Hyponatremia were also pertinent to this visit.    Most Recent Vitals:   Vitals:    01/09/25 1822 01/09/25 1838 01/09/25 1842 01/09/25 1900   BP: 114/50 109/50 109/50 122/65   BP Location:       Patient Position:       Pulse: 93 96 99 94   Resp:       Temp:       TempSrc:       SpO2: 99% 99% 98% 98%   Weight:       Height:           Active LDAs/IV Access:   Lines, Drains & Airways       Active LDAs       Name Placement date Placement time Site Days    Peripheral IV 01/09/25 1612 Left Antecubital 01/09/25  1612  Antecubital  less than 1                    Labs (abnormal labs have a star):   Labs Reviewed   COMPREHENSIVE METABOLIC PANEL - Abnormal; Notable for the following components:       Result Value    Glucose 131 (*)     BUN 5 (*)     Creatinine 0.31 (*)     Sodium 127 (*)     Potassium 3.4 (*)     Chloride 90 (*)     Albumin 3.2 (*)     Alkaline Phosphatase 201 (*)     All other components within normal limits    Narrative:     GFR Categories in Chronic Kidney Disease (CKD)      GFR Category          GFR (mL/min/1.73)    Interpretation  G1                     90 or greater         Normal or high (1)  G2                      60-89                Mild decrease (1)  G3a                   45-59                Mild to moderate decrease  G3b                   30-44                Moderate to severe decrease  G4                    15-29                Severe decrease  G5                    14 or less           Kidney failure          (1)In the absence of evidence of kidney disease, neither GFR category G1 or G2 fulfill the criteria for CKD.    eGFR  calculation 2021 CKD-EPI creatinine equation, which does not include race as a factor   CBC WITH AUTO DIFFERENTIAL - Abnormal; Notable for the following components:    WBC 16.67 (*)     Hemoglobin 10.1 (*)     Hematocrit 30.4 (*)     MCH 26.5 (*)     Platelets 493 (*)     Neutrophil % 77.4 (*)     Lymphocyte % 10.4 (*)     Neutrophils, Absolute 12.88 (*)     Monocytes, Absolute 1.50 (*)     Eosinophils, Absolute 0.42 (*)     Immature Grans, Absolute 0.09 (*)     All other components within normal limits   TROPONIN - Normal    Narrative:     High Sensitive Troponin T Reference Range:  <14.0 ng/L- Negative Female for AMI  <22.0 ng/L- Negative Male for AMI  >=14 - Abnormal Female indicating possible myocardial injury.  >=22 - Abnormal Male indicating possible myocardial injury.   Clinicians would have to utilize clinical acumen, EKG, Troponin, and serial changes to determine if it is an Acute Myocardial Infarction or myocardial injury due to an underlying chronic condition.        LIPASE - Normal   BNP (IN-HOUSE) - Normal    Narrative:     This assay is used as an aid in the diagnosis of individuals suspected of having heart failure. It can be used as an aid in the diagnosis of acute decompensated heart failure (ADHF) in patients presenting with signs and symptoms of ADHF to the emergency department (ED). In addition, NT-proBNP of <300 pg/mL indicates ADHF is not likely.    Age Range Result Interpretation  NT-proBNP Concentration (pg/mL:      <50             Positive            >450                   Gray                 300-450                    Negative             <300    50-75           Positive            >900                  Gray                300-900                  Negative            <300      >75             Positive            >1800                  Gray                300-1800                  Negative            <300   HIGH SENSITIVITIY TROPONIN T 1HR - Normal    Narrative:     High Sensitive Troponin  T Reference Range:  <14.0 ng/L- Negative Female for AMI  <22.0 ng/L- Negative Male for AMI  >=14 - Abnormal Female indicating possible myocardial injury.  >=22 - Abnormal Male indicating possible myocardial injury.   Clinicians would have to utilize clinical acumen, EKG, Troponin, and serial changes to determine if it is an Acute Myocardial Infarction or myocardial injury due to an underlying chronic condition.        RAINBOW DRAW    Narrative:     The following orders were created for panel order [place] Draw.  Procedure                               Abnormality         Status                     ---------                               -----------         ------                     Green Top (Gel)[[id number]]                                  Final result               Lavender Top[[id number]]                                     Final result               Red Top[[id number]]                                                                     Gold Top - SST[[id number]]                                   Final result               Green Top (No Gel)[[id number]]                                                          Light Blue Top[[id number]]                                   Final result                 Please view results for these tests on the individual orders.   CBC AND DIFFERENTIAL    Narrative:     The following orders were created for panel order CBC & Differential.  Procedure                               Abnormality         Status                     ---------                               -----------         ------                     CBC Auto Differential[[id number]]        Abnormal            Final result                 Please view results for these tests on the individual orders.   GREEN TOP   LAVENDER TOP   GOLD TOP - SST   LIGHT BLUE TOP       Meds Given in ED:   Medications   sodium chloride 0.9 % flush 10 mL (has no administration in time range)   aspirin chewable tablet 324 mg (324 mg Oral Not Given  1/9/25 1744)   Linezolid (ZYVOX) 600 mg 300 mL (0 mg Intravenous Stopped 1/9/25 1843)   cefepime 2000 mg IVPB in 100 mL NS (MBP) (0 mg Intravenous Stopped 1/9/25 1817)           Last NIH score:                                                          Dysphagia screening results:        [place] Coma Scale:  No data recorded     CIWA:        Restraint Type:            Isolation Status:  No active isolations           Electronically signed by Mely Grimes RN at 01/09/25 1939       Ezequiel Eaton DO at 01/09/25 1615            [place]    EMERGENCY DEPARTMENT ENCOUNTER      Pt Name: Kristen Jean  MRN: [id number]  YOB: 1960  Date of evaluation: 1/9/2025  Provider: Ezequiel Eaton DO    CHIEF COMPLAINT       Chief Complaint   Patient presents with    Shortness of Breath     HPI  Stated Reason for Visit: pt presenting today with complaints of soa with dx of MAC, asthma and COPD, also complaining of R substernal and pluritic pain. 7/10. pt currently on 6L baseline wears 3L. pt recieved due-neb with improvement. pain still present at this time. History Obtained From: EMS     HISTORY OF PRESENT ILLNESS  (Location/Symptom, Timing/Onset, Context/Setting, Quality, Duration, Modifying Factors, Severity.)   Kristen Jean is a 64 y.o. female who presents to the emergency department for evaluation with shortness of breath cough and congestion over the last 3 to 4 days.  She notes right sided substernal pain which is consistent with her underlying MAC when she has flares and nodules.  She follows upcoming with pulmonologist Dr. Estrada, as well as infectious disease through [facility].  She is on chronic rifampin Zithromax and Levaquin for her underlying MAC, wears 3 L nasal cannula at baseline.  Has been compliant with her medications.  She has mild sputum production without hemoptysis.  She denies any fever or chills, she notes generalized pain worse with deep inspiration.  She received  DuoNeb therapy and route with some moderate relief.  Still has some right-sided chest discomfort.      Nursing notes were reviewed.      PAST MEDICAL HISTORY     Past Medical History:   Diagnosis Date    Breast injury          SURGICAL HISTORY       Past Surgical History:   Procedure Laterality Date    TOOTH EXTRACTION      1990 and 2002         CURRENT MEDICATIONS       Current Facility-Administered Medications:     aspirin chewable tablet 324 mg, 324 mg, Oral, Once, Emergency, Triage Protocol, MD    sodium chloride 0.9 % flush 10 mL, 10 mL, Intravenous, PRN, Emergency, Triage Protocol, MD    Current Outpatient Medications:     albuterol (PROVENTIL) (2.5 MG/3ML) 0.083% nebulizer solution,  INHALE THE CONTENTS OF ONE VIAL VIA NEBULIZER BY MOUTH EVERY 4 TO 6 HOURS AS NEEDED FOR WHEEZING, Disp: , Rfl:     ibuprofen (ADVIL,MOTRIN) 200 MG tablet, Take 200 mg by mouth every 6 (six) hours as needed for mild pain (1-3)., Disp: , Rfl:     SYMBICORT 80-4.5 MCG/ACT inhaler, , Disp: , Rfl:     ALLERGIES     Codeine, Fluticasone-salmeterol, and Penicillins    FAMILY HISTORY       Family History   Problem Relation Age of Onset    Dementia Father     Breast cancer Neg Hx     Ovarian cancer Neg Hx           SOCIAL HISTORY       Social History     Socioeconomic History    Marital status: Single   Tobacco Use    Smoking status: Every Day   Substance and Sexual Activity    Alcohol use: No         PHYSICAL EXAM    (up to 7 for level 4, 8 or more for level 5)     Vitals:    01/09/25 1822 01/09/25 1838 01/09/25 1842 01/09/25 1900   BP: 114/50 109/50 109/50 122/65   BP Location:       Patient Position:       Pulse: 93 96 99 94   Resp:       Temp:       TempSrc:       SpO2: 99% 99% 98% 98%   Weight:       Height:           Physical Exam  General : Patient is awake, alert, oriented, in no acute distress, nontoxic appearing  HEENT: Pupils are equally round, EOMI, conjunctivae clear  Neck: Neck is supple, full range of motion, trachea  midline  Cardiac: Heart regular rate, rhythm, no murmurs, rubs, or gallops  Lungs: Lungs decreased breath sounds bilaterally, scattered rhonchi to bilateral bases, faint expiratory wheezes, oxygenation is 98% on 3 L nasal cannula.  Chest wall: There is no tenderness to palpation over the chest wall or over ribs  Abdomen: Abdomen is soft, nontender, nondistended. There are no firm or pulsatile masses, no rebound rigidity or guarding  Musculoskeletal: No peripheral edema, 5 out of 5 strength in all 4 extremities.  No focal muscle deficits are appreciated  Neuro: Motor intact, sensory intact, level of consciousness is normal  Dermatology: Skin is warm and dry  Psych: Mentation is grossly normal, cognition is grossly normal. Affect is appropriate      DIAGNOSTIC RESULTS     EKG:  All EKGs are interpreted by the Emergency Department Physician who either signs or Co-signs this chart in the absence of a cardiologist.    ECG 12 Lead ED Triage Standing Order; Chest Pain   Final Result   Test Reason : ED Triage Standing Order~   Blood Pressure :   */*   mmHG   Vent. Rate :  97 BPM     Atrial Rate :  97 BPM      P-R Int : 146 ms          QRS Dur :  76 ms       QT Int : 322 ms       P-R-T Axes :  74  62  58 degrees     QTcB Int : 408 ms      Sinus rhythm with occasional premature ventricular complexes and premature   atrial complexes   Otherwise normal ECG   When compared with ECG of 09-Jan-2025 16:17,   premature ventricular complexes are now present   Confirmed by MICAH PERES MD ([id number]) on 1/9/2025 6:39:29 PM      Referred By: EDMD           Confirmed By: MICAH PERES MD      ECG 12 Lead ED Triage Standing Order; Chest Pain   Final Result   Test Reason : ED Triage Standing Order~   Blood Pressure :   */*   mmHG   Vent. Rate : 112 BPM     Atrial Rate : 112 BPM      P-R Int : 140 ms          QRS Dur :  68 ms       QT Int : 290 ms       P-R-T Axes :  79  43  64 degrees     QTcB Int : 395 ms         Sinus tachycardia with  premature atrial complexes   Anterior infarct , age undetermined   Abnormal ECG   No previous ECGs available   Confirmed by MICAH PERES MD ([id number]) on 1/9/2025 4:27:17 PM      Referred By: ED           Confirmed By: MICAH PERES MD          RADIOLOGY:     [x] Radiologist's Report Reviewed:  CT Chest Without Contrast Diagnostic   Final Result   Impression:   1.Right lung multifocal pneumonia   2.Decrease in size of right lower lobe pulmonary nodule. No new suspicious nodule identified.   3.Other chronic findings as discussed.            Electronically Signed: Gustavo Proctor MD     1/9/2025 6:07 PM EST     Workstation ID: [id number]      XR Chest 1 View   Final Result   Impression:   1.Hyperinflated lungs with underlying COPD/emphysema.   2.Interstitial opacities within the right lung likely representing infectious or inflammatory process.   3.New bleb within the superior lateral aspect of the right upper lobe with surrounding thickening of the wall.            Electronically Signed: Pito Hutton     1/9/2025 5:14 PM EST     Workstation ID: [id number]          I ordered and independently reviewed the above noted radiographic studies.      I viewed images of chest x-ray which showed right-sided multiple opacities, inflammatory/infectious process per my independent interpretation.    See radiologist's dictation for official interpretation.        LABS:    I have reviewed and interpreted all of the currently available lab results from this visit (if applicable):  Results for orders placed or performed during the hospital encounter of 01/09/25   ECG 12 Lead ED Triage Standing Order; Chest Pain    Collection Time: 01/09/25  4:17 PM   Result Value Ref Range    QT Interval 290 ms    QTC Interval 395 ms   High Sensitivity Troponin T    Collection Time: 01/09/25  4:22 PM    Specimen: Blood   Result Value Ref Range    HS Troponin T 11 <14 ng/L   Comprehensive Metabolic Panel    Collection Time: 01/09/25  4:22 PM    Specimen:  Blood   Result Value Ref Range    Glucose 131 (H) 65 - 99 mg/dL    BUN 5 (L) 8 - 23 mg/dL    Creatinine 0.31 (L) 0.57 - 1.00 mg/dL    Sodium 127 (L) 136 - 145 mmol/L    Potassium 3.4 (L) 3.5 - 5.2 mmol/L    Chloride 90 (L) 98 - 107 mmol/L    CO2 27.0 22.0 - 29.0 mmol/L    Calcium 9.1 8.6 - 10.5 mg/dL    Total Protein 6.1 6.0 - 8.5 g/dL    Albumin 3.2 (L) 3.5 - 5.2 g/dL    ALT (SGPT) 21 1 - 33 U/L    AST (SGOT) 29 1 - 32 U/L    Alkaline Phosphatase 201 (H) 39 - 117 U/L    Total Bilirubin 0.3 0.0 - 1.2 mg/dL    Globulin 2.9 gm/dL    A/G Ratio 1.1 g/dL    BUN/Creatinine Ratio 16.1 7.0 - 25.0    Anion Gap 10.0 5.0 - 15.0 mmol/L    eGFR 118.4 >60.0 mL/min/1.73   Lipase    Collection Time: 01/09/25  4:22 PM    Specimen: Blood   Result Value Ref Range    Lipase 18 13 - 60 U/L   BNP    Collection Time: 01/09/25  4:22 PM    Specimen: Blood   Result Value Ref Range    proBNP 264.7 0.0 - 900.0 pg/mL   CBC Auto Differential    Collection Time: 01/09/25  4:22 PM    Specimen: Blood   Result Value Ref Range    WBC 16.67 (H) 3.40 - 10.80 10*3/mm3    RBC 3.81 3.77 - 5.28 10*6/mm3    Hemoglobin 10.1 (L) 12.0 - 15.9 g/dL    Hematocrit 30.4 (L) 34.0 - 46.6 %    MCV 79.8 79.0 - 97.0 fL    MCH 26.5 (L) 26.6 - 33.0 pg    MCHC 33.2 31.5 - 35.7 g/dL    RDW 14.8 12.3 - 15.4 %    RDW-SD 42.9 37.0 - 54.0 fl    MPV 9.1 6.0 - 12.0 fL    Platelets 493 (H) 140 - 450 10*3/mm3    Neutrophil % 77.4 (H) 42.7 - 76.0 %    Lymphocyte % 10.4 (L) 19.6 - 45.3 %    Monocyte % 9.0 5.0 - 12.0 %    Eosinophil % 2.5 0.3 - 6.2 %    Basophil % 0.2 0.0 - 1.5 %    Immature Grans % 0.5 0.0 - 0.5 %    Neutrophils, Absolute 12.88 (H) 1.70 - 7.00 10*3/mm3    Lymphocytes, Absolute 1.74 0.70 - 3.10 10*3/mm3    Monocytes, Absolute 1.50 (H) 0.10 - 0.90 10*3/mm3    Eosinophils, Absolute 0.42 (H) 0.00 - 0.40 10*3/mm3    Basophils, Absolute 0.04 0.00 - 0.20 10*3/mm3    Immature Grans, Absolute 0.09 (H) 0.00 - 0.05 10*3/mm3    nRBC 0.0 0.0 - 0.2 /100 WBC   Green Top (Gel)     Collection Time: 01/09/25  4:22 PM   Result Value Ref Range    Extra Tube Hold for add-ons.    Lavender Top    Collection Time: 01/09/25  4:22 PM   Result Value Ref Range    Extra Tube hold for add-on    Gold Top - SST    Collection Time: 01/09/25  4:22 PM   Result Value Ref Range    Extra Tube Hold for add-ons.    Light Blue Top    Collection Time: 01/09/25  4:22 PM   Result Value Ref Range    Extra Tube Hold for add-ons.    ECG 12 Lead ED Triage Standing Order; Chest Pain    Collection Time: 01/09/25  6:00 PM   Result Value Ref Range    QT Interval 322 ms    QTC Interval 408 ms   High Sensitivity Troponin T 1Hr    Collection Time: 01/09/25  6:07 PM    Specimen: Blood   Result Value Ref Range    HS Troponin T 12 <14 ng/L    Troponin T Numeric Delta 1 Abnormal if >/=3 ng/L        If labs were ordered, I independently reviewed the results and considered them in treating the patient.      EMERGENCY DEPARTMENT COURSE and DIFFERENTIAL DIAGNOSIS/MDM:   Vitals:  AS OF 19:12 EST    BP - 122/65  HR - 94  TEMP - 98.2 °F (36.8 °C) (Oral)  O2 SATS - 98%      Orders placed during this visit:  Orders Placed This Encounter   Procedures    XR Chest 1 View    CT Chest Without Contrast Diagnostic    [place] Draw    High Sensitivity Troponin T    Comprehensive Metabolic Panel    Lipase    BNP    CBC Auto Differential    High Sensitivity Troponin T 1Hr    NPO Diet NPO Type: Strict NPO    Undress & Gown    Continuous Pulse Oximetry    Oxygen Therapy- Nasal Cannula; Titrate 1-6 LPM Per SpO2; 90 - 95%    ECG 12 Lead ED Triage Standing Order; Chest Pain    ECG 12 Lead ED Triage Standing Order; Chest Pain    Insert Peripheral IV    CBC & Differential    Green Top (Gel)    Lavender Top    Gold Top - SST    Light Blue Top       All labs have been independently reviewed by me.  All radiology studies have been reviewed by me and the radiologist dictating the report.  All EKG's have been independently viewed and interpreted by me.       Discussion below represents my analysis of pertinent findings related to patient's condition, differential diagnosis, treatment plan and final disposition.    Differential diagnosis:  The differential diagnosis associated with the patient's presentation includes: Reactive airway disease, obstructive lung disease, MAC pneumonia, pleurisy    Additional sources  Discussed/ obtained information from independent historians:   [] Spouse  [] Parent  [] Family member  [] Friend  [x] EMS   [] Other:    External (non-ED) record review:   [] Inpatient record:   [x] Office record: Virginia Malagon MD Infectious Disease at : Patient positive for MAC, has had some multiple medication changes, currently on azithromycin, Levaquin, rifampin   [] Outpatient record:   [] Prior Outpatient labs:   [] Prior Outpatient radiology:   [] Primary Care record:   [] Outside ED record:   [] Other:     Patient's care impacted by:   [] Diabetes  [] Hypertension  [] CHF  [] Hyperlipidemia  [] Coronary Artery Disease   [x] COPD   [] Cancer   [] Tobacco Abuse   [] Substance Abuse    [x] Other: MAC    Care significantly affected by Social Determinants of Health (housing and economic circumstances, unemployment)    [] Yes     [x] No   If yes, Patient's care significantly limited by Social Determinants of Health including:   [] Inadequate housing   [] Low income   [] Alcoholism and drug addiction in family   [] Problems related to primary support group   [] Unemployment   [] Problems related to employment   [] Other Social Determinants of Health:       MEDICATIONS ADMINISTERED IN ED:  Medications   sodium chloride 0.9 % flush 10 mL (has no administration in time range)   aspirin chewable tablet 324 mg (324 mg Oral Not Given 1/9/25 1744)   Linezolid (ZYVOX) 600 mg 300 mL (0 mg Intravenous Stopped 1/9/25 1843)   cefepime 2000 mg IVPB in 100 mL NS (MBP) (0 mg Intravenous Stopped 1/9/25 1817)              This is a pleasant 54 old female with a history of  respiratory disease, 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  With her known history of MAC we did obtain blood work labs, CT scan of the chest for further assessment, results as above.  Labs have a CBC white count of 16.6 with a hemoglobin of 10, positive left shift.  Creatinine 0.31, sodium 127 with a glucose of 131.  Patient has normal cardiac function, imaging including chest x-ray and CT scan reveals a right sided inflammatory multifocal process likely underlying recurrent pneumonia with a positive below, potentially postobstructive infection.  After further discussion with pharmacy we discussed moving forward with cefepime, Zyvox she has been previously treated for MAC with chronic antibiotics including rifampin, Zithromax and Levaquin.  Patient is requiring couple liters and increased nasal cannula oxygen compared to her baseline.  We discussed giving her extra oxygen demand, need for stronger IV antibiotics likely pulmonary infectious disease consultation we will plan for admission to the hospital for further workup and treatment.  Case discussed with hospitalist Dr. Zaldivar for admission.        PROCEDURES:  Procedures    CRITICAL CARE TIME    Total Critical Care time was 0 minutes, excluding separately reportable procedures.   There was a high probability of clinically significant/life threatening deterioration in the patient's condition which required my urgent intervention.      FINAL IMPRESSION      1. Multifocal pneumonia    2. History of MAC infection    3. Bandemia    4. Hyponatremia          DISPOSITION/PLAN     ED Disposition       ED Disposition   Decision to Admit    Condition   --    Comment   --               Comment: Please note this report has been produced using speech recognition software.      Ezequiel Eaton,    Attending Emergency Physician         Ezequiel Eaton DO  01/09/25 1915      Electronically signed by Ezequiel Eaton DO at 01/09/25 1915       Oxygen Therapy (last 2 days)       Date/Time SpO2 Device (Oxygen Therapy) Flow (L/min) (Oxygen Therapy) Oxygen Concentration (%) ETCO2 (mmHg)    01/10/25 0807 92 nasal cannula 2.5 -- --    01/10/25 0357 93 humidified;nasal cannula 2 -- --    01/09/25 2201 -- nasal cannula 2 -- --    01/09/25 2002 98 -- -- -- --    01/09/25 1942 98 -- -- -- --    01/09/25 1922 97 -- -- -- --    01/09/25 1900 98 -- -- -- --    01/09/25 1842 98 -- -- -- --    01/09/25 1838 99 -- -- -- --    01/09/25 1822 99 -- -- -- --    01/09/25 1758 98 -- -- -- --    01/09/25 1646 96 -- -- -- --    01/09/25 1609 98 nasal cannula 6 -- --          Current Facility-Administered Medications   Medication Dose Route Frequency Provider Last Rate Last Admin    !!! Please obtain patients home medications that are stored in central pharmacy and return to patient prior to discharge!   Not Applicable BID Nicole Ch, PharmD        acetaminophen (TYLENOL) tablet 650 mg  650 mg Oral Q4H PRN Rosi Medina MD        Or    acetaminophen (TYLENOL) 160 MG/5ML oral solution 650 mg  650 mg Oral Q4H PRN Rosi Medina MD        Or    acetaminophen (TYLENOL) suppository 650 mg  650 mg Rectal Q4H PRN Rosi Medina MD        aspirin chewable tablet 324 mg  324 mg Oral Once Rosi Medina MD        benzonatate (TESSALON) capsule 100 mg  100 mg Oral TID PRN Rosi Medina MD   100 mg at 01/10/25 0840    sennosides-docusate (PERICOLACE) 8.6-50 MG per tablet 2 tablet  2 tablet Oral BID PRN Rosi Medina MD        And    polyethylene glycol (MIRALAX) packet 17 g  17 g Oral Daily PRN Rosi Medina MD        And    bisacodyl (DULCOLAX) EC tablet 5 mg  5 mg Oral Daily PRN Rosi Medina MD        And    bisacodyl (DULCOLAX) suppository 10 mg  10 mg Rectal Daily PRN Carol,  MD Rosi        budesonide-formoterol (SYMBICORT) 160-4.5 MCG/ACT inhaler 2 puff  2 puff Inhalation BID - RT Rosi Medina MD        Calcium Replacement - Follow Nurse / BPA Driven Protocol   Not Applicable PRRosi English MD        Enoxaparin Sodium (LOVENOX) syringe 30 mg  30 mg Subcutaneous Daily Rosi Medina MD        ipratropium-albuterol (DUO-NEB) nebulizer solution 3 mL  3 mL Nebulization 4x Daily - RT Rosi Medina MD   3 mL at 01/09/25 2201    lactated ringers bolus 500 mL  500 mL Intravenous Once Rosi Medina MD        Linezolid (ZYVOX) 600 mg 300 mL  600 mg Intravenous Q12H Rosi Medina  mL/hr at 01/10/25 0559 600 mg at 01/10/25 0559    LORazepam (ATIVAN) tablet 0.5 mg  0.5 mg Oral BID PRN Rosi Medina MD   0.5 mg at 01/09/25 2207    Magnesium Standard Dose Replacement - Follow Nurse / BPA Driven Protocol   Not Applicable PRRosi English MD        melatonin tablet 5 mg  5 mg Oral Nightly Rosi Medina MD        meropenem (MERREM) 1,000 mg in sodium chloride 0.9 % 100 mL MBP  1,000 mg Intravenous Q8H Rosi Medina MD   1,000 mg at 01/10/25 0810    nitroglycerin (NITROSTAT) SL tablet 0.4 mg  0.4 mg Sublingual Q5 Min PRN Rosi Medina MD        pantoprazole (PROTONIX) EC tablet 40 mg  40 mg Oral Nightly Rosi Medina MD   40 mg at 01/09/25 2207    Phosphorus Replacement - Follow Nurse / BPA Driven Protocol   Not Applicable PRRosi English MD        Potassium Replacement - Follow Nurse / BPA Driven Protocol   Not Applicable Rosi Jarquin MD        sodium chloride 0.9 % flush 10 mL  10 mL Intravenous PRN Rosi Medina MD        sodium chloride 0.9 % flush 10 mL  10 mL Intravenous Q12H Rosi Medina MD   10 mL at 01/10/25 0811    sodium chloride 0.9 % flush 10 mL  10 mL Intravenous PRN Rosi Medina MD        sodium chloride 0.9 % infusion 40 mL  40 mL Intravenous PRN Rosi Medina MD        sodium  chloride 0.9 % infusion  100 mL/hr Intravenous Continuous Rosi Medina  mL/hr at 01/10/25 0124 100 mL/hr at 01/10/25 0124     Lab Results (all)       Procedure Component Value Units Date/Time    Osmolality, Urine - Urine, Clean Catch [[id number]]  (Abnormal) Collected: 01/10/25 0730    Specimen: Urine, Clean Catch Updated: 01/10/25 0753     Osmolality, Urine 227 mOsm/kg     Sodium, Urine, Random - Urine, Clean Catch [[id number]] Collected: 01/10/25 0730    Specimen: Urine, Clean Catch Updated: 01/10/25 0742     Sodium, Urine 29 mmol/L     Narrative:      Reference intervals for random urine have not been established.  Clinical usage is dependent upon physician's interpretation in combination with other laboratory tests.       Urinalysis With Microscopic If Indicated (No Culture) - Urine, Clean Catch [[id number]]  (Normal) Collected: 01/10/25 0730    Specimen: Urine, Clean Catch Updated: 01/10/25 0735     Color, UA Yellow     Appearance, UA Clear     pH, UA 7.0     Specific Gravity, UA 1.007     Glucose, UA Negative     Ketones, UA Negative     Bilirubin, UA Negative     Blood, UA Negative     Protein, UA Negative     Leuk Esterase, UA Negative     Nitrite, UA Negative     Urobilinogen, UA 1.0 E.U./dL    Narrative:      Urine microscopic not indicated.    S. Pneumo Ag Urine or CSF - Urine, Urine, Clean Catch [[id number]] Collected: 01/10/25 0729    Specimen: Urine, Clean Catch Updated: 01/10/25 0729    Legionella Antigen, Urine - Urine, Urine, Clean Catch [[id number]] Collected: 01/10/25 0729    Specimen: Urine, Clean Catch Updated: 01/10/25 0729    Respiratory Panel PCR w/COVID-19(SARS-CoV-2) MARIANELA/BRONWYN/LUZ MARINA/PAD/COR/KINGS In-House, NP Swab in UTM/VTM, 2 HR TAT - Swab, Nasopharynx [[id number]]  (Normal) Collected: 01/10/25 0133    Specimen: Swab from Nasopharynx Updated: 01/10/25 0238     ADENOVIRUS, PCR Not Detected     Coronavirus 229E Not Detected     Coronavirus HKU1 Not Detected     Coronavirus NL63 Not Detected  "    Coronavirus OC43 Not Detected     COVID19 Not Detected     Human Metapneumovirus Not Detected     Human Rhinovirus/Enterovirus Not Detected     Influenza A PCR Not Detected     Influenza B PCR Not Detected     Parainfluenza Virus 1 Not Detected     Parainfluenza Virus 2 Not Detected     Parainfluenza Virus 3 Not Detected     Parainfluenza Virus 4 Not Detected     RSV, PCR Not Detected     Bordetella pertussis pcr Not Detected     Bordetella parapertussis PCR Not Detected     Chlamydophila pneumoniae PCR Not Detected     Mycoplasma pneumo by PCR Not Detected    Narrative:      In the setting of a positive respiratory panel with a viral infection PLUS a negative procalcitonin without other underlying concern for bacterial infection, consider observing off antibiotics or discontinuation of antibiotics and continue supportive care. If the respiratory panel is positive for atypical bacterial infection (Bordetella pertussis, Chlamydophila pneumoniae, or Mycoplasma pneumoniae), consider antibiotic de-escalation to target atypical bacterial infection.    T4, Free [[id number]]  (Normal) Collected: 01/09/25 1807    Specimen: Blood Updated: 01/09/25 2151     Free T4 1.36 ng/dL     Procalcitonin [[id number]]  (Normal) Collected: 01/09/25 1807    Specimen: Blood Updated: 01/09/25 2111     Procalcitonin 0.09 ng/mL     Narrative:      As a Marker for Sepsis (Non-Neonates):    1. <0.5 ng/mL represents a low risk of severe sepsis and/or septic shock.  2. >2 ng/mL represents a high risk of severe sepsis and/or septic shock.    As a Marker for Lower Respiratory Tract Infections that require antibiotic therapy:    PCT on Admission    Antibiotic Therapy       6-12 Hrs later    >0.5                Strongly Recommended  >0.25 - <0.5        Recommended   0.1 - 0.25          Discouraged              Remeasure/reassess PCT  <0.1                Strongly Discouraged     Remeasure/reassess PCT    As 28 day mortality risk marker: \"Change in " "Procalcitonin Result\" (>80% or <=80%) if Day 0 (or Day 1) and Day 4 values are available. Refer to http://[email or web address]pct-calculator.com    Change in PCT <=80%  A decrease of PCT levels below or equal to 80% defines a positive change in PCT test result representing a higher risk for 28-day all-cause mortality of patients diagnosed with severe sepsis for septic shock.    Change in PCT >80%  A decrease of PCT levels of more than 80% defines a negative change in PCT result representing a lower risk for 28-day all-cause mortality of patients diagnosed with severe sepsis or septic shock.       Hemoglobin A1c [[id number]]  (Abnormal) Collected: 01/09/25 1622    Specimen: Blood Updated: 01/09/25 2109     Hemoglobin A1C 6.00 %     Narrative:      Hemoglobin A1C Ranges:    Increased Risk for Diabetes  5.7% to 6.4%  Diabetes                     >= 6.5%  Diabetic Goal                < 7.0%    TSH Rfx On Abnormal To Free T4 [[id number]]  (Abnormal) Collected: 01/09/25 1807    Specimen: Blood Updated: 01/09/25 2109     TSH 0.160 uIU/mL     CK [[id number]]  (Normal) Collected: 01/09/25 1807    Specimen: Blood Updated: 01/09/25 2103     Creatine Kinase 52 U/L     Lipid Panel [[id number]] Collected: 01/09/25 1807    Specimen: Blood Updated: 01/09/25 2103     Total Cholesterol 144 mg/dL      Triglycerides 74 mg/dL      HDL Cholesterol 41 mg/dL      LDL Cholesterol  88 mg/dL      VLDL Cholesterol 15 mg/dL      LDL/HDL Ratio 2.15    Narrative:      Cholesterol Reference Ranges  (U.S. Department of Health and Human Services ATP III Classifications)    Desirable          <200 mg/dL  Borderline High    200-239 mg/dL  High Risk          >240 mg/dL      Triglyceride Reference Ranges  (U.S. Department of Health and Human Services ATP III Classifications)    Normal           <150 mg/dL  Borderline High  150-199 mg/dL  High             200-499 mg/dL  Very High        >500 mg/dL    HDL Reference Ranges  (U.S. Department of Health and Human Services " ATP III Classifications)    Low     <40 mg/dl (major risk factor for CHD)  High    >60 mg/dl ('negative' risk factor for CHD)        LDL Reference Ranges  (U.S. Department of Health and Human Services ATP III Classifications)    Optimal          <100 mg/dL  Near Optimal     100-129 mg/dL  Borderline High  130-159 mg/dL  High             160-189 mg/dL  Very High        >189 mg/dL    C-reactive Protein [[id number]]  (Abnormal) Collected: 01/09/25 1807    Specimen: Blood Updated: 01/09/25 2103     C-Reactive Protein 13.13 mg/dL     Sedimentation Rate [[id number]]  (Abnormal) Collected: 01/09/25 1622    Specimen: Blood Updated: 01/09/25 2049     Sed Rate 113 mm/hr     High Sensitivity Troponin T 1Hr [[id number]]  (Normal) Collected: 01/09/25 1807    Specimen: Blood Updated: 01/09/25 1841     HS Troponin T 12 ng/L      Troponin T Numeric Delta 1 ng/L     Narrative:      High Sensitive Troponin T Reference Range:  <14.0 ng/L- Negative Female for AMI  <22.0 ng/L- Negative Male for AMI  >=14 - Abnormal Female indicating possible myocardial injury.  >=22 - Abnormal Male indicating possible myocardial injury.   Clinicians would have to utilize clinical acumen, EKG, Troponin, and serial changes to determine if it is an Acute Myocardial Infarction or myocardial injury due to an underlying chronic condition.         [place] Draw [[id number]] Collected: 01/09/25 1622    Specimen: Blood Updated: 01/09/25 1725    Narrative:      The following orders were created for panel order [place] Draw.  Procedure                               Abnormality         Status                     ---------                               -----------         ------                     Green Top (Gel)[[id number]]                                  Final result               Lavender Top[[id number]]                                     Final result               Red Top[[id number]]                                                                     Gold Top -  SST[[id number]]                                   Final result               Green Top (No Gel)[[id number]]                                                          Light Blue Top[[id number]]                                   Final result                 Please view results for these tests on the individual orders.    Comprehensive Metabolic Panel [[id number]]  (Abnormal) Collected: 01/09/25 1622    Specimen: Blood Updated: 01/09/25 1650     Glucose 131 mg/dL      BUN 5 mg/dL      Creatinine 0.31 mg/dL      Sodium 127 mmol/L      Potassium 3.4 mmol/L      Chloride 90 mmol/L      CO2 27.0 mmol/L      Calcium 9.1 mg/dL      Total Protein 6.1 g/dL      Albumin 3.2 g/dL      ALT (SGPT) 21 U/L      AST (SGOT) 29 U/L      Alkaline Phosphatase 201 U/L      Total Bilirubin 0.3 mg/dL      Globulin 2.9 gm/dL      Comment: Calculated Result        A/G Ratio 1.1 g/dL      BUN/Creatinine Ratio 16.1     Anion Gap 10.0 mmol/L      eGFR 118.4 mL/min/1.73     Narrative:      GFR Categories in Chronic Kidney Disease (CKD)      GFR Category          GFR (mL/min/1.73)    Interpretation  G1                     90 or greater         Normal or high (1)  G2                      60-89                Mild decrease (1)  G3a                   45-59                Mild to moderate decrease  G3b                   30-44                Moderate to severe decrease  G4                    15-29                Severe decrease  G5                    14 or less           Kidney failure          (1)In the absence of evidence of kidney disease, neither GFR category G1 or G2 fulfill the criteria for CKD.    eGFR calculation 2021 CKD-EPI creatinine equation, which does not include race as a factor    Lipase [[id number]]  (Normal) Collected: 01/09/25 1622    Specimen: Blood Updated: 01/09/25 1650     Lipase 18 U/L     High Sensitivity Troponin T [[id number]]  (Normal) Collected: 01/09/25 1622    Specimen: Blood Updated: 01/09/25 1650     HS Troponin T 11 ng/L      Narrative:      High Sensitive Troponin T Reference Range:  <14.0 ng/L- Negative Female for AMI  <22.0 ng/L- Negative Male for AMI  >=14 - Abnormal Female indicating possible myocardial injury.  >=22 - Abnormal Male indicating possible myocardial injury.   Clinicians would have to utilize clinical acumen, EKG, Troponin, and serial changes to determine if it is an Acute Myocardial Infarction or myocardial injury due to an underlying chronic condition.         BNP [[id number]]  (Normal) Collected: 01/09/25 1622    Specimen: Blood Updated: 01/09/25 1650     proBNP 264.7 pg/mL     Narrative:      This assay is used as an aid in the diagnosis of individuals suspected of having heart failure. It can be used as an aid in the diagnosis of acute decompensated heart failure (ADHF) in patients presenting with signs and symptoms of ADHF to the emergency department (ED). In addition, NT-proBNP of <300 pg/mL indicates ADHF is not likely.    Age Range Result Interpretation  NT-proBNP Concentration (pg/mL:      <50             Positive            >450                   Gray                 300-450                    Negative             <300    50-75           Positive            >900                  Gray                300-900                  Negative            <300      >75             Positive            >1800                  Gray                300-1800                  Negative            <300    CBC & Differential [[id number]]  (Abnormal) Collected: 01/09/25 1622    Specimen: Blood Updated: 01/09/25 1632    Narrative:      The following orders were created for panel order CBC & Differential.  Procedure                               Abnormality         Status                     ---------                               -----------         ------                     CBC Auto Differential[[id number]]        Abnormal            Final result                 Please view results for these tests on the individual orders.    CBC Auto  Differential [[id number]]  (Abnormal) Collected: 01/09/25 1622    Specimen: Blood Updated: 01/09/25 1632     WBC 16.67 10*3/mm3      RBC 3.81 10*6/mm3      Hemoglobin 10.1 g/dL      Hematocrit 30.4 %      MCV 79.8 fL      MCH 26.5 pg      MCHC 33.2 g/dL      RDW 14.8 %      RDW-SD 42.9 fl      MPV 9.1 fL      Platelets 493 10*3/mm3      Neutrophil % 77.4 %      Lymphocyte % 10.4 %      Monocyte % 9.0 %      Eosinophil % 2.5 %      Basophil % 0.2 %      Immature Grans % 0.5 %      Neutrophils, Absolute 12.88 10*3/mm3      Lymphocytes, Absolute 1.74 10*3/mm3      Monocytes, Absolute 1.50 10*3/mm3      Eosinophils, Absolute 0.42 10*3/mm3      Basophils, Absolute 0.04 10*3/mm3      Immature Grans, Absolute 0.09 10*3/mm3      nRBC 0.0 /100 WBC     Lavender Top [[id number]] Collected: 01/09/25 1622    Specimen: Blood Updated: 01/09/25 1631     Extra Tube hold for add-on     Comment: Auto resulted       Light Blue Top [[id number]] Collected: 01/09/25 1622    Specimen: Blood Updated: 01/09/25 1631     Extra Tube Hold for add-ons.     Comment: Auto resulted       Green Top (Gel) [[id number]] Collected: 01/09/25 1622    Specimen: Blood Updated: 01/09/25 1631     Extra Tube Hold for add-ons.     Comment: Auto resulted.       Gold Top - SST [[id number]] Collected: 01/09/25 1622    Specimen: Blood Updated: 01/09/25 1631     Extra Tube Hold for add-ons.     Comment: Auto resulted.             Imaging Results (All)       Procedure Component Value Units Date/Time    CT Chest Without Contrast Diagnostic [[id number]] Collected: 01/09/25 1746     Updated: 01/09/25 1810    Narrative:      CT CHEST WO CONTRAST DIAGNOSTIC    Date of Exam: 1/9/2025 5:35 PM EST    Indication: cough, wheezing, right sided pain with HX of MAC.    Comparison: 9/20/2022    Technique: Axial CT images were obtained of the chest without contrast administration.  Reconstructed coronal and sagittal images were also obtained. Automated exposure control and iterative  construction methods were used.      Findings:  MEDIASTINUM: Unremarkable. Aortic and heart size are normal. No mass nor pericardial effusion.  CORONARY ARTERIES: No calcified atherosclerotic disease.  LUNGS: There is inferior right upper lobe and right middle lobe airspace disease consistent multifocal pneumonia. This is superimposed on a background pattern of emphysema and partially calcified opacities in both lungs which are similar. Previous 12 mm   right lower lobe nodule now measures 8 mm. No new/suspicious nodule is identified.  PLEURAL SPACE: No effusion, mass, nor pneumothorax.  LYMPH NODES: There are no pathologically enlarged lymph nodes.    UPPER ABDOMEN: Unremarkable    OSSEOUS STRUCTURES: Appropriate for age with no acute process identified.          Impression:      Impression:  1.Right lung multifocal pneumonia  2.Decrease in size of right lower lobe pulmonary nodule. No new suspicious nodule identified.  3.Other chronic findings as discussed.        Electronically Signed: Gustavo Proctor MD    1/9/2025 6:07 PM EST    Workstation ID: [id number]    XR Chest 1 View [[id number]] Collected: 01/09/25 1710     Updated: 01/09/25 1717    Narrative:      XR CHEST 1 VW    Date of Exam: 1/9/2025 4:50 PM EST    Indication: Chest Pain Triage Protocol    Comparison: CT chest dated 9/20/2022    Findings:  The cardiomediastinal silhouette is within normal limits. Pulmonary vascularity appears normal. The lungs are hyperinflated with with underlying COPD/emphysema. There are interstitial opacities within the right lung likely representing infectious or   inflammatory process. There is biapical scarring. There is a bleb within the superior lateral aspect of the right upper lobe with surrounding thickened wall. This is new from prior exam. There is no pleural effusion.      Impression:      Impression:  1.Hyperinflated lungs with underlying COPD/emphysema.  2.Interstitial opacities within the right lung likely representing  infectious or inflammatory process.  3.New bleb within the superior lateral aspect of the right upper lobe with surrounding thickening of the wall.        Electronically Signed: Pito Mcdanielelor    1/9/2025 5:14 PM EST    Workstation ID: [id number]          Physician Progress Notes (all)    No notes of this type exist for this encounter.       Consult Notes (all)    No notes of this type exist for this encounter.

## 2025-01-10 NOTE — PLAN OF CARE
Goal Outcome Evaluation:  Plan of Care Reviewed With: patient           Outcome Evaluation: PT eval completed. Patient presenting with deficits in general BLE strength, standing dynamic balance, and functional endurance effecting functional mobility below baseline. Patient will benefit from skilled IP PT services to address impairments for return to PLOF. Recommendf home with assist and [facility].    Anticipated Discharge Disposition (PT): home with assist, home with home health

## 2025-01-10 NOTE — THERAPY EVALUATION
Patient Name: Kristen Jean  : 1960    MRN: [id number]                              Today's Date: 1/10/2025       Admit Date: 2025    Visit Dx:     ICD-10-CM ICD-9-CM   1. Multifocal pneumonia  J18.9 486   2. History of MAC infection  Z86.19 V12.09   3. Bandemia  D72.825 288.66   4. Hyponatremia  E87.1 276.1     Patient Active Problem List   Diagnosis    Chronic bronchitis    Uncomplicated asthma    Mycobacterium infection    Pneumonia    Emphysema of lung    Asthma-COPD overlap syndrome    Anxiety disorder    Multifocal pneumonia    Hyponatremia    Hypokalemia    Protein calorie malnutrition    Cachexia    End stage COPD    Hypothyroidism (acquired)    Mycobacterium avium complex    T2DM (type 2 diabetes mellitus)     Past Medical History:   Diagnosis Date    Breast injury      Past Surgical History:   Procedure Laterality Date    TOOTH EXTRACTION       and       General Information       Row Name 01/10/25 0715          OT Time and Intention    Document Type evaluation  -     Mode of Treatment occupational therapy  -       Row Name 01/10/25 0715          General Information    Patient Profile Reviewed yes  -AC     Prior Level of Function independent:;all household mobility;ADL's;driving  does not use AD to ambulate  -     Existing Precautions/Restrictions orthostatic hypotension;oxygen therapy device and L/min  monitor O2, BP  -     Barriers to Rehab medically complex  -       Row Name 01/10/25 0715          Occupational Profile    Environmental Supports and Barriers (Occupational Profile) tub shower with grab bars  -       Row Name 01/10/25 0715          Living Environment    People in Home alone  -       Row Name 01/10/25 0715          Home Main Entrance    Number of Stairs, Main Entrance none  -       Row Name 01/10/25 0715          Stairs Within Home, Primary    Number of Stairs, Within Home, Primary none  -AC       Row Name 01/10/25 0715          Cognition    Orientation  Status (Cognition) oriented x 4  -       Row Name 01/10/25 0715          Safety Issues/Impairments Affecting Functional Mobility    Safety Issues Affecting Function (Mobility) insight into deficits/self-awareness  -     Impairments Affecting Function (Mobility) endurance/activity tolerance;shortness of breath;pain  -               User Key  (r) = Recorded By, (t) = Taken By, (c) = Cosigned By      Initials Name Provider Type    AC Liyah Sharif, OT Occupational Therapist                     Mobility/ADL's       Row Name 01/10/25 0810          Bed Mobility    Bed Mobility supine-sit;sit-supine  -     Supine-Sit [place] (Bed Mobility) modified independence  -     Sit-Supine [place] (Bed Mobility) modified independence  -       Row Name 01/10/25 0810          Transfers    Transfers sit-stand transfer  -       Row Name 01/10/25 0810          Sit-Stand Transfer    Sit-Stand [place] (Transfers) standby assist  -       Row Name 01/10/25 0810          Functional Mobility    Functional Mobility- Ind. Level standby assist  -     Functional Mobility-Distance (Feet) 8  -     Functional Mobility- Comment O2 sat dropped to 86% and recovered to 93% with rest and PLB  -       Row Name 01/10/25 0810          Activities of Daily Living    BADL Assessment/Intervention lower body dressing  -       Row Name 01/10/25 0810          Lower Body Dressing Assessment/Training    [place] Level (Lower Body Dressing) don;doff;shoes/slippers;standby assist  -     Position (Lower Body Dressing) edge of bed sitting  -               User Key  (r) = Recorded By, (t) = Taken By, (c) = Cosigned By      Initials Name Provider Type    Liyah Patrick, OT Occupational Therapist                   Obj/Interventions       Row Name 01/10/25 0812          Sensory Assessment (Somatosensory)    Sensory Assessment (Somatosensory) UE sensation intact  -       Row Name 01/10/25 0812          Vision  Assessment/Intervention    Visual Impairment/Limitations corrective lenses for reading  -       Row Name 01/10/25 0812          Range of Motion Comprehensive    General Range of Motion bilateral upper extremity ROM WNL  -       Row Name 01/10/25 0812          Strength Comprehensive (MMT)    Comment, General Manual Muscle Testing (MMT) Assessment BUE grossly 4/5  -       Row Name 01/10/25 0812          Balance    Balance Assessment sitting static balance;sitting dynamic balance;standing static balance;standing dynamic balance  -AC     Static Sitting Balance independent  -AC     Dynamic Sitting Balance standby assist  -AC     Position, Sitting Balance unsupported  -AC     Static Standing Balance standby assist  -AC     Dynamic Standing Balance standby assist  -AC     Position/Device Used, Standing Balance unsupported  -AC               User Key  (r) = Recorded By, (t) = Taken By, (c) = Cosigned By      Initials Name Provider Type    AC Liyah Sharif, OT Occupational Therapist                   Goals/Plan       Row Name 01/10/25 0817          Transfer Goal 1 (OT)    Activity/Assistive Device (Transfer Goal 1, OT) bed-to-chair/chair-to-bed;toilet  -     [place] Level/Cues Needed (Transfer Goal 1, OT) supervision required  -AC     Time Frame (Transfer Goal 1, OT) long term goal (LTG);10 days  -AC     Progress/Outcome (Transfer Goal 1, OT) new goal;goal ongoing  -       Row Name 01/10/25 0817          Dressing Goal 1 (OT)    Progress/Outcome (Dressing Goal 1, OT) new goal;goal ongoing  -       Row Name 01/10/25 0817          Toileting Goal 1 (OT)    Activity/Device (Toileting Goal 1, OT) adjust/manage clothing;perform perineal hygiene  -     [place] Level/Cues Needed (Toileting Goal 1, OT) supervision required  -AC     Time Frame (Toileting Goal 1, OT) short term goal (STG);5 days  -       Row Name 01/10/25 0817          Problem Specific Goal 1 (OT)    Problem Specific Goal 1 (OT) pt will  complete 5 min BUE te/self care/mobility incorporating PLB/EC/WS with O 2 sat 90% or greater  -     Time Frame (Problem Specific Goal 1, OT) short term goal (STG);5 days  -       Row Name 01/10/25 0817          Therapy Assessment/Plan (OT)    Planned Therapy Interventions (OT) activity tolerance training;BADL retraining;functional balance retraining;occupation/activity based interventions;patient/caregiver education/training;strengthening exercise;transfer/mobility retraining  -               User Key  (r) = Recorded By, (t) = Taken By, (c) = Cosigned By      Initials Name Provider Type     Liyah Sharif, OT Occupational Therapist                   Clinical Impression       Row Name 01/10/25 0812          Pain Assessment    Pretreatment Pain Rating 8/10  -     Posttreatment Pain Rating 8/10  -     Pain Location flank  -     Pain Side/Orientation right  -     Pain Management Interventions breathing exercises utilized;activity modification encouraged;exercise or physical activity utilized  -     Response to Pain Interventions activity participation with tolerable pain;respiratory function improved  -       Row Name 01/10/25 0812          Plan of Care Review    Plan of Care Reviewed With patient  -     Outcome Evaluation Pt presents below baseline with self care d/t pain and decr activity tolerance.  Pt's O2 sat dropped to 86% with activity and recovered to 93% with rest and PLB.  Pt SBA  LBD, SBA to ambulate 8 ft without AD. OT will follow to advance pt toward PLOF.  Recommend home with HHOT pending progress.  -       Row Name 01/10/25 0812          Therapy Assessment/Plan (OT)    Criteria for Skilled Therapeutic Interventions Met (OT) yes;skilled treatment is necessary  -     Therapy Frequency (OT) daily  -       Row Name 01/10/25 0812          Therapy Plan Review/Discharge Plan (OT)    Anticipated Discharge Disposition (OT) home with home health  -       Row Name 01/10/25 0812           Vital Signs    Intra Systolic BP Rehab 80  -AC     Intra Treatment Diastolic BP 60  -AC     Post Systolic BP Rehab 100  -AC     Post Treatment Diastolic BP 52  -AC     Pre SpO2 (%) 91  -AC     O2 Delivery Pre Treatment supplemental O2  -AC     Intra SpO2 (%) 86  -AC     O2 Delivery Intra Treatment supplemental O2  -AC     Post SpO2 (%) 93  -AC     O2 Delivery Post Treatment supplemental O2  -AC     Pre Patient Position Sitting  -AC     Post Patient Position Supine  -AC       Row Name 01/10/25 0812          Positioning and Restraints    Pre-Treatment Position in bed  -AC     Post Treatment Position bed  -AC     In Bed notified nsg;fowlers;call light within reach;encouraged to call for assist;exit alarm on  -AC               User Key  (r) = Recorded By, (t) = Taken By, (c) = Cosigned By      Initials Name Provider Type    Liyah Patrick, OT Occupational Therapist                   Outcome Measures       Row Name 01/10/25 0820          How much help from another is currently needed...    Putting on and taking off regular lower body clothing? 3  -AC     Bathing (including washing, rinsing, and drying) 3  -AC     Toileting (which includes using toilet bed pan or urinal) 3  -AC     Putting on and taking off regular upper body clothing 4  -AC     Taking care of personal grooming (such as brushing teeth) 3  -AC     Eating meals 4  -AC     AM-PAC 6 Clicks Score (OT) 20  -AC       Row Name 01/10/25 0144          How much help from another person do you currently need...    Turning from your back to your side while in flat bed without using bedrails? 4  -CH     Moving from lying on back to sitting on the side of a flat bed without bedrails? 4  -CH     Moving to and from a bed to a chair (including a wheelchair)? 4  -CH     Standing up from a chair using your arms (e.g., wheelchair, bedside chair)? 4  -CH     Climbing 3-5 steps with a railing? 4  -CH     To walk in hospital room? 4  -CH     AM-PAC 6 Clicks Score (PT) 24  -CH        Row Name 01/10/25 0820          Functional Assessment    Outcome Measure Options AM-PAC 6 Clicks Daily Activity (OT)  -               User Key  (r) = Recorded By, (t) = Taken By, (c) = Cosigned By      Initials Name Provider Type     Liyah Sharif, OT Occupational Therapist    Nuria Velasco RN Registered Nurse                    Occupational Therapy Education       Title: PT OT SLP Therapies (In Progress)       Topic: Occupational Therapy (In Progress)       Point: ADL training (In Progress)       Description:   Instruct learner(s) on proper safety adaptation and remediation techniques during self care or transfers.   Instruct in proper use of assistive devices.                  Learning Progress Summary            Patient Acceptance, E, NR by  at 1/10/2025 0820    Comment: benefits of activity, PLB                      Point: Home exercise program (Not Started)       Description:   Instruct learner(s) on appropriate technique for monitoring, assisting and/or progressing therapeutic exercises/activities.                  Learner Progress:  Not documented in this visit.              Point: Precautions (Not Started)       Description:   Instruct learner(s) on prescribed precautions during self-care and functional transfers.                  Learner Progress:  Not documented in this visit.              Point: Body mechanics (Not Started)       Description:   Instruct learner(s) on proper positioning and spine alignment during self-care, functional mobility activities and/or exercises.                  Learner Progress:  Not documented in this visit.                              User Key       Initials Effective Dates Name Provider Type Discipline     02/03/23 -  Liyah Sharif OT Occupational Therapist OT                  OT Recommendation and Plan  Planned Therapy Interventions (OT): activity tolerance training, BADL retraining, functional balance retraining, occupation/activity based  interventions, patient/caregiver education/training, strengthening exercise, transfer/mobility retraining  Therapy Frequency (OT): daily  Plan of Care Review  Plan of Care Reviewed With: patient  Outcome Evaluation: Pt presents below baseline with self care d/t pain and decr activity tolerance.  Pt's O2 sat dropped to 86% with activity and recovered to 93% with rest and PLB.  Pt SBA  LBD, SBA to ambulate 8 ft without AD. OT will follow to advance pt toward PLOF.  Recommend home with HHOT pending progress.     Time Calculation:   Evaluation Complexity (OT)  Review Occupational Profile/Medical/Therapy History Complexity: brief/low complexity  Assessment, Occupational Performance/Identification of Deficit Complexity: 1-3 performance deficits  Clinical Decision Making Complexity (OT): problem focused assessment/low complexity  Overall Complexity of Evaluation (OT): low complexity     Time Calculation- OT       Row Name 01/10/25 0715             Time Calculation- OT    OT Start Time 0715  -AC      OT Received On 01/10/25  -AC      OT Goal Re-Cert Due Date 01/20/25  -AC         Untimed Charges    OT Eval/Re-eval Minutes 46  -AC         Total Minutes    Untimed Charges Total Minutes 46  -AC       Total Minutes 46  -AC                User Key  (r) = Recorded By, (t) = Taken By, (c) = Cosigned By      Initials Name Provider Type    AC Liyah Sharif, OT Occupational Therapist                  Therapy Charges for Today       Code Description Service Date Service Provider Modifiers Qty    [id number]  OT EVAL LOW COMPLEXITY 4 1/10/2025 Liyah Sharif, OT GO 1                 Liyah Sharif OT  1/10/2025

## 2025-01-11 PROBLEM — J18.9 PNEUMONIA: Status: RESOLVED | Noted: 2025-01-09 | Resolved: 2025-01-11

## 2025-01-11 PROBLEM — E11.9 T2DM (TYPE 2 DIABETES MELLITUS): Status: RESOLVED | Noted: 2025-01-09 | Resolved: 2025-01-11

## 2025-01-11 PROBLEM — E43 SEVERE PROTEIN-CALORIE MALNUTRITION: Status: ACTIVE | Noted: 2025-01-11

## 2025-01-11 PROBLEM — I70.0 AORTIC ATHEROSCLEROSIS: Status: ACTIVE | Noted: 2025-01-11

## 2025-01-11 PROBLEM — I50.32 HEART FAILURE WITH IMPROVED EJECTION FRACTION (HFIMPEF): Status: ACTIVE | Noted: 2025-01-11

## 2025-01-11 PROBLEM — I48.91 NEW ONSET A-FIB: Status: ACTIVE | Noted: 2025-01-11

## 2025-01-11 LAB
ALBUMIN SERPL-MCNC: 3 G/DL (ref 3.5–5.2)
ALBUMIN/GLOB SERPL: 1 G/DL
ALP SERPL-CCNC: 434 U/L (ref 39–117)
ALT SERPL W P-5'-P-CCNC: 51 U/L (ref 1–33)
ANION GAP SERPL CALCULATED.3IONS-SCNC: 10 MMOL/L (ref 5–15)
ANION GAP SERPL CALCULATED.3IONS-SCNC: 9 MMOL/L (ref 5–15)
AST SERPL-CCNC: 101 U/L (ref 1–32)
BASOPHILS # BLD AUTO: 0.06 10*3/MM3 (ref 0–0.2)
BASOPHILS NFR BLD AUTO: 0.4 % (ref 0–1.5)
BILIRUB SERPL-MCNC: <0.2 MG/DL (ref 0–1.2)
BUN SERPL-MCNC: 3 MG/DL (ref 8–23)
BUN SERPL-MCNC: 3 MG/DL (ref 8–23)
BUN/CREAT SERPL: 11.5 (ref 7–25)
BUN/CREAT SERPL: 12.5 (ref 7–25)
CALCIUM SPEC-SCNC: 8.6 MG/DL (ref 8.6–10.5)
CALCIUM SPEC-SCNC: 8.8 MG/DL (ref 8.6–10.5)
CHLORIDE SERPL-SCNC: 93 MMOL/L (ref 98–107)
CHLORIDE SERPL-SCNC: 97 MMOL/L (ref 98–107)
CO2 SERPL-SCNC: 25 MMOL/L (ref 22–29)
CO2 SERPL-SCNC: 27 MMOL/L (ref 22–29)
CORTIS SERPL-MCNC: 14.94 MCG/DL
CREAT SERPL-MCNC: 0.24 MG/DL (ref 0.57–1)
CREAT SERPL-MCNC: 0.26 MG/DL (ref 0.57–1)
DEPRECATED RDW RBC AUTO: 43.4 FL (ref 37–54)
DEPRECATED RDW RBC AUTO: 45.7 FL (ref 37–54)
EGFRCR SERPLBLD CKD-EPI 2021: 123.6 ML/MIN/1.73
EGFRCR SERPLBLD CKD-EPI 2021: 126 ML/MIN/1.73
EOSINOPHIL # BLD AUTO: 0.99 10*3/MM3 (ref 0–0.4)
EOSINOPHIL NFR BLD AUTO: 7.1 % (ref 0.3–6.2)
ERYTHROCYTE [DISTWIDTH] IN BLOOD BY AUTOMATED COUNT: 14.6 % (ref 12.3–15.4)
ERYTHROCYTE [DISTWIDTH] IN BLOOD BY AUTOMATED COUNT: 15.3 % (ref 12.3–15.4)
GEN 5 1HR TROPONIN T REFLEX: 14 NG/L
GLOBULIN UR ELPH-MCNC: 2.9 GM/DL
GLUCOSE SERPL-MCNC: 115 MG/DL (ref 65–99)
GLUCOSE SERPL-MCNC: 116 MG/DL (ref 65–99)
HCT VFR BLD AUTO: 29.4 % (ref 34–46.6)
HCT VFR BLD AUTO: 32.1 % (ref 34–46.6)
HGB BLD-MCNC: 10.4 G/DL (ref 12–15.9)
HGB BLD-MCNC: 9.6 G/DL (ref 12–15.9)
IMM GRANULOCYTES # BLD AUTO: 0.15 10*3/MM3 (ref 0–0.05)
IMM GRANULOCYTES NFR BLD AUTO: 1.1 % (ref 0–0.5)
LYMPHOCYTES # BLD AUTO: 1.6 10*3/MM3 (ref 0.7–3.1)
LYMPHOCYTES NFR BLD AUTO: 11.5 % (ref 19.6–45.3)
MAGNESIUM SERPL-MCNC: 1.8 MG/DL (ref 1.6–2.4)
MAGNESIUM SERPL-MCNC: 1.8 MG/DL (ref 1.6–2.4)
MCH RBC QN AUTO: 26.5 PG (ref 26.6–33)
MCH RBC QN AUTO: 26.7 PG (ref 26.6–33)
MCHC RBC AUTO-ENTMCNC: 32.4 G/DL (ref 31.5–35.7)
MCHC RBC AUTO-ENTMCNC: 32.7 G/DL (ref 31.5–35.7)
MCV RBC AUTO: 81.2 FL (ref 79–97)
MCV RBC AUTO: 82.5 FL (ref 79–97)
MONOCYTES # BLD AUTO: 1.41 10*3/MM3 (ref 0.1–0.9)
MONOCYTES NFR BLD AUTO: 10.2 % (ref 5–12)
NEUTROPHILS NFR BLD AUTO: 69.7 % (ref 42.7–76)
NEUTROPHILS NFR BLD AUTO: 9.68 10*3/MM3 (ref 1.7–7)
NRBC BLD AUTO-RTO: 0 /100 WBC (ref 0–0.2)
PHOSPHATE SERPL-MCNC: 1.8 MG/DL (ref 2.5–4.5)
PLATELET # BLD AUTO: 509 10*3/MM3 (ref 140–450)
PLATELET # BLD AUTO: 687 10*3/MM3 (ref 140–450)
PMV BLD AUTO: 8.8 FL (ref 6–12)
PMV BLD AUTO: 9.8 FL (ref 6–12)
POTASSIUM SERPL-SCNC: 3.1 MMOL/L (ref 3.5–5.2)
POTASSIUM SERPL-SCNC: 4.6 MMOL/L (ref 3.5–5.2)
POTASSIUM SERPL-SCNC: 4.6 MMOL/L (ref 3.5–5.2)
PROT SERPL-MCNC: 5.9 G/DL (ref 6–8.5)
QT INTERVAL: 244 MS
QT INTERVAL: 262 MS
QTC INTERVAL: 356 MS
QTC INTERVAL: 403 MS
RBC # BLD AUTO: 3.62 10*6/MM3 (ref 3.77–5.28)
RBC # BLD AUTO: 3.89 10*6/MM3 (ref 3.77–5.28)
SODIUM SERPL-SCNC: 129 MMOL/L (ref 136–145)
SODIUM SERPL-SCNC: 132 MMOL/L (ref 136–145)
TROPONIN T NUMERIC DELTA: 2 NG/L
TROPONIN T SERPL HS-MCNC: 12 NG/L
WBC NRBC COR # BLD AUTO: 13.89 10*3/MM3 (ref 3.4–10.8)
WBC NRBC COR # BLD AUTO: 15.32 10*3/MM3 (ref 3.4–10.8)

## 2025-01-11 PROCEDURE — 84100 ASSAY OF PHOSPHORUS: CPT | Performed by: INTERNAL MEDICINE

## 2025-01-11 PROCEDURE — 25010000002 POTASSIUM CHLORIDE 10 MEQ/100ML SOLUTION: Performed by: INTERNAL MEDICINE

## 2025-01-11 PROCEDURE — 82533 TOTAL CORTISOL: CPT

## 2025-01-11 PROCEDURE — 84132 ASSAY OF SERUM POTASSIUM: CPT | Performed by: INTERNAL MEDICINE

## 2025-01-11 PROCEDURE — 93005 ELECTROCARDIOGRAM TRACING: CPT | Performed by: PHYSICIAN ASSISTANT

## 2025-01-11 PROCEDURE — 84484 ASSAY OF TROPONIN QUANT: CPT | Performed by: STUDENT IN AN ORGANIZED HEALTH CARE EDUCATION/TRAINING PROGRAM

## 2025-01-11 PROCEDURE — 25010000002 CEFTRIAXONE PER 250 MG: Performed by: PHYSICIAN ASSISTANT

## 2025-01-11 PROCEDURE — 63710000001 PREDNISONE PER 1 MG: Performed by: INTERNAL MEDICINE

## 2025-01-11 PROCEDURE — 85027 COMPLETE CBC AUTOMATED: CPT | Performed by: INTERNAL MEDICINE

## 2025-01-11 PROCEDURE — 25810000003 SODIUM CHLORIDE 0.9 % SOLUTION: Performed by: INTERNAL MEDICINE

## 2025-01-11 PROCEDURE — 94799 UNLISTED PULMONARY SVC/PX: CPT

## 2025-01-11 PROCEDURE — 25810000003 SODIUM CHLORIDE 0.9 % SOLUTION: Performed by: STUDENT IN AN ORGANIZED HEALTH CARE EDUCATION/TRAINING PROGRAM

## 2025-01-11 PROCEDURE — 83735 ASSAY OF MAGNESIUM: CPT | Performed by: INTERNAL MEDICINE

## 2025-01-11 PROCEDURE — 93010 ELECTROCARDIOGRAM REPORT: CPT | Performed by: INTERNAL MEDICINE

## 2025-01-11 PROCEDURE — 99222 1ST HOSP IP/OBS MODERATE 55: CPT | Performed by: INTERNAL MEDICINE

## 2025-01-11 PROCEDURE — 25010000002 ENOXAPARIN PER 10 MG

## 2025-01-11 PROCEDURE — 93005 ELECTROCARDIOGRAM TRACING: CPT | Performed by: INTERNAL MEDICINE

## 2025-01-11 PROCEDURE — 25010000002 CALCIUM GLUCONATE 2-0.675 GM/100ML-% SOLUTION: Performed by: STUDENT IN AN ORGANIZED HEALTH CARE EDUCATION/TRAINING PROGRAM

## 2025-01-11 PROCEDURE — 25010000002 DIGOXIN PER 500 MCG: Performed by: INTERNAL MEDICINE

## 2025-01-11 PROCEDURE — 99232 SBSQ HOSP IP/OBS MODERATE 35: CPT | Performed by: INTERNAL MEDICINE

## 2025-01-11 PROCEDURE — 25010000002 ADENOSINE PER 6 MG

## 2025-01-11 PROCEDURE — 85025 COMPLETE CBC W/AUTO DIFF WBC: CPT | Performed by: INTERNAL MEDICINE

## 2025-01-11 PROCEDURE — 94761 N-INVAS EAR/PLS OXIMETRY MLT: CPT

## 2025-01-11 PROCEDURE — 25010000002 MAGNESIUM SULFATE IN D5W 1G/100ML (PREMIX) 1-5 GM/100ML-% SOLUTION: Performed by: STUDENT IN AN ORGANIZED HEALTH CARE EDUCATION/TRAINING PROGRAM

## 2025-01-11 PROCEDURE — 80053 COMPREHEN METABOLIC PANEL: CPT | Performed by: INTERNAL MEDICINE

## 2025-01-11 RX ORDER — CALCIUM GLUCONATE 20 MG/ML
2000 INJECTION, SOLUTION INTRAVENOUS ONCE
Status: COMPLETED | OUTPATIENT
Start: 2025-01-11 | End: 2025-01-11

## 2025-01-11 RX ORDER — HYDROCODONE POLISTIREX AND CHLORPHENIRAMINE POLISTIREX 10; 8 MG/5ML; MG/5ML
2.5 SUSPENSION, EXTENDED RELEASE ORAL EVERY 12 HOURS PRN
Status: DISPENSED | OUTPATIENT
Start: 2025-01-11 | End: 2025-01-16

## 2025-01-11 RX ORDER — DIGOXIN 0.25 MG/ML
500 INJECTION INTRAMUSCULAR; INTRAVENOUS ONCE
Status: COMPLETED | OUTPATIENT
Start: 2025-01-11 | End: 2025-01-11

## 2025-01-11 RX ORDER — METOPROLOL TARTRATE 1 MG/ML
2.5 INJECTION, SOLUTION INTRAVENOUS ONCE
Status: COMPLETED | OUTPATIENT
Start: 2025-01-11 | End: 2025-01-11

## 2025-01-11 RX ORDER — LORAZEPAM 0.5 MG/1
0.25 TABLET ORAL 2 TIMES DAILY PRN
Status: DISCONTINUED | OUTPATIENT
Start: 2025-01-11 | End: 2025-01-12

## 2025-01-11 RX ORDER — ADENOSINE 3 MG/ML
INJECTION, SOLUTION INTRAVENOUS
Status: COMPLETED
Start: 2025-01-11 | End: 2025-01-11

## 2025-01-11 RX ORDER — DILTIAZEM HCL/D5W 125 MG/125
5-15 PLASTIC BAG, INJECTION (ML) INTRAVENOUS CONTINUOUS
Status: DISCONTINUED | OUTPATIENT
Start: 2025-01-11 | End: 2025-01-11

## 2025-01-11 RX ORDER — POTASSIUM CHLORIDE 7.45 MG/ML
10 INJECTION INTRAVENOUS
Status: DISCONTINUED | OUTPATIENT
Start: 2025-01-11 | End: 2025-01-11

## 2025-01-11 RX ORDER — POTASSIUM CHLORIDE 1.5 G/1.58G
40 POWDER, FOR SOLUTION ORAL EVERY 4 HOURS
Status: COMPLETED | OUTPATIENT
Start: 2025-01-11 | End: 2025-01-11

## 2025-01-11 RX ORDER — MINOCYCLINE HYDROCHLORIDE 50 MG/1
100 CAPSULE ORAL EVERY 12 HOURS SCHEDULED
Status: DISCONTINUED | OUTPATIENT
Start: 2025-01-11 | End: 2025-01-20 | Stop reason: HOSPADM

## 2025-01-11 RX ORDER — METOPROLOL TARTRATE 25 MG/1
25 TABLET, FILM COATED ORAL EVERY 12 HOURS SCHEDULED
Status: DISCONTINUED | OUTPATIENT
Start: 2025-01-11 | End: 2025-01-20 | Stop reason: HOSPADM

## 2025-01-11 RX ORDER — METOPROLOL TARTRATE 25 MG/1
25 TABLET, FILM COATED ORAL EVERY 12 HOURS SCHEDULED
Status: DISCONTINUED | OUTPATIENT
Start: 2025-01-11 | End: 2025-01-11

## 2025-01-11 RX ORDER — DIGOXIN 0.25 MG/ML
250 INJECTION INTRAMUSCULAR; INTRAVENOUS EVERY 6 HOURS
Status: COMPLETED | OUTPATIENT
Start: 2025-01-12 | End: 2025-01-12

## 2025-01-11 RX ORDER — DEXTROMETHORPHAN POLISTIREX 30 MG/5ML
60 SUSPENSION ORAL EVERY 12 HOURS PRN
Status: DISCONTINUED | OUTPATIENT
Start: 2025-01-11 | End: 2025-01-11

## 2025-01-11 RX ORDER — DILTIAZEM HCL 60 MG
60 TABLET ORAL EVERY 8 HOURS SCHEDULED
Status: DISCONTINUED | OUTPATIENT
Start: 2025-01-12 | End: 2025-01-13

## 2025-01-11 RX ORDER — LORAZEPAM 0.5 MG/1
0.25 TABLET ORAL ONCE
Status: COMPLETED | OUTPATIENT
Start: 2025-01-11 | End: 2025-01-11

## 2025-01-11 RX ORDER — MAGNESIUM SULFATE 1 G/100ML
1 INJECTION INTRAVENOUS ONCE
Status: COMPLETED | OUTPATIENT
Start: 2025-01-11 | End: 2025-01-11

## 2025-01-11 RX ORDER — ADENOSINE 3 MG/ML
12 INJECTION, SOLUTION INTRAVENOUS ONCE
Status: COMPLETED | OUTPATIENT
Start: 2025-01-11 | End: 2025-01-11

## 2025-01-11 RX ORDER — DILTIAZEM HCL/D5W 125 MG/125
5-15 PLASTIC BAG, INJECTION (ML) INTRAVENOUS CONTINUOUS
Status: DISCONTINUED | OUTPATIENT
Start: 2025-01-11 | End: 2025-01-12

## 2025-01-11 RX ADMIN — ADENOSINE 12 MG: 3 INJECTION INTRAVENOUS at 19:31

## 2025-01-11 RX ADMIN — IPRATROPIUM BROMIDE AND ALBUTEROL SULFATE 3 ML: 2.5; .5 SOLUTION RESPIRATORY (INHALATION) at 12:00

## 2025-01-11 RX ADMIN — ALBUTEROL SULFATE 2.5 MG: 2.5 SOLUTION RESPIRATORY (INHALATION) at 05:37

## 2025-01-11 RX ADMIN — BENZONATATE 100 MG: 100 CAPSULE ORAL at 08:39

## 2025-01-11 RX ADMIN — MINOCYCLINE HYDROCHLORIDE 100 MG: 50 CAPSULE ORAL at 20:47

## 2025-01-11 RX ADMIN — APIXABAN 5 MG: 5 TABLET, FILM COATED ORAL at 20:48

## 2025-01-11 RX ADMIN — PANTOPRAZOLE SODIUM 40 MG: 40 TABLET, DELAYED RELEASE ORAL at 20:45

## 2025-01-11 RX ADMIN — IPRATROPIUM BROMIDE AND ALBUTEROL SULFATE 3 ML: 2.5; .5 SOLUTION RESPIRATORY (INHALATION) at 16:20

## 2025-01-11 RX ADMIN — POTASSIUM CHLORIDE 40 MEQ: 1.5 POWDER, FOR SOLUTION ORAL at 11:38

## 2025-01-11 RX ADMIN — POTASSIUM CHLORIDE 40 MEQ: 1.5 POWDER, FOR SOLUTION ORAL at 08:39

## 2025-01-11 RX ADMIN — ADENOSINE 12 MG: 3 INJECTION, SOLUTION INTRAVENOUS at 19:31

## 2025-01-11 RX ADMIN — Medication 10 ML: at 08:40

## 2025-01-11 RX ADMIN — SODIUM CHLORIDE 1000 ML: 9 INJECTION, SOLUTION INTRAVENOUS at 13:22

## 2025-01-11 RX ADMIN — PREDNISONE 20 MG: 20 TABLET ORAL at 07:25

## 2025-01-11 RX ADMIN — Medication 1 LOZENGE: at 17:54

## 2025-01-11 RX ADMIN — DIGOXIN 500 MCG: 0.25 INJECTION INTRAMUSCULAR; INTRAVENOUS at 18:24

## 2025-01-11 RX ADMIN — METOPROLOL TARTRATE 2.5 MG: 5 INJECTION INTRAVENOUS at 01:58

## 2025-01-11 RX ADMIN — IPRATROPIUM BROMIDE AND ALBUTEROL SULFATE 3 ML: 2.5; .5 SOLUTION RESPIRATORY (INHALATION) at 08:48

## 2025-01-11 RX ADMIN — METOPROLOL TARTRATE 25 MG: 25 TABLET, FILM COATED ORAL at 20:48

## 2025-01-11 RX ADMIN — Medication 5 MG/HR: at 08:44

## 2025-01-11 RX ADMIN — Medication 10 ML: at 22:37

## 2025-01-11 RX ADMIN — SODIUM CHLORIDE 250 ML: 9 INJECTION, SOLUTION INTRAVENOUS at 17:53

## 2025-01-11 RX ADMIN — LORAZEPAM 0.5 MG: 0.5 TABLET ORAL at 08:39

## 2025-01-11 RX ADMIN — MINOCYCLINE HYDROCHLORIDE 100 MG: 50 CAPSULE ORAL at 11:38

## 2025-01-11 RX ADMIN — DILTIAZEM HYDROCHLORIDE 60 MG: 60 TABLET, FILM COATED ORAL at 23:47

## 2025-01-11 RX ADMIN — POTASSIUM CHLORIDE 40 MEQ: 1.5 POWDER, FOR SOLUTION ORAL at 17:45

## 2025-01-11 RX ADMIN — IPRATROPIUM BROMIDE AND ALBUTEROL SULFATE 3 ML: 2.5; .5 SOLUTION RESPIRATORY (INHALATION) at 19:48

## 2025-01-11 RX ADMIN — LORAZEPAM 0.25 MG: 0.5 TABLET ORAL at 19:21

## 2025-01-11 RX ADMIN — HYDROCODONE POLISTIREX AND CHLORPHENIRAMINE POLISTIREX 2.5 ML: 10; 8 SUSPENSION, EXTENDED RELEASE ORAL at 02:12

## 2025-01-11 RX ADMIN — Medication 5 MG/HR: at 20:37

## 2025-01-11 RX ADMIN — CALCIUM GLUCONATE 2000 MG: 20 INJECTION, SOLUTION INTRAVENOUS at 20:37

## 2025-01-11 RX ADMIN — SODIUM CHLORIDE 2000 MG: 900 INJECTION INTRAVENOUS at 17:45

## 2025-01-11 RX ADMIN — SODIUM CHLORIDE 500 ML: 9 INJECTION, SOLUTION INTRAVENOUS at 20:10

## 2025-01-11 RX ADMIN — POTASSIUM CHLORIDE 10 MEQ: 7.45 INJECTION INTRAVENOUS at 07:25

## 2025-01-11 RX ADMIN — MAGNESIUM SULFATE HEPTAHYDRATE 1 G: 10 INJECTION, SOLUTION INTRAVENOUS at 20:07

## 2025-01-11 NOTE — NURSING NOTE
Patient is argumentive and non-complaint with treatment plan. Patient is still refusing blood thinner(Lovenox) after educating patient on importance of due to new cardiac rhythm change from NSR to A-Fib to now A-Flutter. Informed Dr Stewart of changes in rhythm and patient's attitude and treatment and the lack of.

## 2025-01-11 NOTE — CONSULTS
Inpatient Cardiology Consult  Consult performed by: Wayne Salcedo IV, MD  Consult ordered by: Franchesca Stewart MD  Reason for consult: Atrial fibrillation            Cardiology Consult       IDENTIFICATION: 64-year-old female who resides in [facility]    Diagnosis  POA    **Multifocal pneumonia [J18.9]  Yes     Priority: High    Atrial fibrillation/flutter [I48.91]  Yes     Priority: Medium     First time diagnosis in the setting of multifocal pneumonia, 1/11/2025  [id number] 3 (female, history of CHF, aortic athero)      Heart failure with improved ejection fraction (HFimpEF) [I50.32]  Yes     Priority: Medium     Echo at  (1/18/2024): LVEF 38% with wall motion abnormality consistent with Takotsubo (stress) cardiomyopathy  Reported normalization of LV systolic function on echo performed by Miquel Post at [facility]      Severe protein-calorie malnutrition [E43]  Yes    Emphysema of lung [J43.9]  Yes    Asthma-COPD overlap syndrome [J44.89]  Yes    Anxiety disorder [F41.9]  Yes    Hyponatremia [E87.1]  Yes    Hypokalemia [E87.6]  Yes    Protein calorie malnutrition [E46]  Yes    Cachexia [R64]  Yes    End stage COPD [J44.9]  Yes    Hypothyroidism (acquired) [E03.9]  Yes    Mycobacterium avium complex [A31.0]  Yes    T2DM (type 2 diabetes mellitus) [E11.9]  Yes    Chronic bronchitis [J42]  Yes    Mycobacterium infection [A31.9]  Yes              64-year-old female with end-stage COPD admitted for multifocal pneumonia.  She has been followed at  for end-stage lung disease and has history of MAC.  Admitted 1/9/2025 with worsening shortness of breath and increasing oxygen requirement.  CT chest showed right lung multifocal pneumonia.  Admitted to hospitalist service for treatment of pneumonia.    This morning, patient developed tachycardia and was found to have atrial fibrillation with RVR.  She is asymptomatic.  She denies chest pain symptoms.  She  denies being previously diagnosed with cardiac arrhythmia.  She follows with Miquel Post for history of stress-induced cardiomyopathy.  She was hospitalized at  with pneumonia 1 year ago and EF was noted to be 38% with Takotsubo appearing wall motion abnormality.  She evidently underwent subsequent echocardiogram in Dr. Post's office which showed normalized LV function.    Allergies   Allergen Reactions    Codeine     Fluticasone-Salmeterol     Penicillins        Prior to Admission medications    Medication Sig Start Date End Date Taking? Authorizing Provider   azithromycin (ZITHROMAX) 500 MG tablet Take 1 tablet by mouth Daily. For 30 days, ongoing therapy- last refill 12-14-24   Yes Leslie Lopez MD   Fluticasone Furoate-Vilanterol (BREO ELLIPTA) 200-25 MCG/ACT inhaler Inhale 1 puff Daily.   Yes Leslie Lopez MD   ipratropium-albuterol (DUO-NEB) 0.5-2.5 mg/3 ml nebulizer Take 3 mL by nebulization 4 (Four) Times a Day As Needed for Wheezing or Shortness of Air.   Yes Leslie Lopez MD   LORazepam (ATIVAN) 0.5 MG tablet Take 1 tablet by mouth 2 (Two) Times a Day As Needed for Anxiety.   Yes Leslie Lopez MD   pantoprazole (PROTONIX) 40 MG EC tablet Take 1 tablet by mouth Every Night.   Yes Leslie Lopez MD   rifAMPin (RIFADIN) 300 MG capsule Take 1 capsule by mouth Every Other Day.   Yes Leslie Lopez MD       Past Medical History:   Diagnosis Date    Anxiety disorder 01/09/2025    Asthma-COPD overlap syndrome 01/09/2025    Breast injury     Cachexia 01/09/2025    Chronic bronchitis 08/11/2016    Emphysema of lung 01/09/2025    Hypothyroidism (acquired) 01/09/2025    Mycobacterium infection 08/11/2016    T2DM (type 2 diabetes mellitus) 01/09/2025       Past Surgical History:   Procedure Laterality Date    TOOTH EXTRACTION      1990 and 2002       Family History   Problem Relation Age of Onset    Dementia Father     Breast cancer Neg Hx     Ovarian cancer Neg Hx         Social History     Tobacco Use   Smoking Status Every Day   Smokeless Tobacco Not on file       Social History     Substance and Sexual Activity   Alcohol Use No         Review of Systems:   ROS         Vital Sign Min/Max for last 24 hours  Temp  Min: 97.1 °F (36.2 °C)  Max: 99.1 °F (37.3 °C)   BP  Min: 88/34  Max: 127/64   Pulse  Min: 86  Max: 171   Resp  Min: 16  Max: 22   SpO2  Min: 78 %  Max: 95 %   Flow (L/min) (Oxygen Therapy)  Min: 3  Max: 3      Intake/Output Summary (Last 24 hours) at 1/11/2025 1645  Last data filed at 1/11/2025 1640  Gross per 24 hour   Intake 600 ml   Output 2850 ml   Net -2250 ml           Tele: Atrial fibrillation at 100 bpm    Constitutional:       Appearance: Healthy appearance. Cachectic.   Eyes:      General: No scleral icterus.  Neck:      Thyroid: No thyroid mass.      Vascular: No carotid bruit or JVD. JVD normal.   Pulmonary:      Effort: Pulmonary effort is normal.      Breath sounds: Normal breath sounds.   Cardiovascular:      Normal rate. Irregularly irregular rhythm.      Murmurs: There is no murmur.      No gallop.    Edema:     Peripheral edema absent.   Skin:     General: Skin is warm. There is no cyanosis.   Neurological:      General: No focal deficit present.      Mental Status: Alert.   Psychiatric:         Attention and Perception: Attention normal.              DATA REVIEW:    EKG (1/11/2025): Atrial fibrillation 142 bpm    Echo at  (1/18/2024): LVEF 38% with wall motion abnormality consistent with Takotsubo (stress) cardiomyopathy.      Results from last 7 days   Lab Units 01/11/25  0408 01/09/25  1622   SODIUM mmol/L 129* 127*   POTASSIUM mmol/L 3.1* 3.4*   CHLORIDE mmol/L 93* 90*   BUN mg/dL 3* 5*   CREATININE mg/dL 0.24* 0.31*   MAGNESIUM mg/dL 1.8  --      Results from last 7 days   Lab Units 01/09/25  1807 01/09/25  1622   HSTROP T ng/L 12 11     Results from last 7 days   Lab Units 01/11/25  0408 01/09/25  1622   WBC 10*3/mm3 13.89* 16.67*    HEMOGLOBIN g/dL 9.6* 10.1*   HEMATOCRIT % 29.4* 30.4*   PLATELETS 10*3/mm3 509* 493*     Lab Results   Component Value Date    HGBA1C 6.00 (H) 01/09/2025     Lab Results   Component Value Date    CHOL 144 01/09/2025    TRIG 74 01/09/2025    HDL 41 01/09/2025    LDL 88 01/09/2025    AST 29 01/09/2025    ALT 21 01/09/2025     Lab Results   Component Value Date    TROPONINT 12 01/09/2025    TROPONINT 11 01/09/2025    TROPONINT 411 (H) 01/17/2024         Lab 01/09/25  1622   PROBNP 264.7          Atrial fibrillation/flutter  First time diagnosis in the setting of pneumonia  Start apixaban 5 mg twice daily  Replete potassium >4 and magnesium >2  Will load with digoxin 500 mcg IV x 1, followed by 250 mcg IV every 6 hours x 2 doses  Start metoprolol tartrate 12.5 mg twice daily.  Hold for SBP <100 mmHg    History of stress-induced cardiomyopathy  History of LVEF 38% in the setting of pneumonia 1/2024  Reported normalization of LV systolic function on subsequent echo at [facility]  Repeat echo  proBNP and chest CT do not suggest heart failure observation             Start apixaban 5 mg twice daily.  Discontinue aspirin  Replete K and mag  Load with IV digoxin  Start metoprolol tartrate 12.5 mg twice daily  Echocardiogram to reassess LVEF  Defer cardioversion while being treated for pneumonia.    Patient to follow-up with Miquel Post in [place] after discharge.      Electronically signed by Wayne Salcedo IV, MD, 01/11/25, 4:15 PM EST.

## 2025-01-11 NOTE — PROGRESS NOTES
INFECTIOUS DISEASE Progress Note    Kristen Jean  1960  [id number]    Admission Date: 1/9/2025      Requesting Provider: Rosi Medina MD  Evaluating Physician: Az Wasserman MD    Reason for Consultation: Multifocal pneumonia with h/o MAC on chronic therapy    History of present illness:     1/10/2025:Patient is a 64 y.o. female with pulmonary Mycobacterium avium infection 2015/on chronic therapy, ES COPD/3L O2 baseline, and protein calorie malnutrition who presented to [facility] ED on 1/9/25 with worsening shortness of breath with right pleuritic chest pain, and higher oxygen requirements over the last week prior to admission. About 2 days ago, the patient bent over and she felt like she tore something in her right lower chest.  She quit smoking 11/2024, but all her neighbors smoke in the apartment complex.  The past week, she has needed 5-6 L/min O2 and is still satting at 93% on this oxygen setting.  She follow pulmonology and ID at [facility].  She has been noncompliant with her antibiotics for BONI and rotating taking Levaquin, Azithromycin and Rifampin because of complaints of side effects.   She denies fever, chills, nausea, vomiting, diarrhea, or dysuria.  She has remained afebrile with initial tachycardia. Initial labs were , PCT 0.09, WBC 16,700 with 77% neutrophils, Na 127, K 3.4, creatinine 0.31, CRP 13.13, and CPK 52.  A respiratory panel PCR was negative.  Urinary antigens for Strep pneumo and Legionella were negative. Nasal MRSA PCR is negative. A CT scan of chest without contrast showed right lung multifocal pneumonia, decrease in size of RLL pulmonary nodule.  She is currently on Merrem and Zyvox.  ID was asked to evaluate and manage her antibiotic therapy.    She has been noncompliant with follow-up.  Her last ID visit at  was in 5/24.  She has been noncompliant with her azithromycin, rifampin, and Levaquin as she takes these medications intermittently.  She states that she  has not received an RSV vaccine, Prevnar 20 pneumococcal vaccine, influenza vaccine, or COVID-19 vaccine booster     1/11/2025:  She has remained afebrile.  White blood cell count is 13.9.   MRSA nasal PCR was negative.  Urinary Legionella and pneumococcal antigens were negative.  O2 saturation is 90% on 3 L.    He complains of pleuritic right chest pain.  She complains of dyspnea.  She has minimal sputum production.   she refused taking the doxycycline due to prior nausea on doxycycline.  She is agreeable to trying minocycline.  She will need to take the minocycline with food but not any calcium containing food within 1 to 2 hours of the capsules.  I discussed this issue with her today in detail    Past Medical History:   Diagnosis Date    Anxiety disorder 01/09/2025    Asthma-COPD overlap syndrome 01/09/2025    Breast injury     Cachexia 01/09/2025    Chronic bronchitis 08/11/2016    Emphysema of lung 01/09/2025    Hypothyroidism (acquired) 01/09/2025    Mycobacterium infection 08/11/2016    T2DM (type 2 diabetes mellitus) 01/09/2025       Past Surgical History:   Procedure Laterality Date    TOOTH EXTRACTION      1990 and 2002       Family History   Problem Relation Age of Onset    Dementia Father     Breast cancer Neg Hx     Ovarian cancer Neg Hx        Social History     Socioeconomic History    Marital status: Single   Tobacco Use    Smoking status: Every Day   Vaping Use    Vaping status: Never Used   Substance and Sexual Activity    Alcohol use: No    Drug use: Never    Sexual activity: Defer       Allergies   Allergen Reactions    Codeine     Fluticasone-Salmeterol     Penicillins          Medication:    Current Facility-Administered Medications:     !!! Please obtain patients home medications that are stored in central pharmacy (including Breo inhaler in [facility]) and return to patient prior to discharge!, , Not Applicable, QUETA, Nicole Ch, PharmD, Given at 01/10/25 1575    acetaminophen (TYLENOL)  tablet 650 mg, 650 mg, Oral, Q4H PRN **OR** acetaminophen (TYLENOL) 160 MG/5ML oral solution 650 mg, 650 mg, Oral, Q4H PRN **OR** acetaminophen (TYLENOL) suppository 650 mg, 650 mg, Rectal, Q4H PRN, Rosi Medina MD    albuterol (PROVENTIL) nebulizer solution 0.083% 2.5 mg/3mL, 2.5 mg, Nebulization, Q4H PRN, Luis Lanier MD, 2.5 mg at 01/11/25 0537    aspirin chewable tablet 324 mg, 324 mg, Oral, Once, Rosi Medina MD    benzonatate (TESSALON) capsule 100 mg, 100 mg, Oral, TID PRN, Rosi Medina MD, 100 mg at 01/11/25 0839    sennosides-docusate (PERICOLACE) 8.6-50 MG per tablet 2 tablet, 2 tablet, Oral, BID PRN **AND** polyethylene glycol (MIRALAX) packet 17 g, 17 g, Oral, Daily PRN **AND** bisacodyl (DULCOLAX) EC tablet 5 mg, 5 mg, Oral, Daily PRN **AND** bisacodyl (DULCOLAX) suppository 10 mg, 10 mg, Rectal, Daily PRN, Rosi Medina MD    Calcium Replacement - Follow Nurse / BPA Driven Protocol, , Not Applicable, PRN, Rosi Medina MD    cefTRIAXone (ROCEPHIN) 2,000 mg in sodium chloride 0.9 % 100 mL MBP, 2,000 mg, Intravenous, Q24H, Anthony Hodges, PA, Last Rate: 200 mL/hr at 01/10/25 1710, 2,000 mg at 01/10/25 1710    dilTIAZem (CARDIZEM) 125 mg in 125 mL D5W infusion, 5-15 mg/hr, Intravenous, Continuous, Franchesca Stewart MD    doxycycline (MONODOX) capsule 100 mg, 100 mg, Oral, Q12H, Anthony Hodges, PA    Enoxaparin Sodium (LOVENOX) syringe 30 mg, 30 mg, Subcutaneous, Daily, Rosi Medina MD, 30 mg at 01/11/25 0839    Fluticasone Furoate-Vilanterol (BREO ELLIPTA) 200-25 MCG/ACT inhaler 1 puff **Patient Supplied Med**, 1 puff, Inhalation, Daily - RT, Dorian Sinha, [facility], 1 puff at 01/10/25 1930    Halls Cough Drops (lozenges), 1 lozenge, Buccal, Q2H PRN, Marlen Blevins PA-C    Hydrocod Varun-Chlorphe Varun ER (TUSSIONEX PENNKINETIC) 10-8 MG/5ML ER suspension 2.5 mL, 2.5 mL, Oral, Q12H PRN, Marlen Blevins PA-C, 2.5 mL at 01/11/25 0212    ipratropium-albuterol  (DUO-NEB) nebulizer solution 3 mL, 3 mL, Nebulization, 4x Daily - RT, Rosi Medina MD, 3 mL at 01/10/25 1929    lactated ringers bolus 500 mL, 500 mL, Intravenous, Once, Rosi Medina MD    Linezolid (ZYVOX) 600 mg 300 mL, 600 mg, Intravenous, Q12H, Rosi Medina MD, Last Rate: 300 mL/hr at 01/10/25 1611, 600 mg at 01/10/25 1611    LORazepam (ATIVAN) tablet 0.5 mg, 0.5 mg, Oral, BID PRN, Rosi Medina MD, 0.5 mg at 01/11/25 0839    Magnesium Standard Dose Replacement - Follow Nurse / BPA Driven Protocol, , Not Applicable, PRN, Rosi Medina MD    melatonin tablet 5 mg, 5 mg, Oral, Nightly, Rosi Medina MD    nitroglycerin (NITROSTAT) SL tablet 0.4 mg, 0.4 mg, Sublingual, Q5 Min PRN, Rosi Medina MD    pantoprazole (PROTONIX) EC tablet 40 mg, 40 mg, Oral, Nightly, Rosi Medina MD, 40 mg at 01/10/25 1957    Pharmacy Consult, , Not Applicable, Continuous PRN, Luis Lanier MD    Phosphorus Replacement - Follow Nurse / BPA Driven Protocol, , Not Applicable, PRN, Rosi Medina MD    potassium chloride (KLOR-CON) packet 40 mEq, 40 mEq, Oral, Q4H, Franchesca Stewart MD, 40 mEq at 01/11/25 0839    Potassium Replacement - Follow Nurse / BPA Driven Protocol, , Not Applicable, PRN, Rosi Medina MD    predniSONE (DELTASONE) tablet 20 mg, 20 mg, Oral, Daily With Breakfast, Luis Lanier MD, 20 mg at 01/11/25 0725    sodium chloride 0.9 % flush 10 mL, 10 mL, Intravenous, PRN, Rosi Medina MD    sodium chloride 0.9 % flush 10 mL, 10 mL, Intravenous, Q12H, Rosi Medina MD, 10 mL at 01/11/25 0840    sodium chloride 0.9 % flush 10 mL, 10 mL, Intravenous, Carol ENNIS Muhammad, MD    sodium chloride 0.9 % infusion 40 mL, 40 mL, Intravenous, Carol ENNIS Muhammad, MD    Antibiotics:  Anti-Infectives (From admission, onward)      Ordered     Dose/Rate Route Frequency Start Stop    01/10/25 1534  doxycycline (MONODOX) capsule 100 mg        Ordering  Provider: Anthony Hodges PA    100 mg Oral Every 12 Hours Scheduled 01/10/25 2100 25 2059    01/10/25 1534  cefTRIAXone (ROCEPHIN) 2,000 mg in sodium chloride 0.9 % 100 mL MBP        Ordering Provider: Anthony Hodges PA    2,000 mg  200 mL/hr over 30 Minutes Intravenous Every 24 Hours 01/10/25 1800 25 1759    25  meropenem (MERREM) 1,000 mg in sodium chloride 0.9 % 100 mL MBP  Status:  Discontinued        Ordering Provider: Rosi Medina MD    1,000 mg  over 3 Hours Intravenous Every 8 Hours 01/10/25 0600 01/10/25 1534    25  Linezolid (ZYVOX) 600 mg 300 mL        Ordering Provider: Rosi Medina MD    600 mg  300 mL/hr over 60 Minutes Intravenous Every 12 Hours 01/10/25 0500 01/15/25 0459    25  meropenem (MERREM) 1,000 mg in sodium chloride 0.9 % 100 mL MBP        Ordering Provider: Rosi Medina MD    1,000 mg  over 30 Minutes Intravenous Once 01/10/25 0000 01/10/25 0153    25 1717  Linezolid (ZYVOX) 600 mg 300 mL        Ordering Provider: Ezequiel Eaton DO    600 mg  300 mL/hr over 60 Minutes Intravenous Once 25 1733 25 1843    25 1717  cefepime 2000 mg IVPB in 100 mL NS (MBP)        Ordering Provider: Ezequiel Eaton DO    2,000 mg  over 30 Minutes Intravenous Once 25 1733 25 1817              Review of Systems:  See HPI      Physical Exam:   Vital Signs  Temp (24hrs), Av.1 °F (36.7 °C), Min:97.1 °F (36.2 °C), Max:99 °F (37.2 °C)    Temp  Min: 97.1 °F (36.2 °C)  Max: 99 °F (37.2 °C)  BP  Min: 94/55  Max: 122/90  Pulse  Min: 96  Max: 171  Resp  Min: 16  Max: 20  SpO2  Min: 90 %  Max: 97 %    GENERAL: Ill and cachectic appearing.  HEENT: Normocephalic, atraumatic.  PERRL. EOMI. No conjunctival injection. No icterus. Oropharynx clear without evidence of thrush or exudate.    NECK: Supple   HEART: RRR; No murmur, rubs, gallops.   LUNGS:   Bilateral right greater than left crackles  ABDOMEN: Soft,  nontender, nondistended. No rebound or guarding. NO mass or HSM.  EXT:  No cyanosis, clubbing or edema. No cord.  :  Without Ocasio catheter.  MSK: No joint effusions or erythema  SKIN: Warm and dry without cutaneous eruptions on Inspection/palpation.    NEURO: Oriented to PPT.  Motor 5/5 strength  PSYCHIATRIC: Normal insight and judgment. Cooperative with PE    Laboratory Data    Results from last 7 days   Lab Units 01/11/25  0408 01/09/25  1622   WBC 10*3/mm3 13.89* 16.67*   HEMOGLOBIN g/dL 9.6* 10.1*   HEMATOCRIT % 29.4* 30.4*   PLATELETS 10*3/mm3 509* 493*     Results from last 7 days   Lab Units 01/11/25  0408   SODIUM mmol/L 129*   POTASSIUM mmol/L 3.1*   CHLORIDE mmol/L 93*   CO2 mmol/L 27.0   BUN mg/dL 3*   CREATININE mg/dL 0.24*   GLUCOSE mg/dL 116*   CALCIUM mg/dL 8.6     Results from last 7 days   Lab Units 01/09/25  1622   ALK PHOS U/L 201*   BILIRUBIN mg/dL 0.3   ALT (SGPT) U/L 21   AST (SGOT) U/L 29     Results from last 7 days   Lab Units 01/09/25  1622   SED RATE mm/hr 113*     Results from last 7 days   Lab Units 01/09/25  1807   CRP mg/dL 13.13*         Results from last 7 days   Lab Units 01/09/25  1807   CK TOTAL U/L 52         Estimated Creatinine Clearance: 178 mL/min (A) (by C-G formula based on SCr of 0.24 mg/dL (L)).      Microbiology:  Microbiology Results (last 10 days)       Procedure Component Value - Date/Time    MRSA Screen, PCR (Inpatient) - Swab, Nares [[id number]]  (Normal) Collected: 01/10/25 1435    Lab Status: Final result Specimen: Swab from Nares Updated: 01/10/25 1607     MRSA PCR Negative    Narrative:      The negative predictive value of this diagnostic test is high and should only be used to consider de-escalating anti-MRSA therapy. A positive result may indicate colonization with MRSA and must be correlated clinically.  MRSA Negative    S. Pneumo Ag Urine or CSF - Urine, Urine, Clean Catch [[id number]]  (Normal) Collected: 01/10/25 0729    Lab Status: Final result  Specimen: Urine, Clean Catch Updated: 01/10/25 1353     Strep Pneumo Ag Negative    Legionella Antigen, Urine - Urine, Urine, Clean Catch [[id number]]  (Normal) Collected: 01/10/25 0729    Lab Status: Final result Specimen: Urine, Clean Catch Updated: 01/10/25 1353     LEGIONELLA ANTIGEN, URINE Negative    Respiratory Panel PCR w/COVID-19(SARS-CoV-2) MARIANELA/BRONWYN/LUZ MARINA/PAD/COR/KINGS In-House, NP Swab in UTM/VTM, 2 HR TAT - Swab, Nasopharynx [[id number]]  (Normal) Collected: 01/10/25 0133    Lab Status: Final result Specimen: Swab from Nasopharynx Updated: 01/10/25 0238     ADENOVIRUS, PCR Not Detected     Coronavirus 229E Not Detected     Coronavirus HKU1 Not Detected     Coronavirus NL63 Not Detected     Coronavirus OC43 Not Detected     COVID19 Not Detected     Human Metapneumovirus Not Detected     Human Rhinovirus/Enterovirus Not Detected     Influenza A PCR Not Detected     Influenza B PCR Not Detected     Parainfluenza Virus 1 Not Detected     Parainfluenza Virus 2 Not Detected     Parainfluenza Virus 3 Not Detected     Parainfluenza Virus 4 Not Detected     RSV, PCR Not Detected     Bordetella pertussis pcr Not Detected     Bordetella parapertussis PCR Not Detected     Chlamydophila pneumoniae PCR Not Detected     Mycoplasma pneumo by PCR Not Detected    Narrative:      In the setting of a positive respiratory panel with a viral infection PLUS a negative procalcitonin without other underlying concern for bacterial infection, consider observing off antibiotics or discontinuation of antibiotics and continue supportive care. If the respiratory panel is positive for atypical bacterial infection (Bordetella pertussis, Chlamydophila pneumoniae, or Mycoplasma pneumoniae), consider antibiotic de-escalation to target atypical bacterial infection.                  Radiology:  Imaging Results (Last 72 Hours)       Procedure Component Value Units Date/Time    CT Chest Without Contrast Diagnostic [[id number]] Collected: 01/09/25 1392      Updated: 01/09/25 1810    Narrative:      CT CHEST WO CONTRAST DIAGNOSTIC    Date of Exam: 1/9/2025 5:35 PM EST    Indication: cough, wheezing, right sided pain with HX of MAC.    Comparison: 9/20/2022    Technique: Axial CT images were obtained of the chest without contrast administration.  Reconstructed coronal and sagittal images were also obtained. Automated exposure control and iterative construction methods were used.      Findings:  MEDIASTINUM: Unremarkable. Aortic and heart size are normal. No mass nor pericardial effusion.  CORONARY ARTERIES: No calcified atherosclerotic disease.  LUNGS: There is inferior right upper lobe and right middle lobe airspace disease consistent multifocal pneumonia. This is superimposed on a background pattern of emphysema and partially calcified opacities in both lungs which are similar. Previous 12 mm   right lower lobe nodule now measures 8 mm. No new/suspicious nodule is identified.  PLEURAL SPACE: No effusion, mass, nor pneumothorax.  LYMPH NODES: There are no pathologically enlarged lymph nodes.    UPPER ABDOMEN: Unremarkable    OSSEOUS STRUCTURES: Appropriate for age with no acute process identified.          Impression:      Impression:  1.Right lung multifocal pneumonia  2.Decrease in size of right lower lobe pulmonary nodule. No new suspicious nodule identified.  3.Other chronic findings as discussed.        Electronically Signed: Gustavo Proctor MD    1/9/2025 6:07 PM EST    Workstation ID: [id number]    XR Chest 1 View [[id number]] Collected: 01/09/25 1710     Updated: 01/09/25 1717    Narrative:      XR CHEST 1 VW    Date of Exam: 1/9/2025 4:50 PM EST    Indication: Chest Pain Triage Protocol    Comparison: CT chest dated 9/20/2022    Findings:  The cardiomediastinal silhouette is within normal limits. Pulmonary vascularity appears normal. The lungs are hyperinflated with with underlying COPD/emphysema. There are interstitial opacities within the right lung likely  representing infectious or   inflammatory process. There is biapical scarring. There is a bleb within the superior lateral aspect of the right upper lobe with surrounding thickened wall. This is new from prior exam. There is no pleural effusion.      Impression:      Impression:  1.Hyperinflated lungs with underlying COPD/emphysema.  2.Interstitial opacities within the right lung likely representing infectious or inflammatory process.  3.New bleb within the superior lateral aspect of the right upper lobe with surrounding thickening of the wall.        Electronically Signed: Pito Hutton    1/9/2025 5:14 PM EST    Workstation ID: [id number]         I read her radiographic images.      Impression:   Multifocal pneumonia, likely pneumococcal despite negative S.pneumoniae urinary antigen.    MRSA nasal PCR is negative and I will discontinue linezolid.  I will leave her on intravenous Rocephin but change the doxycycline to minocycline.  Mycobacterium avium complex pulmonary infection -since 2016, with medical noncompliance of antibiotics.  She has been rotating Levaquin, azithromycin, and rifampin d/t side effects.  She is not compliant with ID follow up. She is at high risk for developing a resistant BONI strain that will make it difficult to treat in the future.   Leukocytosis/neutrophilia  End stage chronic obstructive pulmonary disease/on 3L O2 baseline at home  Protein calorie malnutrition  Penicillin list allergy.  Seems to tolerate cephalosporins.  Medical noncompliance-this complicates all aspects of her care and increases her risk for poor outcome.     Health maintenance-I again discussed the importance of proceeding with Prevnar 20 pneumococcal vaccination, influenza vaccination, RSV vaccination, and COVID-19 vaccination with her today.    PLAN/RECOMMENDATIONS:   Rocephin 2 GM IV daily  Change doxycycline to minocycline 100 mg p.o. twice daily  Discontinue linezolid   Continue O2 support   Prevnar 20  pneumococcal vaccination, influenza vaccination, RSV vaccination, and COVID-19 vaccine booster in the near future     This visit included the following complex service elements:  Complex medical decision-making associated with antimicrobial prescribing.  In-depth chart review with high level synthesis for complex diagnoses.  Managed infection treatment protocol associated with transitions of care for this complex patient.  Counseled patients, family members and/or caregivers regarding infection prevention.    Az Wasserman MD  1/11/2025  08:40 EST

## 2025-01-11 NOTE — PROGRESS NOTES
[facility] Medicine Services  PROGRESS NOTE    Patient Name: Kristen Jean  : 1960  MRN: [id number]    Date of Admission: 2025  Primary Care Physician: Moiz Nova MD    Subjective   Subjective     CC:  SOA    HPI:  Pt went into Afib with RVR overnight. Did not improve with one time dose of Metoprolol 2.5 mg IV.  Started on Dilt gtt this am. Pt states that she is asymptomatic. Denies h/o Afib.     Objective   Objective     Vital Signs:   Temp:  [97.1 °F (36.2 °C)-99 °F (37.2 °C)] 98.1 °F (36.7 °C)  Heart Rate:  [101-171] 104  Resp:  [16-20] 18  BP: ()/(50-90) 114/54  Flow (L/min) (Oxygen Therapy):  [2.5-3] 3     Physical Exam:  Constitutional: No acute distress, awake, alert, cachetic appearing WF in NAD  HENT: NCAT, mucous membranes moist  Respiratory: diffuse rhonchi bilaterally   Cardiovascular: irregularly irregular, tachycardic, no murmurs, rubs, or gallops  Gastrointestinal: Positive bowel sounds, soft, nontender, nondistended  Musculoskeletal: No bilateral ankle edema  Psychiatric: Appropriate affect, cooperative  Neurologic: Oriented x 3, strength symmetric in all extremities, Cranial Nerves grossly intact to confrontation, speech clear  Skin: No rashes      Results Reviewed:  LAB RESULTS:      Lab 25  0408 25  1807 25  1622   WBC 13.89*  --  16.67*   HEMOGLOBIN 9.6*  --  10.1*   HEMATOCRIT 29.4*  --  30.4*   PLATELETS 509*  --  493*   NEUTROS ABS 9.68*  --  12.88*   IMMATURE GRANS (ABS) 0.15*  --  0.09*   LYMPHS ABS 1.60  --  1.74   MONOS ABS 1.41*  --  1.50*   EOS ABS 0.99*  --  0.42*   MCV 81.2  --  79.8   SED RATE  --   --  113*   CRP  --  13.13*  --    PROCALCITONIN  --  0.09  --    HSTROP T  --  12 11         Lab 25  0408 25  1807 25  1622   SODIUM 129*  --  127*   POTASSIUM 3.1*  --  3.4*   CHLORIDE 93*  --  90*   CO2 27.0  --  27.0   ANION GAP 9.0  --  10.0   BUN 3*  --  5*   CREATININE 0.24*  --  0.31*   EGFR  126.0  --  118.4   GLUCOSE 116*  --  131*   CALCIUM 8.6  --  9.1   MAGNESIUM 1.8  --   --    HEMOGLOBIN A1C  --   --  6.00*   TSH  --  0.160*  --          Lab 01/09/25  1622   TOTAL PROTEIN 6.1   ALBUMIN 3.2*   GLOBULIN 2.9   ALT (SGPT) 21   AST (SGOT) 29   BILIRUBIN 0.3   ALK PHOS 201*   LIPASE 18         Lab 01/09/25  1807 01/09/25  1622   PROBNP  --  264.7   HSTROP T 12 11         Lab 01/09/25  1807   CHOLESTEROL 144   LDL CHOL 88   HDL CHOL 41   TRIGLYCERIDES 74             Brief Urine Lab Results  (Last result in the past 365 days)        Color   Clarity   Blood   Leuk Est   Nitrite   Protein   CREAT   Urine HCG        01/10/25 0730 Yellow   Clear   Negative   Negative   Negative   Negative                   Microbiology Results Abnormal       None            CT Chest Without Contrast Diagnostic    Result Date: 1/9/2025  CT CHEST WO CONTRAST DIAGNOSTIC Date of Exam: 1/9/2025 5:35 PM EST Indication: cough, wheezing, right sided pain with HX of MAC. Comparison: 9/20/2022 Technique: Axial CT images were obtained of the chest without contrast administration.  Reconstructed coronal and sagittal images were also obtained. Automated exposure control and iterative construction methods were used. Findings: MEDIASTINUM: Unremarkable. Aortic and heart size are normal. No mass nor pericardial effusion. CORONARY ARTERIES: No calcified atherosclerotic disease. LUNGS: There is inferior right upper lobe and right middle lobe airspace disease consistent multifocal pneumonia. This is superimposed on a background pattern of emphysema and partially calcified opacities in both lungs which are similar. Previous 12 mm right lower lobe nodule now measures 8 mm. No new/suspicious nodule is identified. PLEURAL SPACE: No effusion, mass, nor pneumothorax. LYMPH NODES: There are no pathologically enlarged lymph nodes. UPPER ABDOMEN: Unremarkable OSSEOUS STRUCTURES: Appropriate for age with no acute process identified.     Impression:  Impression: 1.Right lung multifocal pneumonia 2.Decrease in size of right lower lobe pulmonary nodule. No new suspicious nodule identified. 3.Other chronic findings as discussed. Electronically Signed: Gustavo Proctor MD  1/9/2025 6:07 PM EST  Workstation ID: [id number]    XR Chest 1 View    Result Date: 1/9/2025  XR CHEST 1 VW Date of Exam: 1/9/2025 4:50 PM EST Indication: Chest Pain Triage Protocol Comparison: CT chest dated 9/20/2022 Findings: The cardiomediastinal silhouette is within normal limits. Pulmonary vascularity appears normal. The lungs are hyperinflated with with underlying COPD/emphysema. There are interstitial opacities within the right lung likely representing infectious or inflammatory process. There is biapical scarring. There is a bleb within the superior lateral aspect of the right upper lobe with surrounding thickened wall. This is new from prior exam. There is no pleural effusion.     Impression: Impression: 1.Hyperinflated lungs with underlying COPD/emphysema. 2.Interstitial opacities within the right lung likely representing infectious or inflammatory process. 3.New bleb within the superior lateral aspect of the right upper lobe with surrounding thickening of the wall. Electronically Signed: Pito Brennen  1/9/2025 5:14 PM EST  Workstation ID: [id number]         Current medications:  Scheduled Meds:Pharmacy Consult, , Not Applicable, BID  aspirin, 324 mg, Oral, Once  cefTRIAXone, 2,000 mg, Intravenous, Q24H  enoxaparin, 30 mg, Subcutaneous, Daily  Fluticasone Furoate-Vilanterol, 1 puff, Inhalation, Daily - RT  ipratropium-albuterol, 3 mL, Nebulization, 4x Daily - RT  lactated ringers, 500 mL, Intravenous, Once  melatonin, 5 mg, Oral, Nightly  minocycline, 100 mg, Oral, Q12H  pantoprazole, 40 mg, Oral, Nightly  potassium chloride, 40 mEq, Oral, Q4H  predniSONE, 20 mg, Oral, Daily With Breakfast  sodium chloride, 10 mL, Intravenous, Q12H      Continuous Infusions:dilTIAZem, 5-15 mg/hr, Last Rate:  15 mg/hr (01/11/25 0928)  Pharmacy Consult,       PRN Meds:.  acetaminophen **OR** acetaminophen **OR** acetaminophen    Albuterol Sulfate NEB Orderable    benzonatate    senna-docusate sodium **AND** polyethylene glycol **AND** bisacodyl **AND** bisacodyl    Calcium Replacement - Follow Nurse / BPA Driven Protocol    Halls Cough Drops    Hydrocod Varun-Chlorphe Varun ER    LORazepam    Magnesium Standard Dose Replacement - Follow Nurse / BPA Driven Protocol    nitroglycerin    Pharmacy Consult    Phosphorus Replacement - Follow Nurse / BPA Driven Protocol    Potassium Replacement - Follow Nurse / BPA Driven Protocol    sodium chloride    sodium chloride    sodium chloride    Assessment & Plan   Assessment & Plan     Active Hospital Problems    Diagnosis  POA    **Pneumonia [J18.9]  Yes    Severe protein-calorie malnutrition [E43]  Yes    New onset a-fib [I48.91]  Unknown    Emphysema of lung [J43.9]  Unknown    Asthma-COPD overlap syndrome [J44.89]  Unknown    Anxiety disorder [F41.9]  Unknown    Multifocal pneumonia [J18.9]  Unknown    Hyponatremia [E87.1]  Unknown    Hypokalemia [E87.6]  Unknown    Protein calorie malnutrition [E46]  Unknown    Cachexia [R64]  Unknown    End stage COPD [J44.9]  Unknown    Hypothyroidism (acquired) [E03.9]  Unknown    Mycobacterium avium complex [A31.0]  Unknown    T2DM (type 2 diabetes mellitus) [E11.9]  Unknown    Chronic bronchitis [J42]  Yes    Mycobacterium infection [A31.9]  Yes      Resolved Hospital Problems   No resolved problems to display.        Brief Hospital Course to date:  Kristen Jean is a 64 y.o. female  with a PMH significant for pulmonary Mycobacterium avium complex diagnosed 2015, end-stage COPD, COPD/asthma overlap syndrome, advanced emphysema, anxiety and protein calorie malnutrition with COPD cachexia who presented to [facility] ED secondary to worsening shortness of breath, higher oxygen requirements, right-sided pleuritic chest pain, pain in  lower chest on the right side and was found to have multifocal PNA.    This patient's problems and plans were partially entered by my partner and updated as appropriate by me 01/11/25.    Plan:     Pneumonia  Multifocal pneumonia  Mycobacterium avium complex  End-stage COPD  Asthma - COPD overlap syndrome  Emphysema of the lung  Chronic bronchitis  Patient has a history that is significant for advanced COPD with a history of asthma - COPD overlap syndrome, follows [facility] pulmonology  - Does have a history of Mycobacterium AVM complex, appears on CT that her previous RLL nodule has decreased in size  - CT chest without contrast revealed multifocal pneumonia more pronounced RML/RLL  - Management of pneumonia does become somewhat complicated due to underlying MAC, patient is currently on rifampin as outpatient with recent fill history and azithromycin  - MRSA swab, streptococcal Legionella urine antigens, respiratory culture PENDING   -- respiratory viral PCR panel negative  - Consulted ID,  will require guidance on antimicrobial regimen and duration. Appreciate Dr. Wasserman's assistance. Continue Rocephin/Minocycline per his recs.   - Consulted pulmonology due to extent of pulmonary disease  - DuoNebs, Tessalon Perles and albuterol inhaler  - Continue nasal cannula oxygen and wean as tolerated     Hyponatremia  Hypokalemia  Suspect SIADH in the setting of pneumonia versus hypovolemic hyponatremia  - s/p IVFs  -- Na+ better today  - Potassium replacement protocol in place    New Onset Afib with RVR  -- started dilt gtt, continue as BP allows  -- consult Cardiology given new diagnosis, appreciate their assistance  -- will need initiation of anticoagulation vs ASA, [id number] of 1 currently (as HbA1C not consistent with T2DM).      Protein calorie malnutrition  Cachexia of end-stage lung disease  Patient has low albumin, appears cachectic likely secondary to advanced lung disease  - Nutrition consult for  assessment of malnutrition     Goals of care  -- pt still full code for time being.  Will continue to discuss with her given her poor overall prognosis      Low TSH  -- Likely subclinical thyroiditis with low TSH and normal free T4, in the setting of pneumonia  -- Will need repeat of thyroid function in 6 to 8 weeks.     T2DM/prediabetes   HbA1c 6%   LDL 88    Anxiety  -- will reorder her benzo per home meds/fill record (reports that she takes 1/2 of her prescribed med. I.e. 0.25 mg BID)      Total time spent: Time Spent: Time Spent: 35 minutes  Time spent includes time reviewing chart, face-to-face time, counseling patient/family/caregiver, ordering medications/tests/procedures, communicating with other health care professionals, documenting clinical information in the electronic health record, and coordination of care.      Expected Discharge Location and Transportation: home   Expected Discharge   Expected Discharge Date: 1/13/2025; Expected Discharge Time:      VTE Prophylaxis:  Pharmacologic & mechanical VTE prophylaxis orders are present.         AM-PAC 6 Clicks Score (PT): 22 (01/11/25 7430)    CODE STATUS:   Code Status and Medical Interventions: CPR (Attempt to Resuscitate); Full Support   Ordered at: 01/09/25 2009     Level Of Support Discussed With:    Patient     Code Status (Patient has no pulse and is not breathing):    CPR (Attempt to Resuscitate)     Medical Interventions (Patient has pulse or is breathing):    Full Support       Franchesca Stewart MD  01/11/25

## 2025-01-11 NOTE — PLAN OF CARE
Problem: Adult Inpatient Plan of Care  Goal: Plan of Care Review  Outcome: Progressing  Flowsheets (Taken 1/11/2025 1031)  Progress: improving  Plan of Care Reviewed With: patient  Goal: Patient-Specific Goal (Individualized)  Outcome: Progressing  Goal: Absence of Hospital-Acquired Illness or Injury  Outcome: Progressing  Intervention: Identify and Manage Fall Risk  Recent Flowsheet Documentation  Taken 1/11/2025 0730 by Roberto Apodaca RN  Safety Promotion/Fall Prevention:   activity supervised   nonskid shoes/slippers when out of bed   room organization consistent   safety round/check completed  Intervention: Prevent Skin Injury  Recent Flowsheet Documentation  Taken 1/11/2025 0730 by Roberto Apodaca RN  Body Position:   position changed independently   weight shifting  Skin Protection: drying agents applied  Intervention: Prevent and Manage VTE (Venous Thromboembolism) Risk  Recent Flowsheet Documentation  Taken 1/11/2025 0730 by Roberto Apodaca RN  VTE Prevention/Management: (Patient is refusing medication and SCD's) other (see comments)  Intervention: Prevent Infection  Recent Flowsheet Documentation  Taken 1/11/2025 0730 by Roberto Apodaca RN  Infection Prevention:   environmental surveillance performed   equipment surfaces disinfected   hand hygiene promoted   personal protective equipment utilized   rest/sleep promoted   single patient room provided  Goal: Optimal Comfort and Wellbeing  Outcome: Progressing  Intervention: Provide Person-Centered Care  Recent Flowsheet Documentation  Taken 1/11/2025 0730 by Roberto Apodaca RN  Trust Relationship/Rapport:   care explained   choices provided   emotional support provided   empathic listening provided   questions answered   questions encouraged   reassurance provided   thoughts/feelings acknowledged  Goal: Readiness for Transition of Care  Outcome: Progressing     Problem: Fall Injury Risk  Goal: Absence of Fall and Fall-Related Injury  Outcome:  Progressing  Intervention: Identify and Manage Contributors  Recent Flowsheet Documentation  Taken 1/11/2025 0730 by Roberto Apodaca RN  Medication Review/Management: medications reviewed  Self-Care Promotion:   BADL personal objects within reach   BADL personal routines maintained   adaptive equipment use encouraged  Intervention: Promote Injury-Free Environment  Recent Flowsheet Documentation  Taken 1/11/2025 0730 by Roberto Apodaca RN  Safety Promotion/Fall Prevention:   activity supervised   nonskid shoes/slippers when out of bed   room organization consistent   safety round/check completed     Problem: Pain Acute  Goal: Optimal Pain Control and Function  Outcome: Progressing  Intervention: Optimize Psychosocial Wellbeing  Recent Flowsheet Documentation  Taken 1/11/2025 0730 by Roberto Apodaca RN  Supportive Measures:   active listening utilized   self-reflection promoted   self-responsibility promoted  Spiritual Activities Assistance:   affirmation provided   personal rituals encouraged  Intervention: Prevent or Manage Pain  Recent Flowsheet Documentation  Taken 1/11/2025 0730 by Roberto Apodaca RN  Sleep/Rest Enhancement:   awakenings minimized   natural light exposure provided   relaxation techniques promoted  Medication Review/Management: medications reviewed     Problem: Infection  Goal: Absence of Infection Signs and Symptoms  Outcome: Progressing  Intervention: Prevent or Manage Infection  Recent Flowsheet Documentation  Taken 1/11/2025 0730 by Roberto Apodaca RN  Infection Management: aseptic technique maintained  Fever Reduction/Comfort Measures:   lightweight bedding   lightweight clothing   Goal Outcome Evaluation:  Plan of Care Reviewed With: patient        Progress: improving

## 2025-01-12 ENCOUNTER — APPOINTMENT (OUTPATIENT)
Dept: CARDIOLOGY | Facility: HOSPITAL | Age: 65
DRG: 193 | End: 2025-01-12
Payer: COMMERCIAL

## 2025-01-12 PROBLEM — I47.19 ATRIAL TACHYCARDIA: Status: ACTIVE | Noted: 2025-01-12

## 2025-01-12 LAB
ANION GAP SERPL CALCULATED.3IONS-SCNC: 7 MMOL/L (ref 5–15)
ASCENDING AORTA: 2.9 CM
AV MEAN PRESS GRAD SYS DOP V1V2: 3.8 MMHG
AV VMAX SYS DOP: 135.2 CM/SEC
BASOPHILS # BLD AUTO: 0.06 10*3/MM3 (ref 0–0.2)
BASOPHILS NFR BLD AUTO: 0.5 % (ref 0–1.5)
BH CV ECHO MEAS - AO MAX PG: 7.3 MMHG
BH CV ECHO MEAS - AO ROOT DIAM: 3.1 CM
BH CV ECHO MEAS - AO V2 VTI: 24.6 CM
BH CV ECHO MEAS - AVA(I,D): 2.09 CM2
BH CV ECHO MEAS - EDV(CUBED): 85.2 ML
BH CV ECHO MEAS - EDV(MOD-SP2): 61.8 ML
BH CV ECHO MEAS - EDV(MOD-SP4): 68.2 ML
BH CV ECHO MEAS - EF(MOD-SP2): 73 %
BH CV ECHO MEAS - EF(MOD-SP4): 75.2 %
BH CV ECHO MEAS - ESV(CUBED): 17.6 ML
BH CV ECHO MEAS - ESV(MOD-SP2): 16.7 ML
BH CV ECHO MEAS - ESV(MOD-SP4): 16.9 ML
BH CV ECHO MEAS - FS: 40.9 %
BH CV ECHO MEAS - IVS/LVPW: 0.71 CM
BH CV ECHO MEAS - IVSD: 0.5 CM
BH CV ECHO MEAS - LA DIMENSION: 3 CM
BH CV ECHO MEAS - LAT PEAK E' VEL: 13.3 CM/SEC
BH CV ECHO MEAS - LV MASS(C)D: 75.8 GRAMS
BH CV ECHO MEAS - LV MAX PG: 2.8 MMHG
BH CV ECHO MEAS - LV MEAN PG: 1.33 MMHG
BH CV ECHO MEAS - LV V1 MAX: 83.3 CM/SEC
BH CV ECHO MEAS - LV V1 VTI: 16.4 CM
BH CV ECHO MEAS - LVIDD: 4.4 CM
BH CV ECHO MEAS - LVIDS: 2.6 CM
BH CV ECHO MEAS - LVOT AREA: 3.1 CM2
BH CV ECHO MEAS - LVOT DIAM: 2 CM
BH CV ECHO MEAS - LVPWD: 0.7 CM
BH CV ECHO MEAS - MED PEAK E' VEL: 10.6 CM/SEC
BH CV ECHO MEAS - MV A MAX VEL: 59.1 CM/SEC
BH CV ECHO MEAS - MV DEC SLOPE: 302 CM/SEC2
BH CV ECHO MEAS - MV DEC TIME: 0.33 SEC
BH CV ECHO MEAS - MV E MAX VEL: 75.5 CM/SEC
BH CV ECHO MEAS - MV E/A: 1.28
BH CV ECHO MEAS - MV P1/2T: 93.4 MSEC
BH CV ECHO MEAS - MVA(P1/2T): 2.36 CM2
BH CV ECHO MEAS - PA ACC TIME: 0.09 SEC
BH CV ECHO MEAS - PA V2 MAX: 122 CM/SEC
BH CV ECHO MEAS - PAPD(PI EDV): 4 MMHG
BH CV ECHO MEAS - PI END-D VEL: 99.1 CM/SEC
BH CV ECHO MEAS - RAP SYSTOLE: 8 MMHG
BH CV ECHO MEAS - RVSP: 28 MMHG
BH CV ECHO MEAS - SV(LVOT): 51.5 ML
BH CV ECHO MEAS - SV(MOD-SP2): 45.1 ML
BH CV ECHO MEAS - SV(MOD-SP4): 51.3 ML
BH CV ECHO MEAS - TAPSE (>1.6): 2.5 CM
BH CV ECHO MEAS - TR MAX PG: 20.3 MMHG
BH CV ECHO MEAS - TR MAX VEL: 224.7 CM/SEC
BH CV ECHO MEASUREMENTS AVERAGE E/E' RATIO: 6.32
BH CV VAS BP LEFT ARM: NORMAL MMHG
BH CV XLRA - RV BASE: 4.2 CM
BH CV XLRA - RV LENGTH: 6.6 CM
BH CV XLRA - RV MID: 3.7 CM
BH CV XLRA - TDI S': 12.1 CM/SEC
BUN SERPL-MCNC: 3 MG/DL (ref 8–23)
BUN/CREAT SERPL: 15.8 (ref 7–25)
CALCIUM SPEC-SCNC: 8.8 MG/DL (ref 8.6–10.5)
CHLORIDE SERPL-SCNC: 98 MMOL/L (ref 98–107)
CO2 SERPL-SCNC: 29 MMOL/L (ref 22–29)
CREAT SERPL-MCNC: 0.19 MG/DL (ref 0.57–1)
DEPRECATED RDW RBC AUTO: 45.7 FL (ref 37–54)
EGFRCR SERPLBLD CKD-EPI 2021: 133.3 ML/MIN/1.73
EOSINOPHIL # BLD AUTO: 0.8 10*3/MM3 (ref 0–0.4)
EOSINOPHIL NFR BLD AUTO: 6.9 % (ref 0.3–6.2)
ERYTHROCYTE [DISTWIDTH] IN BLOOD BY AUTOMATED COUNT: 15.4 % (ref 12.3–15.4)
GLUCOSE SERPL-MCNC: 99 MG/DL (ref 65–99)
HCT VFR BLD AUTO: 28.8 % (ref 34–46.6)
HGB BLD-MCNC: 9.4 G/DL (ref 12–15.9)
IMM GRANULOCYTES # BLD AUTO: 0.17 10*3/MM3 (ref 0–0.05)
IMM GRANULOCYTES NFR BLD AUTO: 1.5 % (ref 0–0.5)
LEFT ATRIUM VOLUME INDEX: 21.9 ML/M2
LV EF 2D ECHO EST: 73 %
LYMPHOCYTES # BLD AUTO: 1.76 10*3/MM3 (ref 0.7–3.1)
LYMPHOCYTES NFR BLD AUTO: 15.1 % (ref 19.6–45.3)
MAGNESIUM SERPL-MCNC: 2.2 MG/DL (ref 1.6–2.4)
MCH RBC QN AUTO: 26.7 PG (ref 26.6–33)
MCHC RBC AUTO-ENTMCNC: 32.6 G/DL (ref 31.5–35.7)
MCV RBC AUTO: 81.8 FL (ref 79–97)
MONOCYTES # BLD AUTO: 1.16 10*3/MM3 (ref 0.1–0.9)
MONOCYTES NFR BLD AUTO: 10 % (ref 5–12)
NEUTROPHILS NFR BLD AUTO: 66 % (ref 42.7–76)
NEUTROPHILS NFR BLD AUTO: 7.69 10*3/MM3 (ref 1.7–7)
NRBC BLD AUTO-RTO: 0 /100 WBC (ref 0–0.2)
PHOSPHATE SERPL-MCNC: 3.6 MG/DL (ref 2.5–4.5)
PLATELET # BLD AUTO: 648 10*3/MM3 (ref 140–450)
PMV BLD AUTO: 9.7 FL (ref 6–12)
POTASSIUM SERPL-SCNC: 4.2 MMOL/L (ref 3.5–5.2)
RBC # BLD AUTO: 3.52 10*6/MM3 (ref 3.77–5.28)
SODIUM SERPL-SCNC: 134 MMOL/L (ref 136–145)
WBC NRBC COR # BLD AUTO: 11.64 10*3/MM3 (ref 3.4–10.8)

## 2025-01-12 PROCEDURE — 94664 DEMO&/EVAL PT USE INHALER: CPT

## 2025-01-12 PROCEDURE — 93010 ELECTROCARDIOGRAM REPORT: CPT | Performed by: INTERNAL MEDICINE

## 2025-01-12 PROCEDURE — 85025 COMPLETE CBC W/AUTO DIFF WBC: CPT | Performed by: INTERNAL MEDICINE

## 2025-01-12 PROCEDURE — 93306 TTE W/DOPPLER COMPLETE: CPT

## 2025-01-12 PROCEDURE — 84100 ASSAY OF PHOSPHORUS: CPT | Performed by: NURSE PRACTITIONER

## 2025-01-12 PROCEDURE — 63710000001 PREDNISONE PER 1 MG: Performed by: INTERNAL MEDICINE

## 2025-01-12 PROCEDURE — 25010000002 CEFTRIAXONE PER 250 MG: Performed by: PHYSICIAN ASSISTANT

## 2025-01-12 PROCEDURE — 94799 UNLISTED PULMONARY SVC/PX: CPT

## 2025-01-12 PROCEDURE — 93005 ELECTROCARDIOGRAM TRACING: CPT | Performed by: INTERNAL MEDICINE

## 2025-01-12 PROCEDURE — 80048 BASIC METABOLIC PNL TOTAL CA: CPT | Performed by: INTERNAL MEDICINE

## 2025-01-12 PROCEDURE — 83735 ASSAY OF MAGNESIUM: CPT | Performed by: INTERNAL MEDICINE

## 2025-01-12 PROCEDURE — 25810000003 SODIUM CHLORIDE 0.9 % SOLUTION 250 ML FLEX CONT: Performed by: NURSE PRACTITIONER

## 2025-01-12 PROCEDURE — 25010000002 DIGOXIN PER 500 MCG: Performed by: INTERNAL MEDICINE

## 2025-01-12 PROCEDURE — 99232 SBSQ HOSP IP/OBS MODERATE 35: CPT | Performed by: INTERNAL MEDICINE

## 2025-01-12 PROCEDURE — 99233 SBSQ HOSP IP/OBS HIGH 50: CPT | Performed by: INTERNAL MEDICINE

## 2025-01-12 PROCEDURE — 93306 TTE W/DOPPLER COMPLETE: CPT | Performed by: INTERNAL MEDICINE

## 2025-01-12 RX ORDER — LORAZEPAM 0.5 MG/1
0.25 TABLET ORAL EVERY 6 HOURS PRN
Status: DISCONTINUED | OUTPATIENT
Start: 2025-01-12 | End: 2025-01-20 | Stop reason: HOSPADM

## 2025-01-12 RX ADMIN — DILTIAZEM HYDROCHLORIDE 60 MG: 60 TABLET, FILM COATED ORAL at 15:30

## 2025-01-12 RX ADMIN — LORAZEPAM 0.25 MG: 0.5 TABLET ORAL at 21:53

## 2025-01-12 RX ADMIN — IPRATROPIUM BROMIDE AND ALBUTEROL SULFATE 3 ML: 2.5; .5 SOLUTION RESPIRATORY (INHALATION) at 22:01

## 2025-01-12 RX ADMIN — PANTOPRAZOLE SODIUM 40 MG: 40 TABLET, DELAYED RELEASE ORAL at 21:42

## 2025-01-12 RX ADMIN — IPRATROPIUM BROMIDE AND ALBUTEROL SULFATE 3 ML: 2.5; .5 SOLUTION RESPIRATORY (INHALATION) at 16:25

## 2025-01-12 RX ADMIN — BENZONATATE 100 MG: 100 CAPSULE ORAL at 07:58

## 2025-01-12 RX ADMIN — DIGOXIN 250 MCG: 0.25 INJECTION INTRAMUSCULAR; INTRAVENOUS at 06:41

## 2025-01-12 RX ADMIN — APIXABAN 5 MG: 5 TABLET, FILM COATED ORAL at 21:43

## 2025-01-12 RX ADMIN — DIGOXIN 250 MCG: 0.25 INJECTION INTRAMUSCULAR; INTRAVENOUS at 00:39

## 2025-01-12 RX ADMIN — LORAZEPAM 0.25 MG: 0.5 TABLET ORAL at 16:00

## 2025-01-12 RX ADMIN — MINOCYCLINE HYDROCHLORIDE 100 MG: 50 CAPSULE ORAL at 21:43

## 2025-01-12 RX ADMIN — BENZONATATE 100 MG: 100 CAPSULE ORAL at 18:00

## 2025-01-12 RX ADMIN — Medication 10 ML: at 08:00

## 2025-01-12 RX ADMIN — SODIUM CHLORIDE 2000 MG: 900 INJECTION INTRAVENOUS at 17:49

## 2025-01-12 RX ADMIN — IPRATROPIUM BROMIDE AND ALBUTEROL SULFATE 3 ML: 2.5; .5 SOLUTION RESPIRATORY (INHALATION) at 13:45

## 2025-01-12 RX ADMIN — LORAZEPAM 0.25 MG: 0.5 TABLET ORAL at 07:59

## 2025-01-12 RX ADMIN — IPRATROPIUM BROMIDE AND ALBUTEROL SULFATE 3 ML: 2.5; .5 SOLUTION RESPIRATORY (INHALATION) at 09:01

## 2025-01-12 RX ADMIN — SODIUM PHOSPHATE, MONOBASIC, MONOHYDRATE AND SODIUM PHOSPHATE, DIBASIC, ANHYDROUS 15 MMOL: 142; 276 INJECTION, SOLUTION INTRAVENOUS at 00:35

## 2025-01-12 RX ADMIN — MINOCYCLINE HYDROCHLORIDE 100 MG: 50 CAPSULE ORAL at 08:00

## 2025-01-12 RX ADMIN — APIXABAN 5 MG: 5 TABLET, FILM COATED ORAL at 07:59

## 2025-01-12 RX ADMIN — METOPROLOL TARTRATE 25 MG: 25 TABLET, FILM COATED ORAL at 07:59

## 2025-01-12 RX ADMIN — PREDNISONE 20 MG: 20 TABLET ORAL at 07:58

## 2025-01-12 RX ADMIN — Medication 10 ML: at 21:46

## 2025-01-12 RX ADMIN — ALBUTEROL SULFATE 2.5 MG: 2.5 SOLUTION RESPIRATORY (INHALATION) at 04:02

## 2025-01-12 NOTE — NURSING NOTE
1744: Called Dr Stewart due to change in HR and maintaining in A-fib RVR with BP's remaining unstable.   1748: Spoke with Dr Stewart and new orders noted and obtained for medication.   1824: Once Pharmacy cleared the medication I was able to give the prescribed medication, nurse was able to give the digoxin.   1830: Once IV medication given patient on noted to drop from 160 to 144 with the digoxin and HR refracted and jumped back to 160's.   1835: Called and spoke with the charge nurse  1839: Called a rapid response to obtain staff at bedside. Please see Rapid Response documentation from the house supervisor for details.    1908: Obtained order from Dr Salcedo the cardiologist for adenosine. The House supervisor Miguel Ángel ROBLEDO at bedside and ready to assist in admin medication per policy with a stopcock: When ready to admin medication patient become non-complaint with course of treatment and changed her mind on the admin of life saving medication to  be given. Patient educated on the risk of maintaining in SVT and benefits of the course of treatment and what could happen if treatment was not provided and what could happen if treatment given. Patient stated she wanted to get her breathing treatment and wanted her ativan.   1931: After medication given patient agreed to allow further treatment and to then admin the prescribed medication. Adenosine given per policy with results lasting on for a few min.   Dr Siddiqui at bedside and new orders obtained and placed in the system.

## 2025-01-12 NOTE — PLAN OF CARE
Problem: Adult Inpatient Plan of Care  Goal: Plan of Care Review  Outcome: Progressing  Flowsheets (Taken 1/12/2025 1050)  Progress: improving  Plan of Care Reviewed With: patient  Goal: Patient-Specific Goal (Individualized)  Outcome: Progressing  Goal: Absence of Hospital-Acquired Illness or Injury  Outcome: Progressing  Intervention: Identify and Manage Fall Risk  Recent Flowsheet Documentation  Taken 1/12/2025 1000 by Roberto Apodaca RN  Safety Promotion/Fall Prevention:   activity supervised   nonskid shoes/slippers when out of bed   room organization consistent   safety round/check completed   clutter free environment maintained   fall prevention program maintained  Taken 1/12/2025 0730 by Roberto Apodaca RN  Safety Promotion/Fall Prevention:   activity supervised   nonskid shoes/slippers when out of bed   room organization consistent   safety round/check completed   clutter free environment maintained   fall prevention program maintained  Intervention: Prevent Skin Injury  Recent Flowsheet Documentation  Taken 1/12/2025 1000 by Roberto Apodaca RN  Body Position:   position changed independently   weight shifting  Taken 1/12/2025 0730 by Roberto Apodaca RN  Body Position:   position changed independently   weight shifting  Skin Protection:   incontinence pads utilized   transparent dressing maintained  Intervention: Prevent and Manage VTE (Venous Thromboembolism) Risk  Recent Flowsheet Documentation  Taken 1/12/2025 0730 by Roberto Apodaca RN  VTE Prevention/Management: (patient is on Eliquis) other (see comments)  Intervention: Prevent Infection  Recent Flowsheet Documentation  Taken 1/12/2025 1000 by Roberto Apodaca RN  Infection Prevention:   environmental surveillance performed   equipment surfaces disinfected   hand hygiene promoted   personal protective equipment utilized   rest/sleep promoted   single patient room provided  Taken 1/12/2025 0730 by Roberto Apodaca RN  Infection Prevention:    environmental surveillance performed   equipment surfaces disinfected   hand hygiene promoted   personal protective equipment utilized   rest/sleep promoted   single patient room provided  Goal: Optimal Comfort and Wellbeing  Outcome: Progressing  Intervention: Provide Person-Centered Care  Recent Flowsheet Documentation  Taken 1/12/2025 0730 by Roberto Apodaca RN  Trust Relationship/Rapport:   care explained   choices provided   emotional support provided   empathic listening provided   questions answered   questions encouraged   reassurance provided   thoughts/feelings acknowledged  Goal: Readiness for Transition of Care  Outcome: Progressing     Problem: Fall Injury Risk  Goal: Absence of Fall and Fall-Related Injury  Outcome: Progressing  Intervention: Identify and Manage Contributors  Recent Flowsheet Documentation  Taken 1/12/2025 1000 by Roberto Apodaca RN  Medication Review/Management:   medications reviewed   high-risk medications identified  Self-Care Promotion:   independence encouraged   BADL personal objects within reach   BADL personal routines maintained   adaptive equipment use encouraged  Taken 1/12/2025 0730 by Roberto Apodaca RN  Medication Review/Management:   medications reviewed   high-risk medications identified  Self-Care Promotion:   independence encouraged   BADL personal objects within reach   BADL personal routines maintained   adaptive equipment use encouraged  Intervention: Promote Injury-Free Environment  Recent Flowsheet Documentation  Taken 1/12/2025 1000 by Roberto Apodaca RN  Safety Promotion/Fall Prevention:   activity supervised   nonskid shoes/slippers when out of bed   room organization consistent   safety round/check completed   clutter free environment maintained   fall prevention program maintained  Taken 1/12/2025 0730 by Roberto Apodaca RN  Safety Promotion/Fall Prevention:   activity supervised   nonskid shoes/slippers when out of bed   room organization consistent    safety round/check completed   clutter free environment maintained   fall prevention program maintained     Problem: Pain Acute  Goal: Optimal Pain Control and Function  Outcome: Progressing  Intervention: Optimize Psychosocial Wellbeing  Recent Flowsheet Documentation  Taken 1/12/2025 0730 by Roberto Apodaca RN  Supportive Measures:   active listening utilized   self-responsibility promoted  Spiritual Activities Assistance:   affirmation provided   personal rituals encouraged  Intervention: Prevent or Manage Pain  Recent Flowsheet Documentation  Taken 1/12/2025 1000 by Roberto Apodaca RN  Medication Review/Management:   medications reviewed   high-risk medications identified  Taken 1/12/2025 0730 by Roberto Apodaca RN  Sensory Stimulation Regulation:   auditory stimulation minimized   care clustered   quiet environment promoted   tactile stimulation minimized   visual stimulation minimized  Sleep/Rest Enhancement:   awakenings minimized   consistent schedule promoted   natural light exposure provided   relaxation techniques promoted  Medication Review/Management:   medications reviewed   high-risk medications identified     Problem: Infection  Goal: Absence of Infection Signs and Symptoms  Outcome: Progressing  Intervention: Prevent or Manage Infection  Recent Flowsheet Documentation  Taken 1/12/2025 1000 by Roberto Apodaca RN  Infection Management: aseptic technique maintained  Taken 1/12/2025 0730 by Roberto Apodaca RN  Infection Management: aseptic technique maintained  Fever Reduction/Comfort Measures:   lightweight bedding   lightweight clothing   Goal Outcome Evaluation:  Plan of Care Reviewed With: patient        Progress: improving

## 2025-01-12 NOTE — PROGRESS NOTES
[facility] Medicine Services  PROGRESS NOTE    Patient Name: Kristen Jean  : 1960  MRN: [id number]    Date of Admission: 2025  Primary Care Physician: Moiz Nova MD    Subjective   Subjective     CC:  SOA    HPI:  Multiple calls last evening and overnight re: heart rate as well as blood pressure. Discussed with Dr. Salcedo as well as cross cover team.  See Dr. Preston's note.  Pt now rate controlled. Denies any complaints.     Objective   Objective     Vital Signs:   Temp:  [97.1 °F (36.2 °C)-99.1 °F (37.3 °C)] 99.1 °F (37.3 °C)  Heart Rate:  [] 80  Resp:  [16-24] 22  BP: ()/(32-72) 99/47  Flow (L/min) (Oxygen Therapy):  [3-4] 4     Physical Exam:  Constitutional: No acute distress, awake, alert, cachetic appearing WF in NAD  HENT: NCAT, mucous membranes moist  Respiratory: diffuse rhonchi bilaterally   Cardiovascular: RRR,  no murmurs, rubs, or gallops  Gastrointestinal: Positive bowel sounds, soft, nontender, nondistended  Musculoskeletal: No bilateral ankle edema  Psychiatric: Appropriate affect, cooperative  Neurologic: Oriented x 3, strength symmetric in all extremities, Cranial Nerves grossly intact to confrontation, speech clear  Skin: No rashes      Results Reviewed:  LAB RESULTS:      Lab 25  2135 25  1859 25  1842 25  0408 25  1807 25  1622   WBC  --  15.32*  --  13.89*  --  16.67*   HEMOGLOBIN  --  10.4*  --  9.6*  --  10.1*   HEMATOCRIT  --  32.1*  --  29.4*  --  30.4*   PLATELETS  --  687*  --  509*  --  493*   NEUTROS ABS  --   --   --  9.68*  --  12.88*   IMMATURE GRANS (ABS)  --   --   --  0.15*  --  0.09*   LYMPHS ABS  --   --   --  1.60  --  1.74   MONOS ABS  --   --   --  1.41*  --  1.50*   EOS ABS  --   --   --  0.99*  --  0.42*   MCV  --  82.5  --  81.2  --  79.8   SED RATE  --   --   --   --   --  113*   CRP  --   --   --   --  13.13*  --    PROCALCITONIN  --   --   --   --  0.09  --    HSTROP T  14*  --  12  --  12 11         Lab 01/11/25  1842 01/11/25  0408 01/09/25  1807 01/09/25  1622   SODIUM 132* 129*  --  127*   POTASSIUM 4.6  4.6 3.1*  --  3.4*   CHLORIDE 97* 93*  --  90*   CO2 25.0 27.0  --  27.0   ANION GAP 10.0 9.0  --  10.0   BUN 3* 3*  --  5*   CREATININE 0.26* 0.24*  --  0.31*   EGFR 123.6 126.0  --  118.4   GLUCOSE 115* 116*  --  131*   CALCIUM 8.8 8.6  --  9.1   MAGNESIUM 1.8 1.8  --   --    PHOSPHORUS 1.8*  --   --   --    HEMOGLOBIN A1C  --   --   --  6.00*   TSH  --   --  0.160*  --          Lab 01/11/25  1842 01/09/25  1622   TOTAL PROTEIN 5.9* 6.1   ALBUMIN 3.0* 3.2*   GLOBULIN 2.9 2.9   ALT (SGPT) 51* 21   AST (SGOT) 101* 29   BILIRUBIN <0.2 0.3   ALK PHOS 434* 201*   LIPASE  --  18         Lab 01/11/25  2135 01/11/25  1842 01/09/25  1807 01/09/25  1622   PROBNP  --   --   --  264.7   HSTROP T 14* 12 12 11         Lab 01/09/25  1807   CHOLESTEROL 144   LDL CHOL 88   HDL CHOL 41   TRIGLYCERIDES 74             Brief Urine Lab Results  (Last result in the past 365 days)        Color   Clarity   Blood   Leuk Est   Nitrite   Protein   CREAT   Urine HCG        01/10/25 0730 Yellow   Clear   Negative   Negative   Negative   Negative                   Microbiology Results Abnormal       None            No radiology results from the last 24 hrs        Current medications:  Scheduled Meds:Pharmacy Consult, , Not Applicable, BID  apixaban, 5 mg, Oral, Q12H  cefTRIAXone, 2,000 mg, Intravenous, Q24H  digoxin, 250 mcg, Intravenous, Q6H  dilTIAZem, 60 mg, Oral, Q8H  Fluticasone Furoate-Vilanterol, 1 puff, Inhalation, Daily - RT  ipratropium-albuterol, 3 mL, Nebulization, 4x Daily - RT  lactated ringers, 500 mL, Intravenous, Once  melatonin, 5 mg, Oral, Nightly  metoprolol tartrate, 25 mg, Oral, Q12H  minocycline, 100 mg, Oral, Q12H  pantoprazole, 40 mg, Oral, Nightly  predniSONE, 20 mg, Oral, Daily With Breakfast  sodium chloride, 10 mL, Intravenous, Q12H      Continuous Infusions:dilTIAZem, 5-15  mg/hr, Last Rate: 5 mg/hr (01/11/25 2037)  Pharmacy Consult,       PRN Meds:.  acetaminophen **OR** acetaminophen **OR** acetaminophen    Albuterol Sulfate NEB Orderable    benzonatate    senna-docusate sodium **AND** polyethylene glycol **AND** bisacodyl **AND** bisacodyl    Calcium Replacement - Follow Nurse / BPA Driven Protocol    Halls Cough Drops    Hydrocod Varun-Chlorphe Varun ER    LORazepam    Magnesium Standard Dose Replacement - Follow Nurse / BPA Driven Protocol    nitroglycerin    Pharmacy Consult    Phosphorus Replacement - Follow Nurse / BPA Driven Protocol    Potassium Replacement - Follow Nurse / BPA Driven Protocol    sodium chloride    sodium chloride    sodium chloride    Assessment & Plan   Assessment & Plan     Active Hospital Problems    Diagnosis  POA    **Multifocal pneumonia [J18.9]  Yes    Severe protein-calorie malnutrition [E43]  Yes    Atrial fibrillation/flutter [I48.91]  Yes    Heart failure with improved ejection fraction (HFimpEF) [I50.32]  Yes    Aortic atherosclerosis [I70.0]  Yes    Emphysema of lung [J43.9]  Yes    Asthma-COPD overlap syndrome [J44.89]  Yes    Anxiety disorder [F41.9]  Yes    Hyponatremia [E87.1]  Yes    Hypokalemia [E87.6]  Yes    Protein calorie malnutrition [E46]  Yes    Cachexia [R64]  Yes    End stage COPD [J44.9]  Yes    Hypothyroidism (acquired) [E03.9]  Yes    Mycobacterium avium complex [A31.0]  Yes    Chronic bronchitis [J42]  Yes    Mycobacterium infection [A31.9]  Yes      Resolved Hospital Problems    Diagnosis Date Resolved POA    Pneumonia [J18.9] 01/11/2025 Yes    T2DM (type 2 diabetes mellitus) [E11.9] 01/11/2025 Yes        Brief Hospital Course to date:  Kristen Jean is a 64 y.o. female  with a PMH significant for pulmonary Mycobacterium avium complex diagnosed 2015, end-stage COPD, COPD/asthma overlap syndrome, advanced emphysema, anxiety and protein calorie malnutrition with COPD cachexia who presented to [facility] ED secondary  to worsening shortness of breath, higher oxygen requirements, right-sided pleuritic chest pain, pain in lower chest on the right side and was found to have multifocal PNA.    This patient's problems and plans were partially entered by my partner and updated as appropriate by me 01/12/25.    Plan:     Pneumonia  Multifocal pneumonia  Mycobacterium avium complex  End-stage COPD  Asthma - COPD overlap syndrome  Emphysema of the lung  Chronic bronchitis  Patient has a history that is significant for advanced COPD with a history of asthma - COPD overlap syndrome, follows [facility] pulmonology  - Does have a history of Mycobacterium AVM complex, appears on CT that her previous RLL nodule has decreased in size  - CT chest without contrast revealed multifocal pneumonia more pronounced RML/RLL  - Management of pneumonia does become somewhat complicated due to underlying MAC, patient is currently on rifampin as outpatient with recent fill history and azithromycin  - MRSA swab, streptococcal Legionella urine antigens negative, respiratory culture PENDING   -- respiratory viral PCR panel negative  - Consulted ID,  will require guidance on antimicrobial regimen and duration. Appreciate Dr. Wasserman's assistance. Continue Rocephin/Minocycline per his recs.   - Consulted pulmonology due to extent of pulmonary disease  - DuoNebs, Tessalon Perles and albuterol inhaler  - Continue nasal cannula oxygen and wean as tolerated     Hyponatremia  Hypokalemia  Suspect SIADH in the setting of pneumonia versus hypovolemic hyponatremia  - s/p IVFs  -- Na+ better today  - Potassium replacement protocol in place    New Onset Afib with RVR  ?SVT  -- started dilt gtt, continue as BP allows  -- consult Cardiology given new diagnosis, appreciate their assistance  -- started on Eliquis, [id number]  -- Cards ordered Metoprolol but unable to tolerate due to hypotension  -- given IV digoxin but remained tachycardic   -- EKG repeated and concern  for SVT   -- pt refused IV Adenosine initially last evening, however, eventually agreed. Briefly HR was better, but then went back to 160s.  Resumed dilt gtt at that point.   -- now off dilt gtt and in NSR  -- TTE PENDING as per Cards      Protein calorie malnutrition  Cachexia of end-stage lung disease  Patient has low albumin, appears cachectic likely secondary to advanced lung disease  - Nutrition consult for assessment of malnutrition     Goals of care  -- pt still full code for time being.  Will continue to discuss with her given her poor overall prognosis      Low TSH  -- Likely subclinical thyroiditis with low TSH and normal free T4, in the setting of pneumonia  -- Will need repeat of thyroid function in 6 to 8 weeks.     T2DM/prediabetes   HbA1c 6%   LDL 88    Anxiety  --  reordered her benzo per home meds/fill record (reports that she takes 1/2 of her prescribed med. I.e. 0.25 mg BID)      Total time spent: Time Spent: Time Spent: 35 minutes  Time spent includes time reviewing chart, face-to-face time, counseling patient/family/caregiver, ordering medications/tests/procedures, communicating with other health care professionals, documenting clinical information in the electronic health record, and coordination of care.      Expected Discharge Location and Transportation: home   Expected Discharge   Expected Discharge Date: 1/14/2025; Expected Discharge Time:      VTE Prophylaxis:  Pharmacologic & mechanical VTE prophylaxis orders are present.         AM-PAC 6 Clicks Score (PT): 22 (01/11/25 2000)    CODE STATUS:   Code Status and Medical Interventions: CPR (Attempt to Resuscitate); Full Support   Ordered at: 01/09/25 2009     Level Of Support Discussed With:    Patient     Code Status (Patient has no pulse and is not breathing):    CPR (Attempt to Resuscitate)     Medical Interventions (Patient has pulse or is breathing):    Full Support       Franchesca Stewart MD  01/12/25

## 2025-01-12 NOTE — SIGNIFICANT NOTE
Late entry    Received signout at shift change for rapid response and tachycardia.  Patient with narrow complex rate in 160s that appeared to be either SVT or atrial tachycardia.  She had received digoxin, had difficulty tolerating AV nasir blockers earlier due to blood pressure.  Cardiology following case was discussed with Dr. Salcedo who had recommended adenosine.  I was present at bedside while adenosine was given which resulted in a brief pause followed by about 10 seconds of sinus rhythm with heart rate in the 70s, then return to rate 160s.    Blood pressure at the time much better than prior.  Gave small fluid bolus, IV calcium, IV magnesium and reinitiated diltiazem drip.  Discussed with Dr. Salcedo and main suspicion at this point is for atrial tachycardia related to her pulmonary disease.  Her rate currently appears to be much better and is blood pressure is tolerating diltiazem.  Will continue to monitor.    Diaz Preston MD

## 2025-01-12 NOTE — PROGRESS NOTES
INFECTIOUS DISEASE Progress Note    Kristen Jean  1960  [id number]    Admission Date: 1/9/2025      Requesting Provider: Rosi Medina MD  Evaluating Physician: Az Wasserman MD    Reason for Consultation: Multifocal pneumonia with h/o MAC on chronic therapy    History of present illness:     1/10/2025:Patient is a 64 y.o. female with pulmonary Mycobacterium avium infection 2015/on chronic therapy, ES COPD/3L O2 baseline, and protein calorie malnutrition who presented to [facility] ED on 1/9/25 with worsening shortness of breath with right pleuritic chest pain, and higher oxygen requirements over the last week prior to admission. About 2 days ago, the patient bent over and she felt like she tore something in her right lower chest.  She quit smoking 11/2024, but all her neighbors smoke in the apartment complex.  The past week, she has needed 5-6 L/min O2 and is still satting at 93% on this oxygen setting.  She follow pulmonology and ID at [facility].  She has been noncompliant with her antibiotics for BONI and rotating taking Levaquin, Azithromycin and Rifampin because of complaints of side effects.   She denies fever, chills, nausea, vomiting, diarrhea, or dysuria.  She has remained afebrile with initial tachycardia. Initial labs were , PCT 0.09, WBC 16,700 with 77% neutrophils, Na 127, K 3.4, creatinine 0.31, CRP 13.13, and CPK 52.  A respiratory panel PCR was negative.  Urinary antigens for Strep pneumo and Legionella were negative. Nasal MRSA PCR is negative. A CT scan of chest without contrast showed right lung multifocal pneumonia, decrease in size of RLL pulmonary nodule.  She is currently on Merrem and Zyvox.  ID was asked to evaluate and manage her antibiotic therapy.    She has been noncompliant with follow-up.  Her last ID visit at  was in 5/24.  She has been noncompliant with her azithromycin, rifampin, and Levaquin as she takes these medications intermittently.  She states that she  has not received an RSV vaccine, Prevnar 20 pneumococcal vaccine, influenza vaccine, or COVID-19 vaccine booster     1/11/2025:  She has remained afebrile.  White blood cell count is 13.9.   MRSA nasal PCR was negative.  Urinary Legionella and pneumococcal antigens were negative.  O2 saturation is 90% on 3 L.    He complains of pleuritic right chest pain.  She complains of dyspnea.  She has minimal sputum production.   she refused taking the doxycycline due to prior nausea on doxycycline.  She is agreeable to trying minocycline.  She will need to take the minocycline with food but not any calcium containing food within 1 to 2 hours of the capsules.  I discussed this issue with her today in detail     1/12/2025: She remains afebrile. White blood cell count is 11.6.   She feels better today.  She has decreased right pleuritic chest pain.  She denies increased cough and sputum production.  She denies nausea vomiting. She denies increased dyspnea.  She is tolerating the minocycline with no nausea.    Past Medical History:   Diagnosis Date    Anxiety disorder 01/09/2025    Asthma-COPD overlap syndrome 01/09/2025    Breast injury     Cachexia 01/09/2025    Chronic bronchitis 08/11/2016    Emphysema of lung 01/09/2025    Hypothyroidism (acquired) 01/09/2025    Mycobacterium infection 08/11/2016    T2DM (type 2 diabetes mellitus) 01/09/2025       Past Surgical History:   Procedure Laterality Date    TOOTH EXTRACTION      1990 and 2002       Family History   Problem Relation Age of Onset    Dementia Father     Breast cancer Neg Hx     Ovarian cancer Neg Hx        Social History     Socioeconomic History    Marital status: Single   Tobacco Use    Smoking status: Every Day   Vaping Use    Vaping status: Never Used   Substance and Sexual Activity    Alcohol use: No    Drug use: Never    Sexual activity: Defer       Allergies   Allergen Reactions    Codeine     Fluticasone-Salmeterol     Penicillins           Medication:    Current Facility-Administered Medications:     !!! Please obtain patients home medications that are stored in central pharmacy (including Breo inhaler in [facility]) and return to patient prior to discharge!, , Not Applicable, BID, Niocle Ch, PharmD, Given at 01/10/25 2337    acetaminophen (TYLENOL) tablet 650 mg, 650 mg, Oral, Q4H PRN **OR** acetaminophen (TYLENOL) 160 MG/5ML oral solution 650 mg, 650 mg, Oral, Q4H PRN **OR** acetaminophen (TYLENOL) suppository 650 mg, 650 mg, Rectal, Q4H PRN, Rosi Medina MD    albuterol (PROVENTIL) nebulizer solution 0.083% 2.5 mg/3mL, 2.5 mg, Nebulization, Q4H PRN, Luis Lanier MD, 2.5 mg at 01/12/25 0402    apixaban (ELIQUIS) tablet 5 mg, 5 mg, Oral, Q12H, Wayne Salcedo IV, MD, 5 mg at 01/12/25 0759    benzonatate (TESSALON) capsule 100 mg, 100 mg, Oral, TID PRN, Rosi Medina MD, 100 mg at 01/12/25 0758    sennosides-docusate (PERICOLACE) 8.6-50 MG per tablet 2 tablet, 2 tablet, Oral, BID PRN **AND** polyethylene glycol (MIRALAX) packet 17 g, 17 g, Oral, Daily PRN **AND** bisacodyl (DULCOLAX) EC tablet 5 mg, 5 mg, Oral, Daily PRN **AND** bisacodyl (DULCOLAX) suppository 10 mg, 10 mg, Rectal, Daily PRN, Rosi Medina MD    Calcium Replacement - Follow Nurse / BPA Driven Protocol, , Not Applicable, PRN, Rosi Medina MD    cefTRIAXone (ROCEPHIN) 2,000 mg in sodium chloride 0.9 % 100 mL MBP, 2,000 mg, Intravenous, Q24H, Anthony Hodges PA, Last Rate: 200 mL/hr at 01/11/25 1745, 2,000 mg at 01/11/25 1745    dilTIAZem (CARDIZEM) 125 mg in 125 mL D5W infusion, 5-15 mg/hr, Intravenous, Continuous, Diaz Preston MD, Last Rate: 5 mL/hr at 01/11/25 2037, 5 mg/hr at 01/11/25 2037    dilTIAZem (CARDIZEM) tablet 60 mg, 60 mg, Oral, Q8H, Wayne Salcedo IV, MD, 60 mg at 01/11/25 2347    Fluticasone Furoate-Vilanterol (BREO ELLIPTA) 200-25 MCG/ACT inhaler 1 puff **Patient Supplied Med**, 1 puff, Inhalation,  Daily - RT, Dorian Sinha, [facility], 1 puff at 01/10/25 1930    Halls Cough Drops (lozenges), 1 lozenge, Buccal, Q2H PRN, Marlen Blevins PA-C, 1 lozenge at 01/11/25 1754    Hydrocod Varun-Chlorphe Varun ER (TUSSIONEX PENNKINETIC) 10-8 MG/5ML ER suspension 2.5 mL, 2.5 mL, Oral, Q12H PRN, Marlen Blevins PA-C, 2.5 mL at 01/11/25 0212    ipratropium-albuterol (DUO-NEB) nebulizer solution 3 mL, 3 mL, Nebulization, 4x Daily - RT, Rosi Medina MD, 3 mL at 01/12/25 0901    lactated ringers bolus 500 mL, 500 mL, Intravenous, Once, Rosi Medina MD    LORazepam (ATIVAN) tablet 0.25 mg, 0.25 mg, Oral, BID PRN, Franchesca Stewart MD, 0.25 mg at 01/12/25 0759    Magnesium Standard Dose Replacement - Follow Nurse / BPA Driven Protocol, , Not Applicable, PRN, Rosi Medina MD    melatonin tablet 5 mg, 5 mg, Oral, Nightly, Rosi Medina MD    metoprolol tartrate (LOPRESSOR) tablet 25 mg, 25 mg, Oral, Q12H, Wayne Salcedo IV, MD, 25 mg at 01/12/25 0759    minocycline (MINOCIN,DYNACIN) capsule 100 mg, 100 mg, Oral, Q12H, Az Wasserman MD, 100 mg at 01/12/25 0800    nitroglycerin (NITROSTAT) SL tablet 0.4 mg, 0.4 mg, Sublingual, Q5 Min PRN, Rosi Medina MD    pantoprazole (PROTONIX) EC tablet 40 mg, 40 mg, Oral, Nightly, Rosi Medina MD, 40 mg at 01/11/25 2045    Pharmacy Consult, , Not Applicable, Continuous PRN, Luis Lanier MD    Phosphorus Replacement - Follow Nurse / BPA Driven Protocol, , Not Applicable, PRN, Rosi Medina MD    Potassium Replacement - Follow Nurse / BPA Driven Protocol, , Not Applicable, PRN, Rosi Medina MD    predniSONE (DELTASONE) tablet 20 mg, 20 mg, Oral, Daily With Breakfast, Luis Lanier MD, 20 mg at 01/12/25 0758    sodium chloride 0.9 % flush 10 mL, 10 mL, Intravenous, PRN, Rosi Medina MD    sodium chloride 0.9 % flush 10 mL, 10 mL, Intravenous, Q12H, Rosi Medina MD, 10 mL at 01/12/25 0800    sodium chloride  0.9 % flush 10 mL, 10 mL, Intravenous, PRN, Rosi Medina MD    sodium chloride 0.9 % infusion 40 mL, 40 mL, Intravenous, PRN, Rosi Medina MD    Antibiotics:  Anti-Infectives (From admission, onward)      Ordered     Dose/Rate Route Frequency Start Stop    01/11/25 1011  minocycline (MINOCIN,DYNACIN) capsule 100 mg        Ordering Provider: Az Wasserman MD    100 mg Oral Every 12 Hours Scheduled 01/11/25 1100 01/21/25 0859    01/10/25 1534  doxycycline (MONODOX) capsule 100 mg  Status:  Discontinued        Ordering Provider: Anthony Hodges PA    100 mg Oral Every 12 Hours Scheduled 01/10/25 2100 01/11/25 1010    01/10/25 1534  cefTRIAXone (ROCEPHIN) 2,000 mg in sodium chloride 0.9 % 100 mL MBP        Ordering Provider: Anthony Hodges PA    2,000 mg  200 mL/hr over 30 Minutes Intravenous Every 24 Hours 01/10/25 1800 01/24/25 1759    01/09/25 2014  meropenem (MERREM) 1,000 mg in sodium chloride 0.9 % 100 mL MBP  Status:  Discontinued        Ordering Provider: Rosi Medina MD    1,000 mg  over 3 Hours Intravenous Every 8 Hours 01/10/25 0600 01/10/25 1534    01/09/25 2014  Linezolid (ZYVOX) 600 mg 300 mL  Status:  Discontinued        Ordering Provider: Rosi Medina MD    600 mg  300 mL/hr over 60 Minutes Intravenous Every 12 Hours 01/10/25 0500 01/11/25 0846    01/09/25 2014  meropenem (MERREM) 1,000 mg in sodium chloride 0.9 % 100 mL MBP        Ordering Provider: Rosi Medina MD    1,000 mg  over 30 Minutes Intravenous Once 01/10/25 0000 01/10/25 0153    01/09/25 1717  Linezolid (ZYVOX) 600 mg 300 mL        Ordering Provider: Ezequiel Eaton DO    600 mg  300 mL/hr over 60 Minutes Intravenous Once 01/09/25 1733 01/09/25 1843    01/09/25 1717  cefepime 2000 mg IVPB in 100 mL NS (MBP)        Ordering Provider: Ezequiel Eaton, DO    2,000 mg  over 30 Minutes Intravenous Once 01/09/25 1733 01/09/25 1817              Review of Systems:  See HPI      Physical Exam:   Vital  Signs  Temp (24hrs), Av.1 °F (37.3 °C), Min:99.1 °F (37.3 °C), Max:99.1 °F (37.3 °C)    Temp  Min: 99.1 °F (37.3 °C)  Max: 99.1 °F (37.3 °C)  BP  Min: 86/39  Max: 155/72  Pulse  Min: 62  Max: 167  Resp  Min: 18  Max: 24  SpO2  Min: 78 %  Max: 97 %    GENERAL: Ill and cachectic appearing.  HEENT: Normocephalic, atraumatic.  PERRL. EOMI. No conjunctival injection. No icterus. Oropharynx clear without evidence of thrush or exudate.    NECK: Supple   HEART: RRR; No murmur, rubs, gallops.   LUNGS:   Bilateral right greater than left crackles  ABDOMEN: Soft, nontender, nondistended. No rebound or guarding. NO mass or HSM.  EXT:  No cyanosis, clubbing or edema. No cord.  :  Without Ocasio catheter.  MSK: No joint effusions or erythema  SKIN: Warm and dry without cutaneous eruptions on Inspection/palpation.    NEURO: Oriented to PPT.  Motor 5/5 strength  PSYCHIATRIC: Normal insight and judgment. Cooperative with PE    Laboratory Data    Results from last 7 days   Lab Units 25  0554 25  1859 25  0408   WBC 10*3/mm3 11.64* 15.32* 13.89*   HEMOGLOBIN g/dL 9.4* 10.4* 9.6*   HEMATOCRIT % 28.8* 32.1* 29.4*   PLATELETS 10*3/mm3 648* 687* 509*     Results from last 7 days   Lab Units 25  0555   SODIUM mmol/L 134*   POTASSIUM mmol/L 4.2   CHLORIDE mmol/L 98   CO2 mmol/L 29.0   BUN mg/dL 3*   CREATININE mg/dL 0.19*   GLUCOSE mg/dL 99   CALCIUM mg/dL 8.8     Results from last 7 days   Lab Units 25  1842   ALK PHOS U/L 434*   BILIRUBIN mg/dL <0.2   ALT (SGPT) U/L 51*   AST (SGOT) U/L 101*     Results from last 7 days   Lab Units 25  1622   SED RATE mm/hr 113*     Results from last 7 days   Lab Units 25  1807   CRP mg/dL 13.13*         Results from last 7 days   Lab Units 25  1807   CK TOTAL U/L 52         Estimated Creatinine Clearance: 224.9 mL/min (A) (by C-G formula based on SCr of 0.19 mg/dL (L)).      Microbiology:  Microbiology Results (last 10 days)       Procedure Component  Value - Date/Time    MRSA Screen, PCR (Inpatient) - Swab, Nares [[id number]]  (Normal) Collected: 01/10/25 1435    Lab Status: Final result Specimen: Swab from Nares Updated: 01/10/25 1607     MRSA PCR Negative    Narrative:      The negative predictive value of this diagnostic test is high and should only be used to consider de-escalating anti-MRSA therapy. A positive result may indicate colonization with MRSA and must be correlated clinically.  MRSA Negative    S. Pneumo Ag Urine or CSF - Urine, Urine, Clean Catch [[id number]]  (Normal) Collected: 01/10/25 0729    Lab Status: Final result Specimen: Urine, Clean Catch Updated: 01/10/25 1353     Strep Pneumo Ag Negative    Legionella Antigen, Urine - Urine, Urine, Clean Catch [[id number]]  (Normal) Collected: 01/10/25 0729    Lab Status: Final result Specimen: Urine, Clean Catch Updated: 01/10/25 1353     LEGIONELLA ANTIGEN, URINE Negative    Respiratory Panel PCR w/COVID-19(SARS-CoV-2) MARIANELA/BRONWYN/LUZ MARINA/PAD/COR/KINGS In-House, NP Swab in UTM/VTM, 2 HR TAT - Swab, Nasopharynx [[id number]]  (Normal) Collected: 01/10/25 0133    Lab Status: Final result Specimen: Swab from Nasopharynx Updated: 01/10/25 0238     ADENOVIRUS, PCR Not Detected     Coronavirus 229E Not Detected     Coronavirus HKU1 Not Detected     Coronavirus NL63 Not Detected     Coronavirus OC43 Not Detected     COVID19 Not Detected     Human Metapneumovirus Not Detected     Human Rhinovirus/Enterovirus Not Detected     Influenza A PCR Not Detected     Influenza B PCR Not Detected     Parainfluenza Virus 1 Not Detected     Parainfluenza Virus 2 Not Detected     Parainfluenza Virus 3 Not Detected     Parainfluenza Virus 4 Not Detected     RSV, PCR Not Detected     Bordetella pertussis pcr Not Detected     Bordetella parapertussis PCR Not Detected     Chlamydophila pneumoniae PCR Not Detected     Mycoplasma pneumo by PCR Not Detected    Narrative:      In the setting of a positive respiratory panel with a viral  infection PLUS a negative procalcitonin without other underlying concern for bacterial infection, consider observing off antibiotics or discontinuation of antibiotics and continue supportive care. If the respiratory panel is positive for atypical bacterial infection (Bordetella pertussis, Chlamydophila pneumoniae, or Mycoplasma pneumoniae), consider antibiotic de-escalation to target atypical bacterial infection.                  Radiology:  Imaging Results (Last 72 Hours)       Procedure Component Value Units Date/Time    CT Chest Without Contrast Diagnostic [[id number]] Collected: 01/09/25 1746     Updated: 01/09/25 1810    Narrative:      CT CHEST WO CONTRAST DIAGNOSTIC    Date of Exam: 1/9/2025 5:35 PM EST    Indication: cough, wheezing, right sided pain with HX of MAC.    Comparison: 9/20/2022    Technique: Axial CT images were obtained of the chest without contrast administration.  Reconstructed coronal and sagittal images were also obtained. Automated exposure control and iterative construction methods were used.      Findings:  MEDIASTINUM: Unremarkable. Aortic and heart size are normal. No mass nor pericardial effusion.  CORONARY ARTERIES: No calcified atherosclerotic disease.  LUNGS: There is inferior right upper lobe and right middle lobe airspace disease consistent multifocal pneumonia. This is superimposed on a background pattern of emphysema and partially calcified opacities in both lungs which are similar. Previous 12 mm   right lower lobe nodule now measures 8 mm. No new/suspicious nodule is identified.  PLEURAL SPACE: No effusion, mass, nor pneumothorax.  LYMPH NODES: There are no pathologically enlarged lymph nodes.    UPPER ABDOMEN: Unremarkable    OSSEOUS STRUCTURES: Appropriate for age with no acute process identified.          Impression:      Impression:  1.Right lung multifocal pneumonia  2.Decrease in size of right lower lobe pulmonary nodule. No new suspicious nodule identified.  3.Other  chronic findings as discussed.        Electronically Signed: Gustavo Proctor MD    1/9/2025 6:07 PM EST    Workstation ID: [id number]    XR Chest 1 View [[id number]] Collected: 01/09/25 1710     Updated: 01/09/25 1717    Narrative:      XR CHEST 1 VW    Date of Exam: 1/9/2025 4:50 PM EST    Indication: Chest Pain Triage Protocol    Comparison: CT chest dated 9/20/2022    Findings:  The cardiomediastinal silhouette is within normal limits. Pulmonary vascularity appears normal. The lungs are hyperinflated with with underlying COPD/emphysema. There are interstitial opacities within the right lung likely representing infectious or   inflammatory process. There is biapical scarring. There is a bleb within the superior lateral aspect of the right upper lobe with surrounding thickened wall. This is new from prior exam. There is no pleural effusion.      Impression:      Impression:  1.Hyperinflated lungs with underlying COPD/emphysema.  2.Interstitial opacities within the right lung likely representing infectious or inflammatory process.  3.New bleb within the superior lateral aspect of the right upper lobe with surrounding thickening of the wall.        Electronically Signed: Pito Hutton    1/9/2025 5:14 PM EST    Workstation ID: [id number]         I read her radiographic images.      Impression:   Multifocal pneumonia- this is likely a bacterial pneumonia superimposed on her BONI.  I will leave her on intravenous Rocephin and oral minocycline.  Mycobacterium avium complex pulmonary infection -since 2016,  with therapeutic noncompliance.  She has been rotating Levaquin, azithromycin, and rifampin d/t side effects.  She is not compliant with ID follow up. She is at high risk for developing a resistant BONI strain that will make it difficult to treat in the future.   Leukocytosis/neutrophilia  End stage chronic obstructive pulmonary disease/on 3L O2 baseline at home  Protein calorie malnutrition  Penicillin list allergy.  Seems  to tolerate cephalosporins.  Medical noncompliance-this complicates all aspects of her care and increases her risk for poor outcome.     PLAN/RECOMMENDATIONS:   Rocephin 2 GM IV daily  Minocycline 100 mg p.o. twice daily  Continue O2 support  Prevnar 20 pneumococcal vaccination, influenza vaccination, RSV vaccination, and COVID-19 vaccine booster in the near future  Infectious disease follow-up at [facility] have advised her to be compliant with her follow-up appointments    This visit included the following complex service elements:  Complex medical decision-making associated with antimicrobial prescribing.  In-depth chart review with high level synthesis for complex diagnoses.  Managed infection treatment protocol associated with transitions of care for this complex patient.  Counseled patients, family members, and/or caregivers regarding antimicrobial stewardship and antibiotic resistance.       Az Wasserman MD  1/12/2025  09:53 EST

## 2025-01-12 NOTE — PROGRESS NOTES
Cardiology update:    I have been contacted by Dr. Stewart as well as Fidel Navarro of the hospitalist service over the last several hours regarding this patient.  Initially, ventricular rate and atrial fibrillation had increased in the 120-130 bpm range and blood pressure too soft to administer diltiazem or oral metoprolol.  I recommended IV digoxin which has been given.  IV fluids also administered.  In the last 20 minutes, rapid response was called on the patient for narrow complex tachycardia at 170 bpm which appears regular.  I reviewed EKG and agree possibly SVT.  I recommended patient receive adenosine 12 mg to see if it would terminate SVT.  Patient refused.    The patient evidently is having increased respiratory rate.  Oxygen saturation on 3 L remains around 92%, which I told is baseline.    At this point, I believe her heart rate issues are being exacerbated by respiratory status.  I have low threshold for her being transferred to the ICU for greater respiratory support.  I will defer this to hospitalist service, who have been treating her for end-stage COPD and multifocal pneumonia.    KRISTINE Salcedo MD [facility], [facility]  Interventional and General Cardiology

## 2025-01-12 NOTE — PLAN OF CARE
Problem: Adult Inpatient Plan of Care  Goal: Absence of Hospital-Acquired Illness or Injury  Intervention: Identify and Manage Fall Risk  Recent Flowsheet Documentation  Taken 1/11/2025 2000 by Kathleen Hurtado RN  Safety Promotion/Fall Prevention:   activity supervised   clutter free environment maintained   nonskid shoes/slippers when out of bed   safety round/check completed  Intervention: Prevent Skin Injury  Recent Flowsheet Documentation  Taken 1/11/2025 2000 by Kathleen Hurtado RN  Body Position: position changed independently  Skin Protection: silicone foam dressing in place  Intervention: Prevent Infection  Recent Flowsheet Documentation  Taken 1/11/2025 2000 by Kathleen Hurtado RN  Infection Prevention:   environmental surveillance performed   rest/sleep promoted  Goal: Optimal Comfort and Wellbeing  Intervention: Provide Person-Centered Care  Recent Flowsheet Documentation  Taken 1/11/2025 2000 by Kathleen Hurtado RN  Trust Relationship/Rapport:   care explained   choices provided   questions answered   questions encouraged     Problem: Fall Injury Risk  Goal: Absence of Fall and Fall-Related Injury  Intervention: Promote Injury-Free Environment  Recent Flowsheet Documentation  Taken 1/11/2025 2000 by Kathleen Hurtado RN  Safety Promotion/Fall Prevention:   activity supervised   clutter free environment maintained   nonskid shoes/slippers when out of bed   safety round/check completed     Problem: Pain Acute  Goal: Optimal Pain Control and Function  Intervention: Optimize Psychosocial Wellbeing  Recent Flowsheet Documentation  Taken 1/11/2025 2000 by Kathleen Hurtado RN  Supportive Measures: active listening utilized  Intervention: Prevent or Manage Pain  Recent Flowsheet Documentation  Taken 1/11/2025 2000 by Kathleen Hurtado RN  Sensory Stimulation Regulation:   auditory stimulation minimized   lighting decreased   care clustered  Sleep/Rest Enhancement:   awakenings minimized   room darkened   Goal  Outcome Evaluation:

## 2025-01-12 NOTE — PROGRESS NOTES
Cardiology Progress Note      Primary cardiologist: Dr. Miquel Post    Reason for visit:    Atrial fibrillation/flutter  SVT  Systolic heart failure with improved LVEF    IDENTIFICATION: 64-year-old female who resides in [place], [place]    Active Hospital Problems    Diagnosis  POA    **Multifocal pneumonia [J18.9]  Yes     Priority: High    Atrial tachycardia [I47.19]  No     Priority: High     Episode of SVT 1/11/2025 in the setting of multifocal pneumonia.  IV adenosine recommended but patient refused.  Converted spontaneously      Atrial fibrillation/flutter [I48.91]  Yes     Priority: High     First time diagnosis in the setting of multifocal pneumonia, 1/11/2025  [id number] 3 (female, history of CHF, aortic athero)      Emphysema of lung [J43.9]  Yes     Priority: High    End stage COPD [J44.9]  Yes     Priority: High    Mycobacterium avium complex [A31.0]  Yes     Priority: High    Heart failure with improved ejection fraction (HFimpEF) [I50.32]  Yes     Priority: Medium     Echo at  (1/18/2024): LVEF 38% with wall motion abnormality consistent with Takotsubo (stress) cardiomyopathy  Reported normalization of LV systolic function on echo performed by Miquel Post at [facility]      Aortic atherosclerosis [I70.0]  Yes     Priority: Medium     Aortic atherosclerosis noted on chest CT, 1/2025      Chronic bronchitis [J42]  Yes     Priority: Medium    Asthma-COPD overlap syndrome [J44.89]  Yes     Priority: Low    Anxiety disorder [F41.9]  Yes     Priority: Low    Hyponatremia [E87.1]  Yes     Priority: Low    Hypokalemia [E87.6]  Yes     Priority: Low    Cachexia [R64]  Yes     Priority: Low    Hypothyroidism (acquired) [E03.9]  Yes     Priority: Low    Severe protein-calorie malnutrition [E43]  Yes    Protein calorie malnutrition [E46]  Yes    Mycobacterium infection [A31.9]  Yes            Patient is converted back to sinus rhythm with IV and now oral diltiazem  Patient appears  comfortable and feels better           Vital Sign Min/Max for last 24 hours  Temp  Min: 99.1 °F (37.3 °C)  Max: 99.1 °F (37.3 °C)   BP  Min: 86/39  Max: 155/72   Pulse  Min: 62  Max: 167   Resp  Min: 18  Max: 24   SpO2  Min: 78 %  Max: 97 %   Flow (L/min) (Oxygen Therapy)  Min: 3  Max: 4      Intake/Output Summary (Last 24 hours) at 1/12/2025 0953  Last data filed at 1/12/2025 0700  Gross per 24 hour   Intake 600 ml   Output 2475 ml   Net -1875 ml           Physical Exam  Cardiovascular:      Rate and Rhythm: Normal rate and regular rhythm.   Pulmonary:      Effort: Pulmonary effort is normal.   Neurological:      Mental Status: She is alert.         Tele: Normal sinus rhythm    Results Review (reviewed the patient's recent labs in the electronic medical record):     EKG (1/12/2025): Normal sinus rhythm with sinus arrhythmia    CT of the chest (1/9/2025): Right lung multifocal pneumonia.  Decreased right lower lobe pulmonary nodule.    Echo (1/12/2025): LVEF >70%.  No significant valvular abnormality    Results from last 7 days   Lab Units 01/12/25  0555 01/11/25  1842 01/11/25  0408 01/09/25  1622   SODIUM mmol/L 134* 132* 129* 127*   POTASSIUM mmol/L 4.2 4.6  4.6 3.1* 3.4*   CHLORIDE mmol/L 98 97* 93* 90*   BUN mg/dL 3* 3* 3* 5*   CREATININE mg/dL 0.19* 0.26* 0.24* 0.31*   MAGNESIUM mg/dL 2.2 1.8 1.8  --        Results from last 7 days   Lab Units 01/11/25  2135 01/11/25  1842 01/09/25  1807   HSTROP T ng/L 14* 12 12       Results from last 7 days   Lab Units 01/12/25  0554 01/11/25  1859 01/11/25  0408   WBC 10*3/mm3 11.64* 15.32* 13.89*   HEMOGLOBIN g/dL 9.4* 10.4* 9.6*   HEMATOCRIT % 28.8* 32.1* 29.4*   PLATELETS 10*3/mm3 648* 687* 509*       Lab Results   Component Value Date    HGBA1C 6.00 (H) 01/09/2025       Lab Results   Component Value Date    CHOL 144 01/09/2025    TRIG 74 01/09/2025    HDL 41 01/09/2025    LDL 88 01/09/2025                  Atrial fibrillation/flutter  First time diagnosis in the  setting of pneumonia  Noted on apixaban this admission  Replete potassium >4 and magnesium >2  Loaded with digoxin this admission 500 mcg IV x 1, followed by 250 mcg IV every 6 hours x 2 doses  Arrhythmia responded well to IV and oral diltiazem  Transition short acting diltiazem to long-acting tomorrow     History of stress-induced cardiomyopathy  History of LVEF 38% in the setting of pneumonia 1/2024  Reported normalization of LV systolic function on subsequent echo at [facility]  Repeat echo is pending  proBNP and chest CT do not suggest heart failure observation.                   Continue diltiazem 60 mg 3 times daily.  Transition to diltiazem  tomorrow  Will need to follow-up with primary cardiologist Dr. Miquel Post in 2 to 3 weeks.  Consider Holter monitoring at that time    Electronically signed by Wayne Salcedo IV, MD, 01/12/25, 3:00 PM EST.

## 2025-01-13 LAB
ANION GAP SERPL CALCULATED.3IONS-SCNC: 6 MMOL/L (ref 5–15)
BASOPHILS # BLD AUTO: 0.05 10*3/MM3 (ref 0–0.2)
BASOPHILS NFR BLD AUTO: 0.4 % (ref 0–1.5)
BUN SERPL-MCNC: 4 MG/DL (ref 8–23)
BUN/CREAT SERPL: 15.4 (ref 7–25)
CALCIUM SPEC-SCNC: 8.5 MG/DL (ref 8.6–10.5)
CHLORIDE SERPL-SCNC: 95 MMOL/L (ref 98–107)
CO2 SERPL-SCNC: 30 MMOL/L (ref 22–29)
CREAT SERPL-MCNC: 0.26 MG/DL (ref 0.57–1)
DEPRECATED RDW RBC AUTO: 45.3 FL (ref 37–54)
EGFRCR SERPLBLD CKD-EPI 2021: 123.6 ML/MIN/1.73
EOSINOPHIL # BLD AUTO: 0.69 10*3/MM3 (ref 0–0.4)
EOSINOPHIL NFR BLD AUTO: 5.3 % (ref 0.3–6.2)
ERYTHROCYTE [DISTWIDTH] IN BLOOD BY AUTOMATED COUNT: 15.2 % (ref 12.3–15.4)
GLUCOSE SERPL-MCNC: 98 MG/DL (ref 65–99)
HCT VFR BLD AUTO: 28.1 % (ref 34–46.6)
HGB BLD-MCNC: 9.1 G/DL (ref 12–15.9)
IMM GRANULOCYTES # BLD AUTO: 0.21 10*3/MM3 (ref 0–0.05)
IMM GRANULOCYTES NFR BLD AUTO: 1.6 % (ref 0–0.5)
LYMPHOCYTES # BLD AUTO: 1.95 10*3/MM3 (ref 0.7–3.1)
LYMPHOCYTES NFR BLD AUTO: 15.1 % (ref 19.6–45.3)
MAGNESIUM SERPL-MCNC: 2 MG/DL (ref 1.6–2.4)
MCH RBC QN AUTO: 26.5 PG (ref 26.6–33)
MCHC RBC AUTO-ENTMCNC: 32.4 G/DL (ref 31.5–35.7)
MCV RBC AUTO: 81.9 FL (ref 79–97)
MONOCYTES # BLD AUTO: 1.18 10*3/MM3 (ref 0.1–0.9)
MONOCYTES NFR BLD AUTO: 9.1 % (ref 5–12)
NEUTROPHILS NFR BLD AUTO: 68.5 % (ref 42.7–76)
NEUTROPHILS NFR BLD AUTO: 8.84 10*3/MM3 (ref 1.7–7)
NRBC BLD AUTO-RTO: 0 /100 WBC (ref 0–0.2)
PLATELET # BLD AUTO: 727 10*3/MM3 (ref 140–450)
PMV BLD AUTO: 9.3 FL (ref 6–12)
POTASSIUM SERPL-SCNC: 4 MMOL/L (ref 3.5–5.2)
QT INTERVAL: 206 MS
QT INTERVAL: 290 MS
QT INTERVAL: 292 MS
QT INTERVAL: 296 MS
QTC INTERVAL: 347 MS
QTC INTERVAL: 347 MS
QTC INTERVAL: 363 MS
QTC INTERVAL: 389 MS
RBC # BLD AUTO: 3.43 10*6/MM3 (ref 3.77–5.28)
SODIUM SERPL-SCNC: 131 MMOL/L (ref 136–145)
WBC NRBC COR # BLD AUTO: 12.92 10*3/MM3 (ref 3.4–10.8)

## 2025-01-13 PROCEDURE — 99231 SBSQ HOSP IP/OBS SF/LOW 25: CPT | Performed by: NURSE PRACTITIONER

## 2025-01-13 PROCEDURE — 94761 N-INVAS EAR/PLS OXIMETRY MLT: CPT

## 2025-01-13 PROCEDURE — 85025 COMPLETE CBC W/AUTO DIFF WBC: CPT | Performed by: INTERNAL MEDICINE

## 2025-01-13 PROCEDURE — 25010000002 CEFTRIAXONE PER 250 MG: Performed by: PHYSICIAN ASSISTANT

## 2025-01-13 PROCEDURE — 63710000001 PREDNISONE PER 1 MG: Performed by: INTERNAL MEDICINE

## 2025-01-13 PROCEDURE — 80048 BASIC METABOLIC PNL TOTAL CA: CPT | Performed by: INTERNAL MEDICINE

## 2025-01-13 PROCEDURE — 97116 GAIT TRAINING THERAPY: CPT

## 2025-01-13 PROCEDURE — 99232 SBSQ HOSP IP/OBS MODERATE 35: CPT | Performed by: INTERNAL MEDICINE

## 2025-01-13 PROCEDURE — 83735 ASSAY OF MAGNESIUM: CPT | Performed by: INTERNAL MEDICINE

## 2025-01-13 PROCEDURE — 94799 UNLISTED PULMONARY SVC/PX: CPT

## 2025-01-13 PROCEDURE — 97110 THERAPEUTIC EXERCISES: CPT

## 2025-01-13 PROCEDURE — 94664 DEMO&/EVAL PT USE INHALER: CPT

## 2025-01-13 RX ORDER — DILTIAZEM HYDROCHLORIDE 180 MG/1
180 CAPSULE, COATED, EXTENDED RELEASE ORAL
Status: DISCONTINUED | OUTPATIENT
Start: 2025-01-13 | End: 2025-01-20 | Stop reason: HOSPADM

## 2025-01-13 RX ADMIN — IPRATROPIUM BROMIDE AND ALBUTEROL SULFATE 3 ML: 2.5; .5 SOLUTION RESPIRATORY (INHALATION) at 16:45

## 2025-01-13 RX ADMIN — IPRATROPIUM BROMIDE AND ALBUTEROL SULFATE 3 ML: 2.5; .5 SOLUTION RESPIRATORY (INHALATION) at 08:23

## 2025-01-13 RX ADMIN — BENZONATATE 100 MG: 100 CAPSULE ORAL at 08:52

## 2025-01-13 RX ADMIN — METOPROLOL TARTRATE 25 MG: 25 TABLET, FILM COATED ORAL at 21:23

## 2025-01-13 RX ADMIN — APIXABAN 5 MG: 5 TABLET, FILM COATED ORAL at 21:23

## 2025-01-13 RX ADMIN — Medication 10 ML: at 08:53

## 2025-01-13 RX ADMIN — FLUTICASONE FUROATE AND VILANTEROL 1 PUFF: 200; 25 POWDER RESPIRATORY (INHALATION) at 23:05

## 2025-01-13 RX ADMIN — APIXABAN 5 MG: 5 TABLET, FILM COATED ORAL at 08:52

## 2025-01-13 RX ADMIN — Medication 10 ML: at 21:24

## 2025-01-13 RX ADMIN — PREDNISONE 20 MG: 20 TABLET ORAL at 08:52

## 2025-01-13 RX ADMIN — MINOCYCLINE HYDROCHLORIDE 100 MG: 50 CAPSULE ORAL at 21:23

## 2025-01-13 RX ADMIN — PANTOPRAZOLE SODIUM 40 MG: 40 TABLET, DELAYED RELEASE ORAL at 21:23

## 2025-01-13 RX ADMIN — LORAZEPAM 0.25 MG: 0.5 TABLET ORAL at 08:53

## 2025-01-13 RX ADMIN — IPRATROPIUM BROMIDE AND ALBUTEROL SULFATE 3 ML: 2.5; .5 SOLUTION RESPIRATORY (INHALATION) at 14:02

## 2025-01-13 RX ADMIN — MINOCYCLINE HYDROCHLORIDE 100 MG: 50 CAPSULE ORAL at 08:53

## 2025-01-13 RX ADMIN — IPRATROPIUM BROMIDE AND ALBUTEROL SULFATE 3 ML: 2.5; .5 SOLUTION RESPIRATORY (INHALATION) at 23:03

## 2025-01-13 RX ADMIN — BENZONATATE 100 MG: 100 CAPSULE ORAL at 21:23

## 2025-01-13 RX ADMIN — DILTIAZEM HYDROCHLORIDE 180 MG: 180 CAPSULE, EXTENDED RELEASE ORAL at 12:19

## 2025-01-13 RX ADMIN — SODIUM CHLORIDE 2000 MG: 900 INJECTION INTRAVENOUS at 18:16

## 2025-01-13 RX ADMIN — LORAZEPAM 0.25 MG: 0.5 TABLET ORAL at 18:16

## 2025-01-13 NOTE — PLAN OF CARE
Goal Outcome Evaluation:  Plan of Care Reviewed With: patient        Progress: improving  Outcome Evaluation: Pt required encouragement to participate in therapy this date. In addition pt continues to be limited by generalized weakness and impaired functional endurance. Pt increased her ambulation distance to 25ft w/ FWW and CGAx1 and O2 remained >90% however pt requested to return to bed due to fatigue.    Anticipated Discharge Disposition (PT): home with assist, home with home health
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
## 2025-01-13 NOTE — PROGRESS NOTES
Patient has been initiated on Apixaban (Eliquis) during admission. Education provided on 1/13/2025 verbally and in writing. Information provided includes effects of medication, drug-drug and drug-food interactions, and signs/symptoms of bleeding and clotting. Patient/family verbalized understanding through teach back. All pertinent questions were answered by pharmacist.

## 2025-01-13 NOTE — PROGRESS NOTES
Kristen Jean  [id number]  1960   LOS: 4 days   Patient Care Team:  Moiz Nova MD as PCP - General (Internal Medicine)  Luis Post MD (Cardiology)    Chief Complaint: Follow-up on atrial fibrillation, systolic heart failure, multifocal pneumonia    Subjective Patient denies any chest pain, worsening shortness of breath, or sputum production.  She is resting comfortably on 3 L O2 NC in bed this morning.  Currently in sinus rhythm.    Objective     Vital Sign Min/Max for last 24 hours  Temp  Min: 98.2 °F (36.8 °C)  Max: 99.4 °F (37.4 °C)   BP  Min: 100/45  Max: 138/55   Pulse  Min: 85  Max: 113   Resp  Min: 16  Max: 20   SpO2  Min: 90 %  Max: 96 %   No data recorded   Weight  Min: 46.7 kg (103 lb)  Max: 46.7 kg (103 lb)         01/09/25  1609 01/12/25  1138   Weight: 47 kg (103 lb 9.9 oz) 46.7 kg (103 lb)         Intake/Output Summary (Last 24 hours) at 1/13/2025 0920  Last data filed at 1/13/2025 0500  Gross per 24 hour   Intake 360 ml   Output 1150 ml   Net -790 ml       Physical Exam:     General Appearance:    Alert, cooperative, in no acute distress   Lungs:   Decreased breath sounds to auscultation,respirations regular, even and                unlabored, 3 L O2 NC    Heart:    Regular and normal rate, normal S1 and S2, no            murmur, no gallop, no rub, no click   Abdomen:  Extremities:   Soft, nontender, bowel sounds audible x4    No edema, normal range of motion   Pulses:   Pulses palpable and equal bilaterally      Results Review:   Results from last 7 days   Lab Units 01/13/25  0344 01/12/25  0555 01/11/25  1842   SODIUM mmol/L 131* 134* 132*   POTASSIUM mmol/L 4.0 4.2 4.6  4.6   CHLORIDE mmol/L 95* 98 97*   CO2 mmol/L 30.0* 29.0 25.0   BUN mg/dL 4* 3* 3*   CREATININE mg/dL 0.26* 0.19* 0.26*   GLUCOSE mg/dL 98 99 115*   CALCIUM mg/dL 8.5* 8.8 8.8     Results from last 7 days   Lab Units 01/13/25  0344 01/12/25  0554 01/11/25  1859   WBC 10*3/mm3 12.92* 11.64* 15.32*    HEMOGLOBIN g/dL 9.1* 9.4* 10.4*   HEMATOCRIT % 28.1* 28.8* 32.1*   PLATELETS 10*3/mm3 727* 648* 687*     Results from last 7 days   Lab Units 01/09/25  1807   CHOLESTEROL mg/dL 144   TRIGLYCERIDES mg/dL 74   HDL CHOL mg/dL 41   LDL CHOL mg/dL 88     Results from last 7 days   Lab Units 01/09/25  1622   HEMOGLOBIN A1C % 6.00*     Results from last 7 days   Lab Units 01/11/25  2135 01/11/25  1842 01/09/25  1807   CK TOTAL U/L  --   --  52   HSTROP T ng/L 14* 12 12   ECG 1/12/2025:  Normal sinus rhythm with sinus arrhythmia  Possible Left atrial enlargement  Low voltage QRS  Cannot rule out Anterior infarct , age undetermined  ST & T wave abnormality, consider lateral ischemia  Abnormal ECG  When compared with ECG of 11-Jan-2025 19:00, (Unconfirmed)  Vent. rate has decreased by  56 bpm  Minimal criteria for Anterior infarct are now present  Non-specific change in ST segment in Anterior leads  T wave inversion now evident in Anterolateral leads  Confirmed by SHIKHA JENNINGS ([id number]) on 1/13/2025 7:04:28 AM    No new chest x-ray to review    Echocardiogram 1/12/2025:  Left ventricular systolic function is hyperdynamic (EF > 70%).    No hemodynamically significant valvular disease present.    The right ventricular cavity is mildly dilated.    Estimated right ventricular systolic pressure from tricuspid regurgitation is normal (<35 mmHg).    There is a left pleural effusion.    Medication Review: Reviewed    Assessment & Plan   Patient with paroxysmal atrial fibrillation in the setting of multifocal pneumonia, currently in sinus rhythm.  Replete potassium greater than 4 and magnesium greater than 2.  Patient transitioned to long-acting diltiazem today.  Patient will need to follow-up with Dr. Miquel Post in 2-3 weeks.  Would consider Holter monitor at that time.  Continue Eliquis 5 mg twice daily, diltiazem  mg daily, metoprolol titrate 25 mg twice daily.  Treatment for PNA per hospitalist.      Multifocal pneumonia     Chronic bronchitis    Mycobacterium infection    Emphysema of lung    Asthma-COPD overlap syndrome    Anxiety disorder    Hyponatremia    Hypokalemia    Protein calorie malnutrition    Cachexia    End stage COPD    Hypothyroidism (acquired)    Mycobacterium avium complex    Severe protein-calorie malnutrition    Atrial fibrillation/flutter    Heart failure with improved ejection fraction (HFimpEF)    Aortic atherosclerosis    Atrial tachycardia    Electronically signed by NUNU Uriostegui, 01/13/25, 9:34 AM EST.     01/13/25  09:20 EST

## 2025-01-13 NOTE — THERAPY TREATMENT NOTE
Patient Name: Kristen Jean  : 1960    MRN: [id number]                              Today's Date: 2025       Admit Date: 2025    Visit Dx:     ICD-10-CM ICD-9-CM   1. Multifocal pneumonia  J18.9 486   2. History of MAC infection  Z86.19 V12.09   3. Bandemia  D72.825 288.66   4. Hyponatremia  E87.1 276.1     Patient Active Problem List   Diagnosis    Chronic bronchitis    Uncomplicated asthma    Mycobacterium infection    Emphysema of lung    Asthma-COPD overlap syndrome    Anxiety disorder    Multifocal pneumonia    Hyponatremia    Hypokalemia    Protein calorie malnutrition    Cachexia    End stage COPD    Hypothyroidism (acquired)    Mycobacterium avium complex    Severe protein-calorie malnutrition    Atrial fibrillation/flutter    Heart failure with improved ejection fraction (HFimpEF)    Aortic atherosclerosis    Atrial tachycardia     Past Medical History:   Diagnosis Date    Anxiety disorder 2025    Asthma-COPD overlap syndrome 2025    Breast injury     Cachexia 2025    Chronic bronchitis 2016    Emphysema of lung 2025    Hypothyroidism (acquired) 2025    Mycobacterium infection 2016    T2DM (type 2 diabetes mellitus) 2025     Past Surgical History:   Procedure Laterality Date    TOOTH EXTRACTION       and       General Information       Row Name 25 1540          Physical Therapy Time and Intention    Document Type therapy note (daily note)  -AC     Mode of Treatment individual therapy;physical therapy  -AC       Row Name 25 1546          General Information    Patient Profile Reviewed yes  -AC     Existing Precautions/Restrictions orthostatic hypotension;oxygen therapy device and L/min  monitor O2, BP  -AC     Barriers to Rehab medically complex;previous functional deficit;ineffective coping  -AC       Row Name 25 4488          Cognition    Orientation Status (Cognition) oriented x 4  -AC       Row Name 25 154           Safety Issues/Impairments Affecting Functional Mobility    Safety Issues Affecting Function (Mobility) insight into deficits/self-awareness  -AC     Impairments Affecting Function (Mobility) endurance/activity tolerance;shortness of breath;pain;strength;balance  -AC               User Key  (r) = Recorded By, (t) = Taken By, (c) = Cosigned By      Initials Name Provider Type    AC Gricelda Diaz, PT Physical Therapist                   Mobility       Row Name 01/13/25 1541          Bed Mobility    Supine-Sit Davy (Bed Mobility) standby assist  -AC     Sit-Supine Davy (Bed Mobility) standby assist  -AC     Assistive Device (Bed Mobility) head of bed elevated  -AC     Comment, (Bed Mobility) Pt required extended time to transition to sitting EOB  -AC       Row Name 01/13/25 1541          Transfers    Comment, (Transfers) VC for hand placement and sequencing. STS 3x  -AC       Row Name 01/13/25 1541          Sit-Stand Transfer    Sit-Stand Davy (Transfers) standby assist  -AC     Comment, (Sit-Stand Transfer) 2x from EOB, 1x from BSC, vc for safety and sequencing w/ lines  -AC       Row Name 01/13/25 1541          Gait/Stairs (Locomotion)    Davy Level (Gait) contact guard  -AC     Assistive Device (Gait) walker, front-wheeled  -AC     Patient was able to Ambulate yes  -AC     Distance in Feet (Gait) 25  -AC     Deviations/Abnormal Patterns (Gait) bilateral deviations;betty decreased;gait speed decreased;stride length decreased  -AC     Bilateral Gait Deviations heel strike decreased  -AC     Comment, (Gait/Stairs) Pt ambulated 25ft w/ FWW and CGAx1. Pt required VC for body position in walker as well as breathing cues. Pt's O2 remained >90% throughout ambulation  -AC               User Key  (r) = Recorded By, (t) = Taken By, (c) = Cosigned By      Initials Name Provider Type    AC Gricelda Diaz PT Physical Therapist                   Obj/Interventions       Row Name 01/13/25 1549           Motor Skills    Functional Endurance below baseline  -     Therapeutic Exercise knee;ankle;hip  -[facility] Name 01/13/25 1544          Hip (Therapeutic Exercise)    Hip (Therapeutic Exercise) strengthening exercise  -     Hip Strengthening (Therapeutic Exercise) bilateral;marching while seated  pt required frequent breaks due to fatigue  -       Row Name 01/13/25 1544          Knee (Therapeutic Exercise)    Knee (Therapeutic Exercise) strengthening exercise  -     Knee Strengthening (Therapeutic Exercise) LAQ (long arc quad);10 repetitions;bilateral  -[facility] Name 01/13/25 1544          Ankle (Therapeutic Exercise)    Ankle (Therapeutic Exercise) AROM (active range of motion)  -     Ankle AROM (Therapeutic Exercise) bilateral;dorsiflexion;plantarflexion;10 repetitions  -[facility] Name 01/13/25 1544          Balance    Balance Assessment sitting static balance;sitting dynamic balance;standing static balance;standing dynamic balance  -     Static Sitting Balance independent  -     Dynamic Sitting Balance standby assist  -     Position, Sitting Balance unsupported;sitting edge of bed  -     Static Standing Balance standby assist  -     Dynamic Standing Balance contact guard  -     Position/Device Used, Standing Balance supported  -     Balance Interventions sitting;standing;sit to stand;supported;static;dynamic  -               User Key  (r) = Recorded By, (t) = Taken By, (c) = Cosigned By      Initials Name Provider Type    AC Gricelda Diaz PT Physical Therapist                   Goals/Plan    No documentation.                  Clinical Impression       [facility] Name 01/13/25 1545          Pain Scale: FACES Pre/Post-Treatment    Pain: FACES Scale, Pretreatment 0-->no hurt  -     Posttreatment Pain Rating 0-->no hurt  -[facility] Name 01/13/25 1545          Plan of Care Review    Plan of Care Reviewed With patient  -AC     Progress improving  -     Outcome Evaluation Pt  required encouragement to participate in therapy this date. In addition pt continues to be limited by generalized weakness and impaired functional endurance. Pt increased her ambulation distance to 25ft w/ FWW and CGAx1 and O2 remained >90% however pt requested to return to bed due to fatigue.  -AC       Row Name 01/13/25 1545          Therapy Assessment/Plan (PT)    Rehab Potential (PT) good  -AC     Criteria for Skilled Interventions Met (PT) yes;meets criteria;skilled treatment is necessary  -AC     Therapy Frequency (PT) daily  -AC     Predicted Duration of Therapy Intervention (PT) 2 weeks  -AC       Row Name 01/13/25 1545          Vital Signs    O2 Delivery Pre Treatment nasal cannula  -AC     O2 Delivery Intra Treatment nasal cannula  -AC     O2 Delivery Post Treatment nasal cannula  -AC     Pre Patient Position Supine  -AC     Intra Patient Position Standing  -AC     Post Patient Position Supine  -AC       Row Name 01/13/25 1545          Positioning and Restraints    Pre-Treatment Position in bed  -AC     Post Treatment Position bed  pt refused chair  -AC     In Bed notified nsg;supine;call light within reach;encouraged to call for assist;exit alarm on;side rails up x2  -AC               User Key  (r) = Recorded By, (t) = Taken By, (c) = Cosigned By      Initials Name Provider Type    Gricelda Gordon, PT Physical Therapist                   Outcome Measures       Row Name 01/13/25 1547 01/13/25 0800       How much help from another person do you currently need...    Turning from your back to your side while in flat bed without using bedrails? 4  -AC 4  -DB    Moving from lying on back to sitting on the side of a flat bed without bedrails? 4  -AC 4  -DB    Moving to and from a bed to a chair (including a wheelchair)? 3  -AC 4  -DB    Standing up from a chair using your arms (e.g., wheelchair, bedside chair)? 3  -AC 4  -DB    Climbing 3-5 steps with a railing? 3  -AC 4  -DB    To walk in hospital room? 3  -AC 4   -DB    AM-PAC 6 Clicks Score (PT) 20  -AC 24  -DB    Highest Level of Mobility Goal 6 --> Walk 10 steps or more  -AC 8 --> Walked 250 feet or more  -DB      Row Name 01/13/25 1547          Functional Assessment    Outcome Measure Options AM-PAC 6 Clicks Basic Mobility (PT)  -               User Key  (r) = Recorded By, (t) = Taken By, (c) = Cosigned By      Initials Name Provider Type    DB Roberto Apodaca, RN Registered Nurse    AC Gricelda Diaz, PT Physical Therapist                                 Physical Therapy Education       Title: PT OT SLP Therapies (In Progress)       Topic: Physical Therapy (Done)       Point: Mobility training (Done)       Learning Progress Summary            Patient Acceptance, E, VU by  at 1/13/2025 1547    Acceptance, E, VU,NR by  at 1/10/2025 0945    Comment: PT POC                      Point: Home exercise program (Done)       Learning Progress Summary            Patient Acceptance, E, VU by  at 1/13/2025 1547    Acceptance, E, VU,NR by  at 1/10/2025 0945    Comment: PT POC                      Point: Body mechanics (Done)       Learning Progress Summary            Patient Acceptance, E, VU by  at 1/13/2025 1547    Acceptance, E, VU,NR by  at 1/10/2025 0945    Comment: PT POC                      Point: Precautions (Done)       Learning Progress Summary            Patient Acceptance, E, VU by  at 1/13/2025 1547    Acceptance, E, VU,NR by  at 1/10/2025 0945    Comment: PT POC                                      User Key       Initials Effective Dates Name Provider Type Discipline     06/16/21 -  Elina Zaidi, PT Physical Therapist PT     07/11/24 -  Gricelda Diaz, LYNDON Physical Therapist PT                  PT Recommendation and Plan     Progress: improving  Outcome Evaluation: Pt required encouragement to participate in therapy this date. In addition pt continues to be limited by generalized weakness and impaired functional endurance. Pt increased her ambulation  distance to 25ft w/ FWW and CGAx1 and O2 remained >90% however pt requested to return to bed due to fatigue.     Time Calculation:         PT Charges       Row Name 01/13/25 1547             Time Calculation    Start Time 1514  -AC      PT Received On 01/13/25  -      PT Goal Re-Cert Due Date 01/20/25  -         Time Calculation- PT    Total Timed Code Minutes- PT 26 minute(s)  -AC         Timed Charges    79726 - PT Therapeutic Exercise Minutes 10  -AC      78209 - Gait Training Minutes  16  -AC         Total Minutes    Timed Charges Total Minutes 26  -AC       Total Minutes 26  -AC                User Key  (r) = Recorded By, (t) = Taken By, (c) = Cosigned By      Initials Name Provider Type    AC Gricelda Diaz, PT Physical Therapist                  Therapy Charges for Today       Code Description Service Date Service Provider Modifiers Qty    59453505029 HC PT THER PROC EA 15 MIN 1/13/2025 Gricelda Diaz, PT GP 1    58407413707 HC GAIT TRAINING EA 15 MIN 1/13/2025 Gricelda Diaz, PT GP 1            PT G-Codes  Outcome Measure Options: AM-PAC 6 Clicks Basic Mobility (PT)  AM-PAC 6 Clicks Score (PT): 20  AM-PAC 6 Clicks Score (OT): 20  PT Discharge Summary  Anticipated Discharge Disposition (PT): home with assist, home with home health    Gricelda Diaz PT  1/13/2025

## 2025-01-13 NOTE — PLAN OF CARE
Problem: Adult Inpatient Plan of Care  Goal: Plan of Care Review  Outcome: Progressing  Flowsheets (Taken 1/13/2025 1104)  Progress: improving  Plan of Care Reviewed With: patient  Goal: Patient-Specific Goal (Individualized)  Outcome: Progressing  Goal: Absence of Hospital-Acquired Illness or Injury  Outcome: Progressing  Intervention: Identify and Manage Fall Risk  Recent Flowsheet Documentation  Taken 1/13/2025 1000 by Roberto Apodaca RN  Safety Promotion/Fall Prevention:   activity supervised   nonskid shoes/slippers when out of bed   room organization consistent   safety round/check completed   clutter free environment maintained   fall prevention program maintained   lighting adjusted  Taken 1/13/2025 0800 by Roberto Apodaca RN  Safety Promotion/Fall Prevention:   activity supervised   nonskid shoes/slippers when out of bed   room organization consistent   safety round/check completed   clutter free environment maintained   fall prevention program maintained  Intervention: Prevent Skin Injury  Recent Flowsheet Documentation  Taken 1/13/2025 1000 by Roberto Apodaca RN  Body Position:   position changed independently   weight shifting  Taken 1/13/2025 0800 by Roberto Apodaca RN  Body Position:   position changed independently   weight shifting  Skin Protection: silicone foam dressing in place  Intervention: Prevent and Manage VTE (Venous Thromboembolism) Risk  Recent Flowsheet Documentation  Taken 1/13/2025 0800 by Roberto Apodaca RN  VTE Prevention/Management: (Patient is on Eliquis)   bilateral   other (see comments)  Intervention: Prevent Infection  Recent Flowsheet Documentation  Taken 1/13/2025 1000 by Roberto Apodaca RN  Infection Prevention:   environmental surveillance performed   equipment surfaces disinfected   hand hygiene promoted   personal protective equipment utilized   rest/sleep promoted   single patient room provided  Taken 1/13/2025 0800 by Roberto Apodaca RN  Infection Prevention:    environmental surveillance performed   equipment surfaces disinfected   hand hygiene promoted   personal protective equipment utilized   rest/sleep promoted   single patient room provided  Goal: Optimal Comfort and Wellbeing  Outcome: Progressing  Intervention: Provide Person-Centered Care  Recent Flowsheet Documentation  Taken 1/13/2025 0800 by Roberto Apodaca RN  Trust Relationship/Rapport:   care explained   choices provided   emotional support provided   empathic listening provided   questions answered   questions encouraged   reassurance provided   thoughts/feelings acknowledged  Goal: Readiness for Transition of Care  Outcome: Progressing     Problem: Fall Injury Risk  Goal: Absence of Fall and Fall-Related Injury  Outcome: Progressing  Intervention: Identify and Manage Contributors  Recent Flowsheet Documentation  Taken 1/13/2025 1000 by Roberto Apodaca RN  Medication Review/Management:   medications reviewed   high-risk medications identified  Self-Care Promotion:   BADL personal objects within reach   BADL personal routines maintained   adaptive equipment use encouraged  Taken 1/13/2025 0800 by Roberto Apodaca RN  Medication Review/Management:   medications reviewed   high-risk medications identified  Self-Care Promotion:   BADL personal objects within reach   BADL personal routines maintained   adaptive equipment use encouraged  Intervention: Promote Injury-Free Environment  Recent Flowsheet Documentation  Taken 1/13/2025 1000 by Roberto Apodaca RN  Safety Promotion/Fall Prevention:   activity supervised   nonskid shoes/slippers when out of bed   room organization consistent   safety round/check completed   clutter free environment maintained   fall prevention program maintained   lighting adjusted  Taken 1/13/2025 0800 by Roberto Apodaca RN  Safety Promotion/Fall Prevention:   activity supervised   nonskid shoes/slippers when out of bed   room organization consistent   safety round/check completed    clutter free environment maintained   fall prevention program maintained     Problem: Pain Acute  Goal: Optimal Pain Control and Function  Outcome: Progressing  Intervention: Optimize Psychosocial Wellbeing  Recent Flowsheet Documentation  Taken 1/13/2025 0800 by Roberto Apodaca RN  Supportive Measures:   active listening utilized   decision-making supported   positive reinforcement provided   self-responsibility promoted   verbalization of feelings encouraged  Diversional Activities: television  Spiritual Activities Assistance:   affirmation provided   personal rituals encouraged  Intervention: Prevent or Manage Pain  Recent Flowsheet Documentation  Taken 1/13/2025 1000 by Roberto Apodaca RN  Sleep/Rest Enhancement: awakenings minimized  Medication Review/Management:   medications reviewed   high-risk medications identified  Taken 1/13/2025 0800 by Roberto Apodaca RN  Sensory Stimulation Regulation:   auditory stimulation minimized   care clustered   lighting decreased   quiet environment promoted   tactile stimulation minimized   visual stimulation minimized  Sleep/Rest Enhancement:   awakenings minimized   natural light exposure provided   relaxation techniques promoted  Medication Review/Management:   medications reviewed   high-risk medications identified     Problem: Infection  Goal: Absence of Infection Signs and Symptoms  Outcome: Progressing  Intervention: Prevent or Manage Infection  Recent Flowsheet Documentation  Taken 1/13/2025 0800 by Roberto Apodaca RN  Infection Management: aseptic technique maintained  Fever Reduction/Comfort Measures:   lightweight bedding   lightweight clothing   Goal Outcome Evaluation:  Plan of Care Reviewed With: patient        Progress: improving

## 2025-01-13 NOTE — PROGRESS NOTES
INFECTIOUS DISEASE Progress Note    Kristen Jean  1960  [id number]    Admission Date: 1/9/2025      Requesting Provider: Rosi Medina MD  Evaluating Physician: Az Wasserman MD    Reason for Consultation: Multifocal pneumonia with h/o MAC on chronic therapy    History of present illness:     1/10/2025:Patient is a 64 y.o. female with pulmonary Mycobacterium avium infection 2015/on chronic therapy, ES COPD/3L O2 baseline, and protein calorie malnutrition who presented to [facility] ED on 1/9/25 with worsening shortness of breath with right pleuritic chest pain, and higher oxygen requirements over the last week prior to admission. About 2 days ago, the patient bent over and she felt like she tore something in her right lower chest.  She quit smoking 11/2024, but all her neighbors smoke in the apartment complex.  The past week, she has needed 5-6 L/min O2 and is still satting at 93% on this oxygen setting.  She follow pulmonology and ID at [facility].  She has been noncompliant with her antibiotics for BONI and rotating taking Levaquin, Azithromycin and Rifampin because of complaints of side effects.   She denies fever, chills, nausea, vomiting, diarrhea, or dysuria.  She has remained afebrile with initial tachycardia. Initial labs were , PCT 0.09, WBC 16,700 with 77% neutrophils, Na 127, K 3.4, creatinine 0.31, CRP 13.13, and CPK 52.  A respiratory panel PCR was negative.  Urinary antigens for Strep pneumo and Legionella were negative. Nasal MRSA PCR is negative. A CT scan of chest without contrast showed right lung multifocal pneumonia, decrease in size of RLL pulmonary nodule.  She is currently on Merrem and Zyvox.  ID was asked to evaluate and manage her antibiotic therapy.    She has been noncompliant with follow-up.  Her last ID visit at  was in 5/24.  She has been noncompliant with her azithromycin, rifampin, and Levaquin as she takes these medications intermittently.  She states that she  has not received an RSV vaccine, Prevnar 20 pneumococcal vaccine, influenza vaccine, or COVID-19 vaccine booster     1/11/2025:  She has remained afebrile.  White blood cell count is 13.9.   MRSA nasal PCR was negative.  Urinary Legionella and pneumococcal antigens were negative.  O2 saturation is 90% on 3 L.    He complains of pleuritic right chest pain.  She complains of dyspnea.  She has minimal sputum production.   she refused taking the doxycycline due to prior nausea on doxycycline.  She is agreeable to trying minocycline.  She will need to take the minocycline with food but not any calcium containing food within 1 to 2 hours of the capsules.  I discussed this issue with her today in detail     1/12/2025: She remains afebrile. White blood cell count is 11.6.   She feels better today.  She has decreased right pleuritic chest pain.  She denies increased cough and sputum production.  She denies nausea vomiting. She denies increased dyspnea.  She is tolerating the minocycline with no nausea.     1/13/2025:   She remains afebrile.  White blood cell count is 12.9.  Creatinine is 0.26.  Sodium is 131.  She is on 3 L of oxygen with an O2 saturation of 94%.   She continues to feel better.  Her right pleuritic chest pain is resolved.  She denies increased dyspnea and cough.  She denies hemoptysis    Past Medical History:   Diagnosis Date    Anxiety disorder 01/09/2025    Asthma-COPD overlap syndrome 01/09/2025    Breast injury     Cachexia 01/09/2025    Chronic bronchitis 08/11/2016    Emphysema of lung 01/09/2025    Hypothyroidism (acquired) 01/09/2025    Mycobacterium infection 08/11/2016    T2DM (type 2 diabetes mellitus) 01/09/2025       Past Surgical History:   Procedure Laterality Date    TOOTH EXTRACTION      1990 and 2002       Family History   Problem Relation Age of Onset    Dementia Father     Breast cancer Neg Hx     Ovarian cancer Neg Hx        Social History     Socioeconomic History    Marital status:  Single   Tobacco Use    Smoking status: Every Day   Vaping Use    Vaping status: Never Used   Substance and Sexual Activity    Alcohol use: No    Drug use: Never    Sexual activity: Defer       Allergies   Allergen Reactions    Codeine     Fluticasone-Salmeterol     Penicillins          Medication:    Current Facility-Administered Medications:     !!! Please obtain patients home medications that are stored in central pharmacy (including Breo inhaler in [facility]) and return to patient prior to discharge!, , Not Applicable, BID, Nicole Ch, PharmD, Given at 01/10/25 2337    acetaminophen (TYLENOL) tablet 650 mg, 650 mg, Oral, Q4H PRN **OR** acetaminophen (TYLENOL) 160 MG/5ML oral solution 650 mg, 650 mg, Oral, Q4H PRN **OR** acetaminophen (TYLENOL) suppository 650 mg, 650 mg, Rectal, Q4H PRN, Rosi Medina MD    albuterol (PROVENTIL) nebulizer solution 0.083% 2.5 mg/3mL, 2.5 mg, Nebulization, Q4H PRN, Luis Lanier MD, 2.5 mg at 01/12/25 0402    apixaban (ELIQUIS) tablet 5 mg, 5 mg, Oral, Q12H, Wayne Salcedo IV, MD, 5 mg at 01/12/25 2143    benzonatate (TESSALON) capsule 100 mg, 100 mg, Oral, TID PRN, Rosi Medina MD, 100 mg at 01/12/25 1800    sennosides-docusate (PERICOLACE) 8.6-50 MG per tablet 2 tablet, 2 tablet, Oral, BID PRN **AND** polyethylene glycol (MIRALAX) packet 17 g, 17 g, Oral, Daily PRN **AND** bisacodyl (DULCOLAX) EC tablet 5 mg, 5 mg, Oral, Daily PRN **AND** bisacodyl (DULCOLAX) suppository 10 mg, 10 mg, Rectal, Daily PRN, Rosi Median MD    Calcium Replacement - Follow Nurse / BPA Driven Protocol, , Not Applicable, PRN, Rosi Medina MD    cefTRIAXone (ROCEPHIN) 2,000 mg in sodium chloride 0.9 % 100 mL MBP, 2,000 mg, Intravenous, Q24H, Anthony Hodges PA, Last Rate: 200 mL/hr at 01/12/25 1749, 2,000 mg at 01/12/25 1749    dilTIAZem (CARDIZEM) tablet 60 mg, 60 mg, Oral, Q8H, Wayne Salcedo IV, MD, 60 mg at 01/12/25 1530    Fluticasone  Furoate-Vilanterol (BREO ELLIPTA) 200-25 MCG/ACT inhaler 1 puff **Patient Supplied Med**, 1 puff, Inhalation, Daily - RT, Dorian Sinha, [facility], 1 puff at 01/10/25 1930    Halls Cough Drops (lozenges), 1 lozenge, Buccal, Q2H PRN, Marlen Blevins PA-C, 1 lozenge at 01/11/25 1754    Hydrocod Varun-Chlorphe Varun ER (TUSSIONEX PENNKINETIC) 10-8 MG/5ML ER suspension 2.5 mL, 2.5 mL, Oral, Q12H PRN, Marlen Blevins PA-C, 2.5 mL at 01/11/25 0212    ipratropium-albuterol (DUO-NEB) nebulizer solution 3 mL, 3 mL, Nebulization, 4x Daily - RT, Rosi Medina MD, 3 mL at 01/13/25 0823    lactated ringers bolus 500 mL, 500 mL, Intravenous, Once, Rosi Medina MD    LORazepam (ATIVAN) tablet 0.25 mg, 0.25 mg, Oral, Q6H PRN, Franchesca Stewart MD, 0.25 mg at 01/12/25 2153    Magnesium Standard Dose Replacement - Follow Nurse / BPA Driven Protocol, , Not Applicable, PRN, Rosi Medina MD    melatonin tablet 5 mg, 5 mg, Oral, Nightly, Rosi Medina MD    metoprolol tartrate (LOPRESSOR) tablet 25 mg, 25 mg, Oral, Q12H, Wayne Salcedo IV, MD, 25 mg at 01/12/25 0759    minocycline (MINOCIN,DYNACIN) capsule 100 mg, 100 mg, Oral, Q12H, Az Wasserman MD, 100 mg at 01/12/25 2143    nitroglycerin (NITROSTAT) SL tablet 0.4 mg, 0.4 mg, Sublingual, Q5 Min PRN, Rosi Medina MD    pantoprazole (PROTONIX) EC tablet 40 mg, 40 mg, Oral, Nightly, Rosi Medina MD, 40 mg at 01/12/25 2142    Pharmacy Consult, , Not Applicable, Continuous PRN, Luis Lanier MD    Phosphorus Replacement - Follow Nurse / BPA Driven Protocol, , Not Applicable, PRN, Rosi Medina MD    Potassium Replacement - Follow Nurse / BPA Driven Protocol, , Not Applicable, PRN, Rosi Medina MD    predniSONE (DELTASONE) tablet 20 mg, 20 mg, Oral, Daily With Breakfast, Luis Lanier MD, 20 mg at 01/12/25 0758    sodium chloride 0.9 % flush 10 mL, 10 mL, Intravenous, PRN, Rosi Medina MD    sodium chloride  0.9 % flush 10 mL, 10 mL, Intravenous, Q12H, Rosi Medina MD, 10 mL at 01/12/25 2146    sodium chloride 0.9 % flush 10 mL, 10 mL, Intravenous, PRN, Rosi Medina MD    sodium chloride 0.9 % infusion 40 mL, 40 mL, Intravenous, PRN, Rosi Medina MD    Antibiotics:  Anti-Infectives (From admission, onward)      Ordered     Dose/Rate Route Frequency Start Stop    01/11/25 1011  minocycline (MINOCIN,DYNACIN) capsule 100 mg        Ordering Provider: Az Wasserman MD    100 mg Oral Every 12 Hours Scheduled 01/11/25 1100 01/21/25 0859    01/10/25 1534  doxycycline (MONODOX) capsule 100 mg  Status:  Discontinued        Ordering Provider: Anthony Hodges PA    100 mg Oral Every 12 Hours Scheduled 01/10/25 2100 01/11/25 1010    01/10/25 1534  cefTRIAXone (ROCEPHIN) 2,000 mg in sodium chloride 0.9 % 100 mL MBP        Ordering Provider: Anthony Hodges PA    2,000 mg  200 mL/hr over 30 Minutes Intravenous Every 24 Hours 01/10/25 1800 01/24/25 1759    01/09/25 2014  meropenem (MERREM) 1,000 mg in sodium chloride 0.9 % 100 mL MBP  Status:  Discontinued        Ordering Provider: Rosi Medina MD    1,000 mg  over 3 Hours Intravenous Every 8 Hours 01/10/25 0600 01/10/25 1534    01/09/25 2014  Linezolid (ZYVOX) 600 mg 300 mL  Status:  Discontinued        Ordering Provider: Rosi Medina MD    600 mg  300 mL/hr over 60 Minutes Intravenous Every 12 Hours 01/10/25 0500 01/11/25 0846    01/09/25 2014  meropenem (MERREM) 1,000 mg in sodium chloride 0.9 % 100 mL MBP        Ordering Provider: Rosi Medina MD    1,000 mg  over 30 Minutes Intravenous Once 01/10/25 0000 01/10/25 0153    01/09/25 1717  Linezolid (ZYVOX) 600 mg 300 mL        Ordering Provider: Ezequiel Eaton DO    600 mg  300 mL/hr over 60 Minutes Intravenous Once 01/09/25 1733 01/09/25 1843    01/09/25 1717  cefepime 2000 mg IVPB in 100 mL NS (MBP)        Ordering Provider: Ezequiel Eaton,     2,000 mg  over 30 Minutes  Intravenous Once 25 1733 25 1817              Review of Systems:  See HPI      Physical Exam:   Vital Signs  Temp (24hrs), Av.8 °F (37.1 °C), Min:98.2 °F (36.8 °C), Max:99.4 °F (37.4 °C)    Temp  Min: 98.2 °F (36.8 °C)  Max: 99.4 °F (37.4 °C)  BP  Min: 100/45  Max: 138/55  Pulse  Min: 85  Max: 113  Resp  Min: 16  Max: 20  SpO2  Min: 90 %  Max: 96 %    GENERAL: Ill and cachectic appearing.  HEENT: Normocephalic, atraumatic.  PERRL. EOMI. No conjunctival injection. No icterus. Oropharynx clear without evidence of thrush or exudate.    NECK: Supple   HEART: RRR; No murmur, rubs, gallops.   LUNGS:   Bilateral right greater than left crackles  ABDOMEN: Soft, nontender, nondistended. No rebound or guarding. NO mass or HSM.  EXT:  No cyanosis, clubbing or edema. No cord.  :  Without Ocasio catheter.  MSK: No joint effusions or erythema  SKIN: Warm and dry without cutaneous eruptions on Inspection/palpation.    NEURO: Oriented to PPT.  Motor 5/5 strength  PSYCHIATRIC: Normal insight and judgment. Cooperative with PE    Laboratory Data    Results from last 7 days   Lab Units 25  0344 25  0554 25  1859   WBC 10*3/mm3 12.92* 11.64* 15.32*   HEMOGLOBIN g/dL 9.1* 9.4* 10.4*   HEMATOCRIT % 28.1* 28.8* 32.1*   PLATELETS 10*3/mm3 727* 648* 687*     Results from last 7 days   Lab Units 25  0344   SODIUM mmol/L 131*   POTASSIUM mmol/L 4.0   CHLORIDE mmol/L 95*   CO2 mmol/L 30.0*   BUN mg/dL 4*   CREATININE mg/dL 0.26*   GLUCOSE mg/dL 98   CALCIUM mg/dL 8.5*     Results from last 7 days   Lab Units 25  1842   ALK PHOS U/L 434*   BILIRUBIN mg/dL <0.2   ALT (SGPT) U/L 51*   AST (SGOT) U/L 101*     Results from last 7 days   Lab Units 25  1622   SED RATE mm/hr 113*     Results from last 7 days   Lab Units 25  1807   CRP mg/dL 13.13*         Results from last 7 days   Lab Units 25  1807   CK TOTAL U/L 52         Estimated Creatinine Clearance: 163.3 mL/min (A) (by C-G  formula based on SCr of 0.26 mg/dL (L)).      Microbiology:  Microbiology Results (last 10 days)       Procedure Component Value - Date/Time    MRSA Screen, PCR (Inpatient) - Swab, Nares [[id number]]  (Normal) Collected: 01/10/25 1435    Lab Status: Final result Specimen: Swab from Nares Updated: 01/10/25 1607     MRSA PCR Negative    Narrative:      The negative predictive value of this diagnostic test is high and should only be used to consider de-escalating anti-MRSA therapy. A positive result may indicate colonization with MRSA and must be correlated clinically.  MRSA Negative    S. Pneumo Ag Urine or CSF - Urine, Urine, Clean Catch [[id number]]  (Normal) Collected: 01/10/25 0729    Lab Status: Final result Specimen: Urine, Clean Catch Updated: 01/10/25 1353     Strep Pneumo Ag Negative    Legionella Antigen, Urine - Urine, Urine, Clean Catch [[id number]]  (Normal) Collected: 01/10/25 0729    Lab Status: Final result Specimen: Urine, Clean Catch Updated: 01/10/25 1353     LEGIONELLA ANTIGEN, URINE Negative    Respiratory Panel PCR w/COVID-19(SARS-CoV-2) MARIANELA/BRONWYN/LUZ MARINA/PAD/COR/KINGS In-House, NP Swab in UTM/VTM, 2 HR TAT - Swab, Nasopharynx [[id number]]  (Normal) Collected: 01/10/25 0133    Lab Status: Final result Specimen: Swab from Nasopharynx Updated: 01/10/25 0238     ADENOVIRUS, PCR Not Detected     Coronavirus 229E Not Detected     Coronavirus HKU1 Not Detected     Coronavirus NL63 Not Detected     Coronavirus OC43 Not Detected     COVID19 Not Detected     Human Metapneumovirus Not Detected     Human Rhinovirus/Enterovirus Not Detected     Influenza A PCR Not Detected     Influenza B PCR Not Detected     Parainfluenza Virus 1 Not Detected     Parainfluenza Virus 2 Not Detected     Parainfluenza Virus 3 Not Detected     Parainfluenza Virus 4 Not Detected     RSV, PCR Not Detected     Bordetella pertussis pcr Not Detected     Bordetella parapertussis PCR Not Detected     Chlamydophila pneumoniae PCR Not Detected      Mycoplasma pneumo by PCR Not Detected    Narrative:      In the setting of a positive respiratory panel with a viral infection PLUS a negative procalcitonin without other underlying concern for bacterial infection, consider observing off antibiotics or discontinuation of antibiotics and continue supportive care. If the respiratory panel is positive for atypical bacterial infection (Bordetella pertussis, Chlamydophila pneumoniae, or Mycoplasma pneumoniae), consider antibiotic de-escalation to target atypical bacterial infection.                  Radiology:  Imaging Results (Last 72 Hours)       ** No results found for the last 72 hours. **         I read her radiographic images.      Impression:   Multifocal pneumonia- this is likely a bacterial pneumonia superimposed on her BONI.  I will leave her on intravenous Rocephin and oral minocycline.  Mycobacterium avium complex pulmonary infection -since 2016,  with therapeutic noncompliance.  She has been rotating Levaquin, azithromycin, and rifampin d/t side effects.  She is not compliant with ID follow up. She is at high risk for developing a resistant BONI strain that will make it difficult to treat in the future.   Leukocytosis/neutrophilia  End stage chronic obstructive pulmonary disease/on 3L O2 baseline at home  Protein calorie malnutrition  Penicillin list allergy.  Seems to tolerate cephalosporins.  Medical noncompliance-this complicates all aspects of her care and increases her risk for poor outcome.     Hyponatremia-mild    PLAN/RECOMMENDATIONS:   Rocephin 2 GM IV daily  Minocycline 100 mg p.o. twice daily  Continue O2 support  Prevnar 20 pneumococcal vaccination, influenza vaccination, RSV vaccination, and COVID-19 vaccine booster in the near future  Infectious disease follow-up at -I have advised her to be compliant with her follow-up appointments    She may be ready for discharge tomorrow.      Az Wasserman MD  1/13/2025  08:42 EST

## 2025-01-13 NOTE — PROGRESS NOTES
[facility] Medicine Services  PROGRESS NOTE    Patient Name: Kristen Jean  : 1960  MRN: [id number]    Date of Admission: 2025  Primary Care Physician: Moiz Nova MD    Subjective   Subjective     CC:  SOA    HPI:  Doing much better this am, denies any new issues overnight. Anxious to go home.     Objective   Objective     Vital Signs:   Temp:  [98.2 °F (36.8 °C)-99.4 °F (37.4 °C)] 98.8 °F (37.1 °C)  Heart Rate:  [] 104  Resp:  [16-20] 18  BP: (100-147)/(44-56) 147/56  Flow (L/min) (Oxygen Therapy):  [3] 3     Physical Exam:  Constitutional: No acute distress, awake, alert, cachetic appearing WF in NAD  HENT: NCAT, mucous membranes moist  Respiratory: diffuse rhonchi bilaterally   Cardiovascular: RRR,  no murmurs, rubs, or gallops  Gastrointestinal: Positive bowel sounds, soft, nontender, nondistended  Musculoskeletal: No bilateral ankle edema  Psychiatric: Appropriate affect, cooperative  Neurologic: Oriented x 3, strength symmetric in all extremities, Cranial Nerves grossly intact to confrontation, speech clear  Skin: No rashes      Results Reviewed:  LAB RESULTS:      Lab 25  0344 25  0554 25  2135 25  1859 25  1842 25  0408 25  1807 25  1622   WBC 12.92* 11.64*  --  15.32*  --  13.89*  --  16.67*   HEMOGLOBIN 9.1* 9.4*  --  10.4*  --  9.6*  --  10.1*   HEMATOCRIT 28.1* 28.8*  --  32.1*  --  29.4*  --  30.4*   PLATELETS 727* 648*  --  687*  --  509*  --  493*   NEUTROS ABS 8.84* 7.69*  --   --   --  9.68*  --  12.88*   IMMATURE GRANS (ABS) 0.21* 0.17*  --   --   --  0.15*  --  0.09*   LYMPHS ABS 1.95 1.76  --   --   --  1.60  --  1.74   MONOS ABS 1.18* 1.16*  --   --   --  1.41*  --  1.50*   EOS ABS 0.69* 0.80*  --   --   --  0.99*  --  0.42*   MCV 81.9 81.8  --  82.5  --  81.2  --  79.8   SED RATE  --   --   --   --   --   --   --  113*   CRP  --   --   --   --   --   --  13.13*  --    PROCALCITONIN  --   --    --   --   --   --  0.09  --    HSTROP T  --   --  14*  --  12  --  12 11         Lab 01/13/25  0344 01/12/25  0555 01/11/25  1842 01/11/25  0408 01/09/25 1807 01/09/25  1622   SODIUM 131* 134* 132* 129*  --  127*   POTASSIUM 4.0 4.2 4.6  4.6 3.1*  --  3.4*   CHLORIDE 95* 98 97* 93*  --  90*   CO2 30.0* 29.0 25.0 27.0  --  27.0   ANION GAP 6.0 7.0 10.0 9.0  --  10.0   BUN 4* 3* 3* 3*  --  5*   CREATININE 0.26* 0.19* 0.26* 0.24*  --  0.31*   EGFR 123.6 133.3 123.6 126.0  --  118.4   GLUCOSE 98 99 115* 116*  --  131*   CALCIUM 8.5* 8.8 8.8 8.6  --  9.1   MAGNESIUM 2.0 2.2 1.8 1.8  --   --    PHOSPHORUS  --  3.6 1.8*  --   --   --    HEMOGLOBIN A1C  --   --   --   --   --  6.00*   TSH  --   --   --   --  0.160*  --          Lab 01/11/25 1842 01/09/25  1622   TOTAL PROTEIN 5.9* 6.1   ALBUMIN 3.0* 3.2*   GLOBULIN 2.9 2.9   ALT (SGPT) 51* 21   AST (SGOT) 101* 29   BILIRUBIN <0.2 0.3   ALK PHOS 434* 201*   LIPASE  --  18         Lab 01/11/25  2135 01/11/25  1842 01/09/25  1807 01/09/25  1622   PROBNP  --   --   --  264.7   HSTROP T 14* 12 12 11         Lab 01/09/25  1807   CHOLESTEROL 144   LDL CHOL 88   HDL CHOL 41   TRIGLYCERIDES 74             Brief Urine Lab Results  (Last result in the past 365 days)        Color   Clarity   Blood   Leuk Est   Nitrite   Protein   CREAT   Urine HCG        01/10/25 0730 Yellow   Clear   Negative   Negative   Negative   Negative                   Microbiology Results Abnormal       None            No radiology results from the last 24 hrs    Results for orders placed during the hospital encounter of 01/09/25    Adult Transthoracic Echo Complete W/ Cont if Necessary Per Protocol    Interpretation Summary    Left ventricular systolic function is hyperdynamic (EF > 70%).    No hemodynamically significant valvular disease present.    The right ventricular cavity is mildly dilated.    Estimated right ventricular systolic pressure from tricuspid regurgitation is normal (<35 mmHg).    There  is a left pleural effusion.      Current medications:  Scheduled Meds:Pharmacy Consult, , Not Applicable, BID  apixaban, 5 mg, Oral, Q12H  cefTRIAXone, 2,000 mg, Intravenous, Q24H  dilTIAZem CD, 180 mg, Oral, Q24H  Fluticasone Furoate-Vilanterol, 1 puff, Inhalation, Daily - RT  ipratropium-albuterol, 3 mL, Nebulization, 4x Daily - RT  lactated ringers, 500 mL, Intravenous, Once  melatonin, 5 mg, Oral, Nightly  metoprolol tartrate, 25 mg, Oral, Q12H  minocycline, 100 mg, Oral, Q12H  pantoprazole, 40 mg, Oral, Nightly  predniSONE, 20 mg, Oral, Daily With Breakfast  sodium chloride, 10 mL, Intravenous, Q12H      Continuous Infusions:Pharmacy Consult,       PRN Meds:.  acetaminophen **OR** acetaminophen **OR** acetaminophen    Albuterol Sulfate NEB Orderable    benzonatate    senna-docusate sodium **AND** polyethylene glycol **AND** bisacodyl **AND** bisacodyl    Calcium Replacement - Follow Nurse / BPA Driven Protocol    Halls Cough Drops    Hydrocod Varun-Chlorphe Varun ER    LORazepam    Magnesium Standard Dose Replacement - Follow Nurse / BPA Driven Protocol    nitroglycerin    Pharmacy Consult    Phosphorus Replacement - Follow Nurse / BPA Driven Protocol    Potassium Replacement - Follow Nurse / BPA Driven Protocol    sodium chloride    sodium chloride    sodium chloride    Assessment & Plan   Assessment & Plan     Active Hospital Problems    Diagnosis  POA    **Multifocal pneumonia [J18.9]  Yes    Atrial tachycardia [I47.19]  No    Severe protein-calorie malnutrition [E43]  Yes    Atrial fibrillation/flutter [I48.91]  Yes    Heart failure with improved ejection fraction (HFimpEF) [I50.32]  Yes    Aortic atherosclerosis [I70.0]  Yes    Emphysema of lung [J43.9]  Yes    Asthma-COPD overlap syndrome [J44.89]  Yes    Anxiety disorder [F41.9]  Yes    Hyponatremia [E87.1]  Yes    Hypokalemia [E87.6]  Yes    Protein calorie malnutrition [E46]  Yes    Cachexia [R64]  Yes    End stage COPD [J44.9]  Yes    Hypothyroidism  (acquired) [E03.9]  Yes    Mycobacterium avium complex [A31.0]  Yes    Chronic bronchitis [J42]  Yes    Mycobacterium infection [A31.9]  Yes      Resolved Hospital Problems    Diagnosis Date Resolved POA    Pneumonia [J18.9] 01/11/2025 Yes    T2DM (type 2 diabetes mellitus) [E11.9] 01/11/2025 Yes        Brief Hospital Course to date:  Kristen Jean is a 64 y.o. female  with a PMH significant for pulmonary Mycobacterium avium complex diagnosed 2015, end-stage COPD, COPD/asthma overlap syndrome, advanced emphysema, anxiety and protein calorie malnutrition with COPD cachexia who presented to [facility] ED secondary to worsening shortness of breath, higher oxygen requirements, right-sided pleuritic chest pain, pain in lower chest on the right side and was found to have multifocal PNA.    This patient's problems and plans were partially entered by my partner and updated as appropriate by me 01/13/25.    Plan:     Pneumonia  Multifocal pneumonia  Mycobacterium avium complex  End-stage COPD  Asthma - COPD overlap syndrome  Emphysema of the lung  Chronic bronchitis  Patient has a history that is significant for advanced COPD with a history of asthma - COPD overlap syndrome, follows [facility] pulmonology  - Does have a history of Mycobacterium AVM complex, appears on CT that her previous RLL nodule has decreased in size  - CT chest without contrast revealed multifocal pneumonia more pronounced RML/RLL  - MRSA swab, streptococcal Legionella urine antigens negative, respiratory culture PENDING   -- respiratory viral PCR panel negative  - Consulted ID,  will require guidance on antimicrobial regimen and duration. Appreciate Dr. Wasserman's assistance. Continue Rocephin/Minocycline per his recs.   - Consulted pulmonology due to extent of pulmonary disease  - DuoNebs, Tessalon Perles and albuterol inhaler  - Continue nasal cannula oxygen and wean as tolerated     Hyponatremia  Hypokalemia  Suspect SIADH in the  setting of pneumonia versus hypovolemic hyponatremia  - s/p IVFs  -- Na+ stable  - Potassium replacement protocol in place    New Onset Afib with RVR  ?SVT  -- started dilt gtt, continue as BP allows  -- consult Cardiology given new diagnosis, appreciate their assistance  -- started on Eliquis, [id number]  -- given IV digoxin but remained tachycardic   -- EKG repeated and concern for SVT   -- pt refused IV Adenosine initially, however, eventually agreed. Briefly HR was better, but then went back to 160s.  Resumed dilt gtt at that point.   -- now off dilt gtt and in NSR  -- continue with Metoprolol and PO Diltiazem as per Cardiology  -- TTE with normal EF      Protein calorie malnutrition  Cachexia of end-stage lung disease  Patient has low albumin, appears cachectic likely secondary to advanced lung disease  - Nutrition consult for assessment of malnutrition     Goals of care  -- pt still full code for time being.  Will continue to discuss with her given her poor overall prognosis      Low TSH  -- Likely subclinical thyroiditis with low TSH and normal free T4, in the setting of pneumonia  -- Will need repeat of thyroid function in 6 to 8 weeks.     T2DM/prediabetes   HbA1c 6%   LDL 88    Anxiety  --  reordered her benzo, she requested 0.25 mg QID (vs 0.5 mg BID at home).      Total time spent: Time Spent: Time Spent: 35 minutes  Time spent includes time reviewing chart, face-to-face time, counseling patient/family/caregiver, ordering medications/tests/procedures, communicating with other health care professionals, documenting clinical information in the electronic health record, and coordination of care.      Expected Discharge Location and Transportation: home   Expected Discharge   Expected Discharge Date: 1/14/2025; Expected Discharge Time:      VTE Prophylaxis:  Pharmacologic & mechanical VTE prophylaxis orders are present.         AM-PAC 6 Clicks Score (PT): 24 (01/13/25 0800)    CODE STATUS:   Code Status  and Medical Interventions: CPR (Attempt to Resuscitate); Full Support   Ordered at: 01/09/25 2009     Level Of Support Discussed With:    Patient     Code Status (Patient has no pulse and is not breathing):    CPR (Attempt to Resuscitate)     Medical Interventions (Patient has pulse or is breathing):    Full Support       Franchesca Stewart MD  01/13/25

## 2025-01-14 LAB
ANION GAP SERPL CALCULATED.3IONS-SCNC: 6 MMOL/L (ref 5–15)
BASOPHILS # BLD AUTO: 0.06 10*3/MM3 (ref 0–0.2)
BASOPHILS NFR BLD AUTO: 0.4 % (ref 0–1.5)
BUN SERPL-MCNC: 4 MG/DL (ref 8–23)
BUN/CREAT SERPL: 18.2 (ref 7–25)
CALCIUM SPEC-SCNC: 9.1 MG/DL (ref 8.6–10.5)
CHLORIDE SERPL-SCNC: 93 MMOL/L (ref 98–107)
CO2 SERPL-SCNC: 31 MMOL/L (ref 22–29)
CREAT SERPL-MCNC: 0.22 MG/DL (ref 0.57–1)
DEPRECATED RDW RBC AUTO: 44.9 FL (ref 37–54)
EGFRCR SERPLBLD CKD-EPI 2021: 128.6 ML/MIN/1.73
EOSINOPHIL # BLD AUTO: 0.88 10*3/MM3 (ref 0–0.4)
EOSINOPHIL NFR BLD AUTO: 5.2 % (ref 0.3–6.2)
ERYTHROCYTE [DISTWIDTH] IN BLOOD BY AUTOMATED COUNT: 15 % (ref 12.3–15.4)
GLUCOSE SERPL-MCNC: 93 MG/DL (ref 65–99)
HCT VFR BLD AUTO: 30.6 % (ref 34–46.6)
HGB BLD-MCNC: 9.8 G/DL (ref 12–15.9)
IMM GRANULOCYTES # BLD AUTO: 0.3 10*3/MM3 (ref 0–0.05)
IMM GRANULOCYTES NFR BLD AUTO: 1.8 % (ref 0–0.5)
LYMPHOCYTES # BLD AUTO: 2.28 10*3/MM3 (ref 0.7–3.1)
LYMPHOCYTES NFR BLD AUTO: 13.4 % (ref 19.6–45.3)
MAGNESIUM SERPL-MCNC: 2.1 MG/DL (ref 1.6–2.4)
MCH RBC QN AUTO: 26.4 PG (ref 26.6–33)
MCHC RBC AUTO-ENTMCNC: 32 G/DL (ref 31.5–35.7)
MCV RBC AUTO: 82.5 FL (ref 79–97)
MONOCYTES # BLD AUTO: 1.32 10*3/MM3 (ref 0.1–0.9)
MONOCYTES NFR BLD AUTO: 7.7 % (ref 5–12)
NEUTROPHILS NFR BLD AUTO: 12.23 10*3/MM3 (ref 1.7–7)
NEUTROPHILS NFR BLD AUTO: 71.5 % (ref 42.7–76)
NRBC BLD AUTO-RTO: 0 /100 WBC (ref 0–0.2)
PLATELET # BLD AUTO: 816 10*3/MM3 (ref 140–450)
PMV BLD AUTO: 8.9 FL (ref 6–12)
POTASSIUM SERPL-SCNC: 4.1 MMOL/L (ref 3.5–5.2)
RBC # BLD AUTO: 3.71 10*6/MM3 (ref 3.77–5.28)
SODIUM SERPL-SCNC: 130 MMOL/L (ref 136–145)
WBC NRBC COR # BLD AUTO: 17.07 10*3/MM3 (ref 3.4–10.8)

## 2025-01-14 PROCEDURE — 94664 DEMO&/EVAL PT USE INHALER: CPT

## 2025-01-14 PROCEDURE — 83735 ASSAY OF MAGNESIUM: CPT | Performed by: INTERNAL MEDICINE

## 2025-01-14 PROCEDURE — 80048 BASIC METABOLIC PNL TOTAL CA: CPT | Performed by: INTERNAL MEDICINE

## 2025-01-14 PROCEDURE — 85025 COMPLETE CBC W/AUTO DIFF WBC: CPT | Performed by: INTERNAL MEDICINE

## 2025-01-14 PROCEDURE — 63710000001 PREDNISONE PER 1 MG: Performed by: INTERNAL MEDICINE

## 2025-01-14 PROCEDURE — 25010000002 CEFTRIAXONE PER 250 MG: Performed by: PHYSICIAN ASSISTANT

## 2025-01-14 PROCEDURE — 94761 N-INVAS EAR/PLS OXIMETRY MLT: CPT

## 2025-01-14 PROCEDURE — 94799 UNLISTED PULMONARY SVC/PX: CPT

## 2025-01-14 PROCEDURE — 99231 SBSQ HOSP IP/OBS SF/LOW 25: CPT | Performed by: INTERNAL MEDICINE

## 2025-01-14 PROCEDURE — 99232 SBSQ HOSP IP/OBS MODERATE 35: CPT | Performed by: NURSE PRACTITIONER

## 2025-01-14 RX ORDER — DILTIAZEM HYDROCHLORIDE 180 MG/1
180 CAPSULE, COATED, EXTENDED RELEASE ORAL
Qty: 90 CAPSULE | Refills: 0 | Status: SHIPPED | OUTPATIENT
Start: 2025-01-14

## 2025-01-14 RX ORDER — METOPROLOL TARTRATE 25 MG/1
25 TABLET, FILM COATED ORAL EVERY 12 HOURS SCHEDULED
Qty: 120 TABLET | Refills: 0 | Status: SHIPPED | OUTPATIENT
Start: 2025-01-14

## 2025-01-14 RX ADMIN — LORAZEPAM 0.25 MG: 0.5 TABLET ORAL at 20:30

## 2025-01-14 RX ADMIN — PANTOPRAZOLE SODIUM 40 MG: 40 TABLET, DELAYED RELEASE ORAL at 20:30

## 2025-01-14 RX ADMIN — IPRATROPIUM BROMIDE AND ALBUTEROL SULFATE 3 ML: 2.5; .5 SOLUTION RESPIRATORY (INHALATION) at 12:57

## 2025-01-14 RX ADMIN — Medication 10 ML: at 20:31

## 2025-01-14 RX ADMIN — IPRATROPIUM BROMIDE AND ALBUTEROL SULFATE 3 ML: 2.5; .5 SOLUTION RESPIRATORY (INHALATION) at 09:28

## 2025-01-14 RX ADMIN — HYDROCODONE POLISTIREX AND CHLORPHENIRAMINE POLISTIREX 2.5 ML: 10; 8 SUSPENSION, EXTENDED RELEASE ORAL at 11:38

## 2025-01-14 RX ADMIN — LORAZEPAM 0.25 MG: 0.5 TABLET ORAL at 08:33

## 2025-01-14 RX ADMIN — PREDNISONE 20 MG: 20 TABLET ORAL at 08:34

## 2025-01-14 RX ADMIN — MINOCYCLINE HYDROCHLORIDE 100 MG: 50 CAPSULE ORAL at 08:34

## 2025-01-14 RX ADMIN — APIXABAN 5 MG: 5 TABLET, FILM COATED ORAL at 08:34

## 2025-01-14 RX ADMIN — DILTIAZEM HYDROCHLORIDE 180 MG: 180 CAPSULE, EXTENDED RELEASE ORAL at 08:34

## 2025-01-14 RX ADMIN — SODIUM CHLORIDE 2000 MG: 900 INJECTION INTRAVENOUS at 17:58

## 2025-01-14 RX ADMIN — BENZONATATE 100 MG: 100 CAPSULE ORAL at 08:33

## 2025-01-14 RX ADMIN — ALBUTEROL SULFATE 2.5 MG: 2.5 SOLUTION RESPIRATORY (INHALATION) at 05:26

## 2025-01-14 RX ADMIN — IPRATROPIUM BROMIDE AND ALBUTEROL SULFATE 3 ML: 2.5; .5 SOLUTION RESPIRATORY (INHALATION) at 19:54

## 2025-01-14 RX ADMIN — FLUTICASONE FUROATE AND VILANTEROL 1 PUFF: 200; 25 POWDER RESPIRATORY (INHALATION) at 19:54

## 2025-01-14 RX ADMIN — MINOCYCLINE HYDROCHLORIDE 100 MG: 50 CAPSULE ORAL at 20:30

## 2025-01-14 RX ADMIN — LORAZEPAM 0.25 MG: 0.5 TABLET ORAL at 00:05

## 2025-01-14 RX ADMIN — BENZONATATE 100 MG: 100 CAPSULE ORAL at 20:30

## 2025-01-14 RX ADMIN — APIXABAN 5 MG: 5 TABLET, FILM COATED ORAL at 20:31

## 2025-01-14 RX ADMIN — IPRATROPIUM BROMIDE AND ALBUTEROL SULFATE 3 ML: 2.5; .5 SOLUTION RESPIRATORY (INHALATION) at 16:21

## 2025-01-14 NOTE — PROGRESS NOTES
[facility] Medicine Services  PROGRESS NOTE    Patient Name: Kristen Jean  : 1960  MRN: [id number]    Date of Admission: 2025  Primary Care Physician: Moiz Nova MD    Subjective   Subjective     CC:  SOA    HPI:  Complaining of R sided pain with deep breaths. States that it had resolved but is now back.     Objective   Objective     Vital Signs:   Temp:  [97.6 °F (36.4 °C)-98.4 °F (36.9 °C)] 98 °F (36.7 °C)  Heart Rate:  [] 109  Resp:  [16-18] 18  BP: (102-143)/(48-62) 143/51  Flow (L/min) (Oxygen Therapy):  [3-4] 4     Physical Exam:  Constitutional: No acute distress, awake, alert, cachetic appearing WF in NAD  HENT: NCAT, mucous membranes moist  Respiratory: diffuse rhonchi bilaterally   Cardiovascular: RRR,  no murmurs, rubs, or gallops  Gastrointestinal: Positive bowel sounds, soft, nontender, nondistended  Musculoskeletal: No bilateral ankle edema  Psychiatric: Appropriate affect, cooperative  Neurologic: Oriented x 3, strength symmetric in all extremities, Cranial Nerves grossly intact to confrontation, speech clear  Skin: No rashes      Results Reviewed:  LAB RESULTS:      Lab 25  0427 25  0344 25  0554 25  2135 25  1859 25  1842 25  0408 25  1807 25  1622   WBC 17.07* 12.92* 11.64*  --  15.32*  --  13.89*  --  16.67*   HEMOGLOBIN 9.8* 9.1* 9.4*  --  10.4*  --  9.6*  --  10.1*   HEMATOCRIT 30.6* 28.1* 28.8*  --  32.1*  --  29.4*  --  30.4*   PLATELETS 816* 727* 648*  --  687*  --  509*  --  493*   NEUTROS ABS 12.23* 8.84* 7.69*  --   --   --  9.68*  --  12.88*   IMMATURE GRANS (ABS) 0.30* 0.21* 0.17*  --   --   --  0.15*  --  0.09*   LYMPHS ABS 2.28 1.95 1.76  --   --   --  1.60  --  1.74   MONOS ABS 1.32* 1.18* 1.16*  --   --   --  1.41*  --  1.50*   EOS ABS 0.88* 0.69* 0.80*  --   --   --  0.99*  --  0.42*   MCV 82.5 81.9 81.8  --  82.5  --  81.2  --  79.8   SED RATE  --   --   --   --   --   --    --   --  113*   CRP  --   --   --   --   --   --   --  13.13*  --    PROCALCITONIN  --   --   --   --   --   --   --  0.09  --    HSTROP T  --   --   --  14*  --  12  --  12 11         Lab 01/14/25  0427 01/13/25  0344 01/12/25  0555 01/11/25  1842 01/11/25  0408 01/09/25 1807 01/09/25  1622   SODIUM 130* 131* 134* 132* 129*  --  127*   POTASSIUM 4.1 4.0 4.2 4.6  4.6 3.1*  --  3.4*   CHLORIDE 93* 95* 98 97* 93*  --  90*   CO2 31.0* 30.0* 29.0 25.0 27.0  --  27.0   ANION GAP 6.0 6.0 7.0 10.0 9.0  --  10.0   BUN 4* 4* 3* 3* 3*  --  5*   CREATININE 0.22* 0.26* 0.19* 0.26* 0.24*  --  0.31*   EGFR 128.6 123.6 133.3 123.6 126.0  --  118.4   GLUCOSE 93 98 99 115* 116*  --  131*   CALCIUM 9.1 8.5* 8.8 8.8 8.6  --  9.1   MAGNESIUM 2.1 2.0 2.2 1.8 1.8  --   --    PHOSPHORUS  --   --  3.6 1.8*  --   --   --    HEMOGLOBIN A1C  --   --   --   --   --   --  6.00*   TSH  --   --   --   --   --  0.160*  --          Lab 01/11/25 1842 01/09/25  1622   TOTAL PROTEIN 5.9* 6.1   ALBUMIN 3.0* 3.2*   GLOBULIN 2.9 2.9   ALT (SGPT) 51* 21   AST (SGOT) 101* 29   BILIRUBIN <0.2 0.3   ALK PHOS 434* 201*   LIPASE  --  18         Lab 01/11/25  2135 01/11/25  1842 01/09/25 1807 01/09/25  1622   PROBNP  --   --   --  264.7   HSTROP T 14* 12 12 11         Lab 01/09/25  1807   CHOLESTEROL 144   LDL CHOL 88   HDL CHOL 41   TRIGLYCERIDES 74             Brief Urine Lab Results  (Last result in the past 365 days)        Color   Clarity   Blood   Leuk Est   Nitrite   Protein   CREAT   Urine HCG        01/10/25 0730 Yellow   Clear   Negative   Negative   Negative   Negative                   Microbiology Results Abnormal       None            No radiology results from the last 24 hrs    Results for orders placed during the hospital encounter of 01/09/25    Adult Transthoracic Echo Complete W/ Cont if Necessary Per Protocol    Interpretation Summary    Left ventricular systolic function is hyperdynamic (EF > 70%).    No hemodynamically significant  valvular disease present.    The right ventricular cavity is mildly dilated.    Estimated right ventricular systolic pressure from tricuspid regurgitation is normal (<35 mmHg).    There is a left pleural effusion.      Current medications:  Scheduled Meds:Pharmacy Consult, , Not Applicable, BID  apixaban, 5 mg, Oral, Q12H  cefTRIAXone, 2,000 mg, Intravenous, Q24H  dilTIAZem CD, 180 mg, Oral, Q24H  Fluticasone Furoate-Vilanterol, 1 puff, Inhalation, Daily - RT  ipratropium-albuterol, 3 mL, Nebulization, 4x Daily - RT  lactated ringers, 500 mL, Intravenous, Once  melatonin, 5 mg, Oral, Nightly  metoprolol tartrate, 25 mg, Oral, Q12H  minocycline, 100 mg, Oral, Q12H  pantoprazole, 40 mg, Oral, Nightly  Pharmacy Meds to Bed Consult, , Not Applicable, Daily  predniSONE, 20 mg, Oral, Daily With Breakfast  sodium chloride, 10 mL, Intravenous, Q12H      Continuous Infusions:Pharmacy Consult,       PRN Meds:.  acetaminophen **OR** acetaminophen **OR** acetaminophen    Albuterol Sulfate NEB Orderable    benzonatate    senna-docusate sodium **AND** polyethylene glycol **AND** bisacodyl **AND** bisacodyl    Calcium Replacement - Follow Nurse / BPA Driven Protocol    Halls Cough Drops    Hydrocod Varun-Chlorphe Varun ER    LORazepam    Magnesium Standard Dose Replacement - Follow Nurse / BPA Driven Protocol    nitroglycerin    Pharmacy Consult    Phosphorus Replacement - Follow Nurse / BPA Driven Protocol    Potassium Replacement - Follow Nurse / BPA Driven Protocol    sodium chloride    sodium chloride    sodium chloride    Assessment & Plan   Assessment & Plan     Active Hospital Problems    Diagnosis  POA    **Multifocal pneumonia [J18.9]  Yes    Atrial tachycardia [I47.19]  No    Severe protein-calorie malnutrition [E43]  Yes    Atrial fibrillation/flutter [I48.91]  Yes    Heart failure with improved ejection fraction (HFimpEF) [I50.32]  Yes    Aortic atherosclerosis [I70.0]  Yes    Emphysema of lung [J43.9]  Yes     Asthma-COPD overlap syndrome [J44.89]  Yes    Anxiety disorder [F41.9]  Yes    Hyponatremia [E87.1]  Yes    Hypokalemia [E87.6]  Yes    Protein calorie malnutrition [E46]  Yes    Cachexia [R64]  Yes    End stage COPD [J44.9]  Yes    Hypothyroidism (acquired) [E03.9]  Yes    Mycobacterium avium complex [A31.0]  Yes    Chronic bronchitis [J42]  Yes    Mycobacterium infection [A31.9]  Yes      Resolved Hospital Problems    Diagnosis Date Resolved POA    Pneumonia [J18.9] 01/11/2025 Yes    T2DM (type 2 diabetes mellitus) [E11.9] 01/11/2025 Yes        Brief Hospital Course to date:  Kristen Jean is a 64 y.o. female  with a PMH significant for pulmonary Mycobacterium avium complex diagnosed 2015, end-stage COPD, COPD/asthma overlap syndrome, advanced emphysema, anxiety and protein calorie malnutrition with COPD cachexia who presented to [facility] ED secondary to worsening shortness of breath, higher oxygen requirements, right-sided pleuritic chest pain, pain in lower chest on the right side and was found to have multifocal PNA.    This patient's problems and plans were partially entered by my partner and updated as appropriate by me 01/14/25.    Plan:     Pneumonia  Multifocal pneumonia  Mycobacterium avium complex  End-stage COPD  Asthma - COPD overlap syndrome  Emphysema of the lung  Chronic bronchitis  Patient has a history that is significant for advanced COPD with a history of asthma - COPD overlap syndrome, follows [facility] pulmonology  - Does have a history of Mycobacterium AVM complex, appears on CT that her previous RLL nodule has decreased in size  - CT chest without contrast revealed multifocal pneumonia more pronounced RML/RLL  - MRSA swab, streptococcal Legionella urine antigens negative, respiratory culture PENDING   -- respiratory viral PCR panel negative  - Consulted ID,  will require guidance on antimicrobial regimen and duration. Appreciate Dr. Wasserman's assistance. Continue  Rocephin/Minocycline per his recs. Plan for solely Minocycline upon d/c   - Consulted pulmonology due to extent of pulmonary disease  - DuoNebs, Tessalon Perles and albuterol inhaler  - Continue nasal cannula oxygen and wean as tolerated     Hyponatremia  Hypokalemia  Suspect SIADH in the setting of pneumonia versus hypovolemic hyponatremia  - s/p IVFs  -- Na+ stable  - Potassium replacement protocol in place    New Onset Afib with RVR  ?SVT  -- started dilt gtt, continue as BP allows  -- consult Cardiology given new diagnosis, appreciate their assistance  -- started on Eliquis, [id number]  -- given IV digoxin but remained tachycardic   -- EKG repeated and concern for SVT   -- pt refused IV Adenosine initially, however, eventually agreed. Briefly HR was better, but then went back to 160s.  Resumed dilt gtt at that point.   -- now off dilt gtt and in NSR  -- continue with Metoprolol and PO Diltiazem as per Cardiology  -- TTE with normal EF      Protein calorie malnutrition  Cachexia of end-stage lung disease  Patient has low albumin, appears cachectic likely secondary to advanced lung disease  - Nutrition consult for assessment of malnutrition     Goals of care  -- pt still full code for time being.  Will continue to discuss with her given her poor overall prognosis      Low TSH  -- Likely subclinical thyroiditis with low TSH and normal free T4, in the setting of pneumonia  -- Will need repeat of thyroid function in 6 to 8 weeks.     T2DM/prediabetes   HbA1c 6%   LDL 88    Anxiety  --  reordered her benzo, she requested 0.25 mg QID (vs 0.5 mg BID at home).      Total time spent: Time Spent: Time Spent: 25 minutes  Time spent includes time reviewing chart, face-to-face time, counseling patient/family/caregiver, ordering medications/tests/procedures, communicating with other health care professionals, documenting clinical information in the electronic health record, and coordination of care.      Expected Discharge  Location and Transportation: home tomorrow  Expected Discharge   Expected Discharge Date: 1/15/2025; Expected Discharge Time:      VTE Prophylaxis:  Pharmacologic & mechanical VTE prophylaxis orders are present.         AM-PAC 6 Clicks Score (PT): 20 (01/13/25 2030)    CODE STATUS:   Code Status and Medical Interventions: CPR (Attempt to Resuscitate); Full Support   Ordered at: 01/09/25 2009     Level Of Support Discussed With:    Patient     Code Status (Patient has no pulse and is not breathing):    CPR (Attempt to Resuscitate)     Medical Interventions (Patient has pulse or is breathing):    Full Support       Franchesca Stewart MD  01/14/25

## 2025-01-14 NOTE — PLAN OF CARE
Problem: Adult Inpatient Plan of Care  Goal: Plan of Care Review  Outcome: Progressing  Flowsheets (Taken 1/14/2025 1833)  Progress: improving  Plan of Care Reviewed With: patient  Goal: Patient-Specific Goal (Individualized)  Outcome: Progressing  Goal: Absence of Hospital-Acquired Illness or Injury  Outcome: Progressing  Intervention: Identify and Manage Fall Risk  Recent Flowsheet Documentation  Taken 1/14/2025 0800 by Roberto Apodaca RN  Safety Promotion/Fall Prevention:   activity supervised   nonskid shoes/slippers when out of bed   room organization consistent   safety round/check completed  Intervention: Prevent Skin Injury  Recent Flowsheet Documentation  Taken 1/14/2025 0800 by Roberto Apodaca RN  Body Position:   position changed independently   weight shifting  Skin Protection:   drying agents applied   transparent dressing maintained  Intervention: Prevent and Manage VTE (Venous Thromboembolism) Risk  Recent Flowsheet Documentation  Taken 1/14/2025 0800 by Roberto Apodaca RN  VTE Prevention/Management: (Patient is is on eliquis) other (see comments)  Intervention: Prevent Infection  Recent Flowsheet Documentation  Taken 1/14/2025 0800 by Roberto Apodaca RN  Infection Prevention:   environmental surveillance performed   equipment surfaces disinfected   hand hygiene promoted   personal protective equipment utilized   rest/sleep promoted   single patient room provided  Goal: Optimal Comfort and Wellbeing  Outcome: Progressing  Intervention: Provide Person-Centered Care  Recent Flowsheet Documentation  Taken 1/14/2025 0800 by Roberto Apodaca RN  Trust Relationship/Rapport:   care explained   choices provided   emotional support provided   empathic listening provided   questions answered   questions encouraged   reassurance provided   thoughts/feelings acknowledged  Goal: Readiness for Transition of Care  Outcome: Progressing     Problem: Fall Injury Risk  Goal: Absence of Fall and Fall-Related  Injury  Outcome: Progressing  Intervention: Identify and Manage Contributors  Recent Flowsheet Documentation  Taken 1/14/2025 0800 by Roberto Apodaca, RN  Medication Review/Management: medications reviewed  Self-Care Promotion:   BADL personal objects within reach   BADL personal routines maintained   adaptive equipment use encouraged  Intervention: Promote Injury-Free Environment  Recent Flowsheet Documentation  Taken 1/14/2025 0800 by Roberto Apodaca, RN  Safety Promotion/Fall Prevention:   activity supervised   nonskid shoes/slippers when out of bed   room organization consistent   safety round/check completed   Goal Outcome Evaluation:  Plan of Care Reviewed With: patient        Progress: improving

## 2025-01-14 NOTE — PROGRESS NOTES
INFECTIOUS DISEASE Progress Note    Kristen Jean  1960  [id number]    Admission Date: 1/9/2025      Requesting Provider: Rosi Medina MD  Evaluating Physician: Az Wasserman MD    Reason for Consultation: Multifocal pneumonia with h/o MAC on chronic therapy    History of present illness:     1/10/2025:Patient is a 64 y.o. female with pulmonary Mycobacterium avium infection 2015/on chronic therapy, ES COPD/3L O2 baseline, and protein calorie malnutrition who presented to [facility] ED on 1/9/25 with worsening shortness of breath with right pleuritic chest pain, and higher oxygen requirements over the last week prior to admission. About 2 days ago, the patient bent over and she felt like she tore something in her right lower chest.  She quit smoking 11/2024, but all her neighbors smoke in the apartment complex.  The past week, she has needed 5-6 L/min O2 and is still satting at 93% on this oxygen setting.  She follow pulmonology and ID at [facility].  She has been noncompliant with her antibiotics for BONI and rotating taking Levaquin, Azithromycin and Rifampin because of complaints of side effects.   She denies fever, chills, nausea, vomiting, diarrhea, or dysuria.  She has remained afebrile with initial tachycardia. Initial labs were , PCT 0.09, WBC 16,700 with 77% neutrophils, Na 127, K 3.4, creatinine 0.31, CRP 13.13, and CPK 52.  A respiratory panel PCR was negative.  Urinary antigens for Strep pneumo and Legionella were negative. Nasal MRSA PCR is negative. A CT scan of chest without contrast showed right lung multifocal pneumonia, decrease in size of RLL pulmonary nodule.  She is currently on Merrem and Zyvox.  ID was asked to evaluate and manage her antibiotic therapy.    She has been noncompliant with follow-up.  Her last ID visit at  was in 5/24.  She has been noncompliant with her azithromycin, rifampin, and Levaquin as she takes these medications intermittently.  She states that she  has not received an RSV vaccine, Prevnar 20 pneumococcal vaccine, influenza vaccine, or COVID-19 vaccine booster     1/11/2025:  She has remained afebrile.  White blood cell count is 13.9.   MRSA nasal PCR was negative.  Urinary Legionella and pneumococcal antigens were negative.  O2 saturation is 90% on 3 L.    He complains of pleuritic right chest pain.  She complains of dyspnea.  She has minimal sputum production.   she refused taking the doxycycline due to prior nausea on doxycycline.  She is agreeable to trying minocycline.  She will need to take the minocycline with food but not any calcium containing food within 1 to 2 hours of the capsules.  I discussed this issue with her today in detail     1/12/2025: She remains afebrile. White blood cell count is 11.6.   She feels better today.  She has decreased right pleuritic chest pain.  She denies increased cough and sputum production.  She denies nausea vomiting. She denies increased dyspnea.  She is tolerating the minocycline with no nausea.     1/13/2025:   She remains afebrile.  White blood cell count is 12.9.  Creatinine is 0.26.  Sodium is 131.  She is on 3 L of oxygen with an O2 saturation of 94%.   She continues to feel better.  Her right pleuritic chest pain is resolved.  She denies increased dyspnea and cough.  She denies hemoptysis    1/14/2025:  She remains afebrile.  Creatinine is 0.22.  White blood cell count is 17.1.  She is on prednisone at 20 mg/day. .  O2 saturation is 95% on 4 L.   She complains of some residual right pleuritic chest pain.  She has a cough with minimal sputum production.    Past Medical History:   Diagnosis Date    Anxiety disorder 01/09/2025    Asthma-COPD overlap syndrome 01/09/2025    Breast injury     Cachexia 01/09/2025    Chronic bronchitis 08/11/2016    Emphysema of lung 01/09/2025    Hypothyroidism (acquired) 01/09/2025    Mycobacterium infection 08/11/2016    T2DM (type 2 diabetes mellitus) 01/09/2025       Past Surgical  History:   Procedure Laterality Date    TOOTH EXTRACTION      1990 and 2002       Family History   Problem Relation Age of Onset    Dementia Father     Breast cancer Neg Hx     Ovarian cancer Neg Hx        Social History     Socioeconomic History    Marital status: Single   Tobacco Use    Smoking status: Every Day   Vaping Use    Vaping status: Never Used   Substance and Sexual Activity    Alcohol use: No    Drug use: Never    Sexual activity: Defer       Allergies   Allergen Reactions    Codeine     Fluticasone-Salmeterol     Penicillins          Medication:    Current Facility-Administered Medications:     !!! Please obtain patients home medications that are stored in central pharmacy (including Breo inhaler in [facility]) and return to patient prior to discharge!, , Not Applicable, BID, Nicole Ch, PharmD, Given at 01/10/25 2337    acetaminophen (TYLENOL) tablet 650 mg, 650 mg, Oral, Q4H PRN **OR** acetaminophen (TYLENOL) 160 MG/5ML oral solution 650 mg, 650 mg, Oral, Q4H PRN **OR** acetaminophen (TYLENOL) suppository 650 mg, 650 mg, Rectal, Q4H PRN, Rosi Medina MD    albuterol (PROVENTIL) nebulizer solution 0.083% 2.5 mg/3mL, 2.5 mg, Nebulization, Q4H PRN, Luis Lanier MD, 2.5 mg at 01/14/25 0526    apixaban (ELIQUIS) tablet 5 mg, 5 mg, Oral, Q12H, Wayne Salcedo IV, MD, 5 mg at 01/13/25 2123    benzonatate (TESSALON) capsule 100 mg, 100 mg, Oral, TID PRN, Rosi Medina MD, 100 mg at 01/13/25 2123    sennosides-docusate (PERICOLACE) 8.6-50 MG per tablet 2 tablet, 2 tablet, Oral, BID PRN **AND** polyethylene glycol (MIRALAX) packet 17 g, 17 g, Oral, Daily PRN **AND** bisacodyl (DULCOLAX) EC tablet 5 mg, 5 mg, Oral, Daily PRN **AND** bisacodyl (DULCOLAX) suppository 10 mg, 10 mg, Rectal, Daily PRN, Rosi Medina MD    Calcium Replacement - Follow Nurse / BPA Driven Protocol, , Not Applicable, PRN, Rosi Medina MD    cefTRIAXone (ROCEPHIN) 2,000 mg in sodium chloride  0.9 % 100 mL MBP, 2,000 mg, Intravenous, Q24H, Anthony Hodges PA, Last Rate: 200 mL/hr at 01/13/25 1816, 2,000 mg at 01/13/25 1816    dilTIAZem CD (CARDIZEM CD) 24 hr capsule 180 mg, 180 mg, Oral, Q24H, Sherry Darden, APRN, 180 mg at 01/13/25 1219    Fluticasone Furoate-Vilanterol (BREO ELLIPTA) 200-25 MCG/ACT inhaler 1 puff **Patient Supplied Med**, 1 puff, Inhalation, Daily - RT, Dorian Sinha, [facility], 1 puff at 01/13/25 2305    [place] Cough Drops (lozenges), 1 lozenge, Buccal, Q2H PRN, Marlen Blevins PA-C, 1 lozenge at 01/11/25 1754    Hydrocod Varun-Chlorphe Varun ER (TUSSIONEX PENNKINETIC) 10-8 MG/5ML ER suspension 2.5 mL, 2.5 mL, Oral, Q12H PRN, Marlen Blevins PA-C, 2.5 mL at 01/11/25 0212    ipratropium-albuterol (DUO-NEB) nebulizer solution 3 mL, 3 mL, Nebulization, 4x Daily - RT, Rosi Medina MD, 3 mL at 01/13/25 2303    lactated ringers bolus 500 mL, 500 mL, Intravenous, Once, Rosi Medina MD    LORazepam (ATIVAN) tablet 0.25 mg, 0.25 mg, Oral, Q6H PRN, Franchesca Stewart MD, 0.25 mg at 01/14/25 0005    Magnesium Standard Dose Replacement - Follow Nurse / BPA Driven Protocol, , Not Applicable, PRN, Rosi Medina MD    melatonin tablet 5 mg, 5 mg, Oral, Nightly, Rosi Medina MD    metoprolol tartrate (LOPRESSOR) tablet 25 mg, 25 mg, Oral, Q12H, Wayne Salcedo IV, MD, 25 mg at 01/13/25 2123    minocycline (MINOCIN,DYNACIN) capsule 100 mg, 100 mg, Oral, Q12H, Az Wasserman MD, 100 mg at 01/13/25 2123    nitroglycerin (NITROSTAT) SL tablet 0.4 mg, 0.4 mg, Sublingual, Q5 Min PRN, Rosi Medina MD    pantoprazole (PROTONIX) EC tablet 40 mg, 40 mg, Oral, Nightly, Rosi Medina MD, 40 mg at 01/13/25 2123    Pharmacy Consult, , Not Applicable, Continuous PRN, Luis Lanier MD    Phosphorus Replacement - Follow Nurse / BPA Driven Protocol, , Not Applicable, PRN, Rosi Medina MD    Potassium Replacement - Follow Nurse / BPA Driven Protocol, , Not  Applicable, PRN, Rosi Medina MD    predniSONE (DELTASONE) tablet 20 mg, 20 mg, Oral, Daily With Breakfast, Luis Lanier MD, 20 mg at 01/13/25 0852    sodium chloride 0.9 % flush 10 mL, 10 mL, Intravenous, PRN, Rosi Medina MD    sodium chloride 0.9 % flush 10 mL, 10 mL, Intravenous, Q12H, Rosi Medina MD, 10 mL at 01/13/25 2124    sodium chloride 0.9 % flush 10 mL, 10 mL, Intravenous, PRN, Rosi Medina MD    sodium chloride 0.9 % infusion 40 mL, 40 mL, Intravenous, PRN, Rosi Medina MD    Antibiotics:  Anti-Infectives (From admission, onward)      Ordered     Dose/Rate Route Frequency Start Stop    01/11/25 1011  minocycline (MINOCIN,DYNACIN) capsule 100 mg        Ordering Provider: Az Wasserman MD    100 mg Oral Every 12 Hours Scheduled 01/11/25 1100 01/21/25 0859    01/10/25 1534  doxycycline (MONODOX) capsule 100 mg  Status:  Discontinued        Ordering Provider: Anthony Hodges PA    100 mg Oral Every 12 Hours Scheduled 01/10/25 2100 01/11/25 1010    01/10/25 1534  cefTRIAXone (ROCEPHIN) 2,000 mg in sodium chloride 0.9 % 100 mL MBP        Ordering Provider: Anthony Hodges PA    2,000 mg  200 mL/hr over 30 Minutes Intravenous Every 24 Hours 01/10/25 1800 01/24/25 1759    01/09/25 2014  meropenem (MERREM) 1,000 mg in sodium chloride 0.9 % 100 mL MBP  Status:  Discontinued        Ordering Provider: Rosi Medina MD    1,000 mg  over 3 Hours Intravenous Every 8 Hours 01/10/25 0600 01/10/25 1534    01/09/25 2014  Linezolid (ZYVOX) 600 mg 300 mL  Status:  Discontinued        Ordering Provider: Rosi Medina MD    600 mg  300 mL/hr over 60 Minutes Intravenous Every 12 Hours 01/10/25 0500 01/11/25 0846    01/09/25 2014  meropenem (MERREM) 1,000 mg in sodium chloride 0.9 % 100 mL MBP        Ordering Provider: Rosi Medina MD    1,000 mg  over 30 Minutes Intravenous Once 01/10/25 0000 01/10/25 0153    01/09/25 1717  Linezolid (ZYVOX) 600 mg 300 mL        Ordering  Provider: Ezequiel Eaton DO    600 mg  300 mL/hr over 60 Minutes Intravenous Once 25 1733 25 1843    25 1717  cefepime 2000 mg IVPB in 100 mL NS (MBP)        Ordering Provider: Ezequiel Eaton DO    2,000 mg  over 30 Minutes Intravenous Once 25 1733 25 1817              Review of Systems:  See HPI      Physical Exam:   Vital Signs  Temp (24hrs), Av.2 °F (36.8 °C), Min:97.6 °F (36.4 °C), Max:98.8 °F (37.1 °C)    Temp  Min: 97.6 °F (36.4 °C)  Max: 98.8 °F (37.1 °C)  BP  Min: 104/49  Max: 147/56  Pulse  Min: 73  Max: 111  Resp  Min: 16  Max: 20  SpO2  Min: 89 %  Max: 96 %    GENERAL: Ill and cachectic appearing.  HEENT: Normocephalic, atraumatic.  PERRL. EOMI. No conjunctival injection. No icterus. Oropharynx clear without evidence of thrush or exudate.    NECK: Supple   HEART: RRR; No murmur, rubs, gallops.   LUNGS:   Bilateral right greater than left crackles  ABDOMEN: Soft, nontender, nondistended. No rebound or guarding. NO mass or HSM.  EXT:  No cyanosis, clubbing or edema. No cord.  :  Without Ocasio catheter.  MSK: No joint effusions or erythema  SKIN: Warm and dry without cutaneous eruptions on Inspection/palpation.    NEURO: Oriented to PPT.  Motor 5/5 strength  PSYCHIATRIC: Normal insight and judgment. Cooperative with PE    Laboratory Data    Results from last 7 days   Lab Units 25  0427 25  0344 25  0554   WBC 10*3/mm3 17.07* 12.92* 11.64*   HEMOGLOBIN g/dL 9.8* 9.1* 9.4*   HEMATOCRIT % 30.6* 28.1* 28.8*   PLATELETS 10*3/mm3 816* 727* 648*     Results from last 7 days   Lab Units 25  0427   SODIUM mmol/L 130*   POTASSIUM mmol/L 4.1   CHLORIDE mmol/L 93*   CO2 mmol/L 31.0*   BUN mg/dL 4*   CREATININE mg/dL 0.22*   GLUCOSE mg/dL 93   CALCIUM mg/dL 9.1     Results from last 7 days   Lab Units 25  1842   ALK PHOS U/L 434*   BILIRUBIN mg/dL <0.2   ALT (SGPT) U/L 51*   AST (SGOT) U/L 101*     Results from last 7 days   Lab Units  01/09/25  1622   SED RATE mm/hr 113*     Results from last 7 days   Lab Units 01/09/25  1807   CRP mg/dL 13.13*         Results from last 7 days   Lab Units 01/09/25  1807   CK TOTAL U/L 52         Estimated Creatinine Clearance: 193 mL/min (A) (by C-G formula based on SCr of 0.22 mg/dL (L)).      Microbiology:  Microbiology Results (last 10 days)       Procedure Component Value - Date/Time    MRSA Screen, PCR (Inpatient) - Swab, Nares [[id number]]  (Normal) Collected: 01/10/25 1435    Lab Status: Final result Specimen: Swab from Nares Updated: 01/10/25 1607     MRSA PCR Negative    Narrative:      The negative predictive value of this diagnostic test is high and should only be used to consider de-escalating anti-MRSA therapy. A positive result may indicate colonization with MRSA and must be correlated clinically.  MRSA Negative    S. Pneumo Ag Urine or CSF - Urine, Urine, Clean Catch [[id number]]  (Normal) Collected: 01/10/25 0729    Lab Status: Final result Specimen: Urine, Clean Catch Updated: 01/10/25 1353     Strep Pneumo Ag Negative    Legionella Antigen, Urine - Urine, Urine, Clean Catch [[id number]]  (Normal) Collected: 01/10/25 0729    Lab Status: Final result Specimen: Urine, Clean Catch Updated: 01/10/25 1353     LEGIONELLA ANTIGEN, URINE Negative    Respiratory Panel PCR w/COVID-19(SARS-CoV-2) MARIANELA/BRONWYN/LUZ MARINA/PAD/COR/KINGS In-House, NP Swab in UTM/VTM, 2 HR TAT - Swab, Nasopharynx [[id number]]  (Normal) Collected: 01/10/25 0133    Lab Status: Final result Specimen: Swab from Nasopharynx Updated: 01/10/25 0238     ADENOVIRUS, PCR Not Detected     Coronavirus 229E Not Detected     Coronavirus HKU1 Not Detected     Coronavirus NL63 Not Detected     Coronavirus OC43 Not Detected     COVID19 Not Detected     Human Metapneumovirus Not Detected     Human Rhinovirus/Enterovirus Not Detected     Influenza A PCR Not Detected     Influenza B PCR Not Detected     Parainfluenza Virus 1 Not Detected     Parainfluenza Virus 2  Not Detected     Parainfluenza Virus 3 Not Detected     Parainfluenza Virus 4 Not Detected     RSV, PCR Not Detected     Bordetella pertussis pcr Not Detected     Bordetella parapertussis PCR Not Detected     Chlamydophila pneumoniae PCR Not Detected     Mycoplasma pneumo by PCR Not Detected    Narrative:      In the setting of a positive respiratory panel with a viral infection PLUS a negative procalcitonin without other underlying concern for bacterial infection, consider observing off antibiotics or discontinuation of antibiotics and continue supportive care. If the respiratory panel is positive for atypical bacterial infection (Bordetella pertussis, Chlamydophila pneumoniae, or Mycoplasma pneumoniae), consider antibiotic de-escalation to target atypical bacterial infection.                  Radiology:  Imaging Results (Last 72 Hours)       ** No results found for the last 72 hours. **         I read her radiographic images.      Impression:   Multifocal pneumonia- this is likely a bacterial pneumonia superimposed on her BONI.  I will leave her on intravenous Rocephin and oral minocycline.  Mycobacterium avium complex pulmonary infection -since 2016,  with therapeutic noncompliance.  She has been rotating Levaquin, azithromycin, and rifampin d/t side effects.  She is not compliant with ID follow up. She is at high risk for developing a resistant BONI strain that will make it difficult to treat in the future.   Leukocytosis/neutrophilia-this is partially secondary to her steroid therapy.  End stage chronic obstructive pulmonary disease/on 3L O2 baseline at home  Protein calorie malnutrition  Penicillin list allergy.  Seems to tolerate cephalosporins.  Medical noncompliance-this complicates all aspects of her care and increases her risk for poor outcome.     Hyponatremia-mild    PLAN/RECOMMENDATIONS:   Rocephin 2 gm IV daily  Minocycline 100 mg p.o. twice daily  Continue O2 support  Prevnar 20 pneumococcal  vaccination, influenza vaccination, RSV vaccination, and COVID-19 vaccine booster in the near future  Infectious disease follow-up at -I have advised her to be compliant with her follow-up appointments        I discussed her complex situation with Dr. Stewart today.  We will tentatively plan on discharge tomorrow on oral minocycline.        Az Wasserman MD  1/14/2025  07:31 EST

## 2025-01-14 NOTE — CASE MANAGEMENT/SOCIAL WORK
Continued Stay Note  [facility]     Patient Name: Kristen Jean  MRN: [id number]  Today's Date: 1/14/2025    Admit Date: 1/9/2025    Plan: Home   Discharge Plan       Row Name 01/14/25 0913       Plan    Plan Home    Patient/Family in Agreement with Plan yes    Plan Comments Spoke with patient at bedside. Plan is home. Patient is declining HH. Patient will need transport home. CM will continue to follow.    Final Discharge Disposition Code 01 - home or self-care                   Discharge Codes    No documentation.                 Expected Discharge Date and Time       Expected Discharge Date Expected Discharge Time    Jan 14, 2025               Slade Randle RN

## 2025-01-14 NOTE — PROGRESS NOTES
Cardiology Progress Note      Reason for visit:    Atrial flutter/fibrillation  SVT  Systolic heart failure with improved LVEF    IDENTIFICATION: 64-year-old female who resides in [place], [place]    Active Hospital Problems    Diagnosis  POA    **Multifocal pneumonia [J18.9]  Yes     Priority: High    Atrial tachycardia [I47.19]  No     Priority: High     Episode of SVT 1/11/2025 in the setting of multifocal pneumonia.  IV adenosine recommended but patient refused.  Converted spontaneously      Atrial fibrillation/flutter [I48.91]  Yes     Priority: High     First time diagnosis in the setting of multifocal pneumonia, 1/11/2025  [id number] (female, history of CHF, aortic athero)  Echo (1/12/2025): LVEF >70%.  No significant valve abnormality      Emphysema of lung [J43.9]  Yes     Priority: High    End stage COPD [J44.9]  Yes     Priority: High    Mycobacterium avium complex [A31.0]  Yes     Priority: High    Heart failure with improved ejection fraction (HFimpEF) [I50.32]  Yes     Priority: Medium     Echo at  (1/18/2024): LVEF 38% with wall motion abnormality consistent with Takotsubo (stress) cardiomyopathy  Reported normalization of LV systolic function on echo performed by Miquel Post at [facility]  Echo (1/12/2025): LVEF >70%.  No significant valve abnormality      Aortic atherosclerosis [I70.0]  Yes     Priority: Medium     Aortic atherosclerosis noted on chest CT, 1/2025      Chronic bronchitis [J42]  Yes     Priority: Medium    Asthma-COPD overlap syndrome [J44.89]  Yes     Priority: Low    Anxiety disorder [F41.9]  Yes     Priority: Low    Hyponatremia [E87.1]  Yes     Priority: Low    Hypokalemia [E87.6]  Yes     Priority: Low    Cachexia [R64]  Yes     Priority: Low    Hypothyroidism (acquired) [E03.9]  Yes     Priority: Low    Severe protein-calorie malnutrition [E43]  Yes    Protein calorie malnutrition [E46]  Yes    Mycobacterium infection [A31.9]  Yes            No  further episodes of atrial fibrillation/flutter or SVT.  The patient sitting up in the bed without complaints.  She is in normal sinus rhythm on telemetry.  She is on O2 at 4 L by nasal cannula           Vital Sign Min/Max for last 24 hours  Temp  Min: 97.6 °F (36.4 °C)  Max: 98.8 °F (37.1 °C)   BP  Min: 104/49  Max: 147/56   Pulse  Min: 73  Max: 111   Resp  Min: 16  Max: 20   SpO2  Min: 89 %  Max: 96 %   Flow (L/min) (Oxygen Therapy)  Min: 3  Max: 4      Intake/Output Summary (Last 24 hours) at 1/14/2025 0738  Last data filed at 1/14/2025 0500  Gross per 24 hour   Intake 360 ml   Output 500 ml   Net -140 ml           Physical Exam  Constitutional:       Appearance: She is well-developed.   HENT:      Head: Normocephalic.   Neck:      Vascular: No carotid bruit or JVD.   Cardiovascular:      Rate and Rhythm: Normal rate and regular rhythm.      Heart sounds: Normal heart sounds. No murmur heard.     No friction rub. No gallop.   Pulmonary:      Effort: Pulmonary effort is normal.      Breath sounds: Decreased breath sounds present.   Abdominal:      Palpations: Abdomen is soft.   Skin:     General: Skin is warm and dry.      Nails: There is no clubbing.   Neurological:      Mental Status: She is alert and oriented to person, place, and time.   Psychiatric:         Behavior: Behavior normal.         Tele: Normal sinus rhythm     Results Review (reviewed the patient's recent labs in the electronic medical record):      EKG (1/12/2025): Normal sinus rhythm with sinus arrhythmia     CT of the chest (1/9/2025): Right lung multifocal pneumonia.  Decreased right lower lobe pulmonary nodule.     Echo (1/12/2025): LVEF >70%.  No significant valvular abnormality       Results from last 7 days   Lab Units 01/14/25  0427 01/13/25  0344 01/12/25  0555 01/11/25  1842 01/11/25  0408 01/09/25  1622 01/09/25  1622   SODIUM mmol/L 130* 131* 134* 132* 129*  --  127*   POTASSIUM mmol/L 4.1 4.0 4.2 4.6  4.6 3.1*  --  3.4*   CHLORIDE  mmol/L 93* 95* 98 97* 93*  --  90*   BUN mg/dL 4* 4* 3* 3* 3*  --  5*   CREATININE mg/dL 0.22* 0.26* 0.19* 0.26* 0.24*  --  0.31*   MAGNESIUM mg/dL 2.1 2.0 2.2 1.8 1.8   < >  --     < > = values in this interval not displayed.       Results from last 7 days   Lab Units 01/11/25  2135 01/11/25  1842 01/09/25  1807   HSTROP T ng/L 14* 12 12       Results from last 7 days   Lab Units 01/14/25  0427 01/13/25  0344 01/12/25  0554   WBC 10*3/mm3 17.07* 12.92* 11.64*   HEMOGLOBIN g/dL 9.8* 9.1* 9.4*   HEMATOCRIT % 30.6* 28.1* 28.8*   PLATELETS 10*3/mm3 816* 727* 648*       Lab Results   Component Value Date    HGBA1C 6.00 (H) 01/09/2025       Lab Results   Component Value Date    CHOL 144 01/09/2025    TRIG 74 01/09/2025    HDL 41 01/09/2025    LDL 88 01/09/2025                  Atrial fibrillation/flutter/atrial tachycardia SVT  First time diagnosis in the setting of pneumonia  Noted on apixaban this admission  Replete potassium >4 and magnesium >2  Eliquis 5 mg twice daily for stroke prophylaxis  Diltiazem 180 mg daily  Metoprolol tartrate 25 mg twice daily     History of stress-induced cardiomyopathy  History of LVEF 38% in the setting of pneumonia 1/2024  Reported normalization of LV systolic function on subsequent echo at [facility]  Repeat echo is pending  proBNP and chest CT do not suggest heart failure observation.             Continue Eliquis for stroke prophylaxis  Home on diltiazem and metoprolol  Patient to follow-up with primary cardiologist in 4 weeks  Please call cardiology with any further questions we will sign    Electronically signed by NUNU Aguilera, 01/14/25, 7:38 AM EST.

## 2025-01-15 LAB
ANION GAP SERPL CALCULATED.3IONS-SCNC: 5 MMOL/L (ref 5–15)
BASOPHILS # BLD AUTO: 0.09 10*3/MM3 (ref 0–0.2)
BASOPHILS NFR BLD AUTO: 0.4 % (ref 0–1.5)
BUN SERPL-MCNC: 5 MG/DL (ref 8–23)
BUN/CREAT SERPL: 19.2 (ref 7–25)
CALCIUM SPEC-SCNC: 9.2 MG/DL (ref 8.6–10.5)
CHLORIDE SERPL-SCNC: 93 MMOL/L (ref 98–107)
CO2 SERPL-SCNC: 33 MMOL/L (ref 22–29)
CREAT SERPL-MCNC: 0.26 MG/DL (ref 0.57–1)
DEPRECATED RDW RBC AUTO: 45.4 FL (ref 37–54)
EGFRCR SERPLBLD CKD-EPI 2021: 123.6 ML/MIN/1.73
EOSINOPHIL # BLD AUTO: 1.07 10*3/MM3 (ref 0–0.4)
EOSINOPHIL NFR BLD AUTO: 4.9 % (ref 0.3–6.2)
ERYTHROCYTE [DISTWIDTH] IN BLOOD BY AUTOMATED COUNT: 15.1 % (ref 12.3–15.4)
GLUCOSE SERPL-MCNC: 105 MG/DL (ref 65–99)
HCT VFR BLD AUTO: 31.4 % (ref 34–46.6)
HGB BLD-MCNC: 10.1 G/DL (ref 12–15.9)
IMM GRANULOCYTES # BLD AUTO: 0.37 10*3/MM3 (ref 0–0.05)
IMM GRANULOCYTES NFR BLD AUTO: 1.7 % (ref 0–0.5)
LYMPHOCYTES # BLD AUTO: 2.49 10*3/MM3 (ref 0.7–3.1)
LYMPHOCYTES NFR BLD AUTO: 11.3 % (ref 19.6–45.3)
MCH RBC QN AUTO: 26.6 PG (ref 26.6–33)
MCHC RBC AUTO-ENTMCNC: 32.2 G/DL (ref 31.5–35.7)
MCV RBC AUTO: 82.6 FL (ref 79–97)
MONOCYTES # BLD AUTO: 1.31 10*3/MM3 (ref 0.1–0.9)
MONOCYTES NFR BLD AUTO: 6 % (ref 5–12)
NEUTROPHILS NFR BLD AUTO: 16.61 10*3/MM3 (ref 1.7–7)
NEUTROPHILS NFR BLD AUTO: 75.7 % (ref 42.7–76)
NRBC BLD AUTO-RTO: 0 /100 WBC (ref 0–0.2)
PLATELET # BLD AUTO: 866 10*3/MM3 (ref 140–450)
PMV BLD AUTO: 8.5 FL (ref 6–12)
POTASSIUM SERPL-SCNC: 4.2 MMOL/L (ref 3.5–5.2)
RBC # BLD AUTO: 3.8 10*6/MM3 (ref 3.77–5.28)
SODIUM SERPL-SCNC: 131 MMOL/L (ref 136–145)
WBC NRBC COR # BLD AUTO: 21.94 10*3/MM3 (ref 3.4–10.8)

## 2025-01-15 PROCEDURE — 97530 THERAPEUTIC ACTIVITIES: CPT

## 2025-01-15 PROCEDURE — 63710000001 PREDNISONE PER 1 MG: Performed by: INTERNAL MEDICINE

## 2025-01-15 PROCEDURE — 94664 DEMO&/EVAL PT USE INHALER: CPT

## 2025-01-15 PROCEDURE — 94799 UNLISTED PULMONARY SVC/PX: CPT

## 2025-01-15 PROCEDURE — 80048 BASIC METABOLIC PNL TOTAL CA: CPT | Performed by: INTERNAL MEDICINE

## 2025-01-15 PROCEDURE — 87081 CULTURE SCREEN ONLY: CPT | Performed by: INTERNAL MEDICINE

## 2025-01-15 PROCEDURE — 25010000002 CEFTRIAXONE PER 250 MG: Performed by: INTERNAL MEDICINE

## 2025-01-15 PROCEDURE — 97535 SELF CARE MNGMENT TRAINING: CPT

## 2025-01-15 PROCEDURE — 97116 GAIT TRAINING THERAPY: CPT

## 2025-01-15 PROCEDURE — 85025 COMPLETE CBC W/AUTO DIFF WBC: CPT | Performed by: INTERNAL MEDICINE

## 2025-01-15 PROCEDURE — 94761 N-INVAS EAR/PLS OXIMETRY MLT: CPT

## 2025-01-15 PROCEDURE — 99231 SBSQ HOSP IP/OBS SF/LOW 25: CPT | Performed by: INTERNAL MEDICINE

## 2025-01-15 RX ADMIN — BENZONATATE 100 MG: 100 CAPSULE ORAL at 09:05

## 2025-01-15 RX ADMIN — BENZONATATE 100 MG: 100 CAPSULE ORAL at 21:08

## 2025-01-15 RX ADMIN — METOPROLOL TARTRATE 25 MG: 25 TABLET, FILM COATED ORAL at 08:39

## 2025-01-15 RX ADMIN — IPRATROPIUM BROMIDE AND ALBUTEROL SULFATE 3 ML: 2.5; .5 SOLUTION RESPIRATORY (INHALATION) at 21:04

## 2025-01-15 RX ADMIN — ALBUTEROL SULFATE 2.5 MG: 2.5 SOLUTION RESPIRATORY (INHALATION) at 01:45

## 2025-01-15 RX ADMIN — IPRATROPIUM BROMIDE AND ALBUTEROL SULFATE 3 ML: 2.5; .5 SOLUTION RESPIRATORY (INHALATION) at 12:25

## 2025-01-15 RX ADMIN — APIXABAN 5 MG: 5 TABLET, FILM COATED ORAL at 21:08

## 2025-01-15 RX ADMIN — IPRATROPIUM BROMIDE AND ALBUTEROL SULFATE 3 ML: 2.5; .5 SOLUTION RESPIRATORY (INHALATION) at 07:44

## 2025-01-15 RX ADMIN — APIXABAN 5 MG: 5 TABLET, FILM COATED ORAL at 08:39

## 2025-01-15 RX ADMIN — Medication 10 ML: at 08:39

## 2025-01-15 RX ADMIN — IPRATROPIUM BROMIDE AND ALBUTEROL SULFATE 3 ML: 2.5; .5 SOLUTION RESPIRATORY (INHALATION) at 15:51

## 2025-01-15 RX ADMIN — FLUTICASONE FUROATE AND VILANTEROL 1 PUFF: 200; 25 POWDER RESPIRATORY (INHALATION) at 21:04

## 2025-01-15 RX ADMIN — PANTOPRAZOLE SODIUM 40 MG: 40 TABLET, DELAYED RELEASE ORAL at 21:08

## 2025-01-15 RX ADMIN — Medication 10 ML: at 21:09

## 2025-01-15 RX ADMIN — LORAZEPAM 0.25 MG: 0.5 TABLET ORAL at 21:08

## 2025-01-15 RX ADMIN — DILTIAZEM HYDROCHLORIDE 180 MG: 180 CAPSULE, EXTENDED RELEASE ORAL at 08:39

## 2025-01-15 RX ADMIN — METOPROLOL TARTRATE 25 MG: 25 TABLET, FILM COATED ORAL at 21:08

## 2025-01-15 RX ADMIN — MINOCYCLINE HYDROCHLORIDE 100 MG: 50 CAPSULE ORAL at 21:08

## 2025-01-15 RX ADMIN — ACETAMINOPHEN 650 MG: 325 TABLET ORAL at 17:47

## 2025-01-15 RX ADMIN — MINOCYCLINE HYDROCHLORIDE 100 MG: 50 CAPSULE ORAL at 08:38

## 2025-01-15 RX ADMIN — SODIUM CHLORIDE 2000 MG: 900 INJECTION INTRAVENOUS at 09:05

## 2025-01-15 RX ADMIN — LORAZEPAM 0.25 MG: 0.5 TABLET ORAL at 09:05

## 2025-01-15 RX ADMIN — HYDROCODONE POLISTIREX AND CHLORPHENIRAMINE POLISTIREX 2.5 ML: 10; 8 SUSPENSION, EXTENDED RELEASE ORAL at 13:30

## 2025-01-15 RX ADMIN — PREDNISONE 20 MG: 20 TABLET ORAL at 08:39

## 2025-01-15 NOTE — PLAN OF CARE
Goal Outcome Evaluation:  Plan of Care Reviewed With: patient        Progress: improving  Outcome Evaluation: patient ambulated 12' + 18' + 10' with min assist x1, mild unsteadiness with 3 episodes of LOB needing min assist to correct, decreased safety awareness with mobility and transfers, needs encouragement to progress mobility and to increase time sitting up in recliner. Recommend IRF at D/C for best functional outcome.

## 2025-01-15 NOTE — PROGRESS NOTES
[facility] Medicine Services  PROGRESS NOTE    Patient Name: Kristen Jean  : 1960  MRN: [id number]    Date of Admission: 2025  Primary Care Physician: Moiz Nova MD    Subjective   Subjective     CC:  SOA    HPI:  Pt states that she still doesn't feel ready to go today. Wants to wait another day just to make sure.  CM reports that patient is now interested in rehab.     Objective   Objective     Vital Signs:   Temp:  [98.2 °F (36.8 °C)-99.2 °F (37.3 °C)] 99.2 °F (37.3 °C)  Heart Rate:  [] 83  Resp:  [16-20] 18  BP: (116-127)/(44-61) 119/55  Flow (L/min) (Oxygen Therapy):  [4-5] 5     Physical Exam:  Constitutional: No acute distress, awake, alert, cachetic appearing WF in NAD  HENT: NCAT, mucous membranes moist  Respiratory: diffuse rhonchi bilaterally   Cardiovascular: RRR,  no murmurs, rubs, or gallops  Gastrointestinal: Positive bowel sounds, soft, nontender, nondistended  Musculoskeletal: No bilateral ankle edema  Psychiatric: Appropriate affect, cooperative  Neurologic: Oriented x 3, strength symmetric in all extremities, Cranial Nerves grossly intact to confrontation, speech clear  Skin: No rashes      Results Reviewed:  LAB RESULTS:      Lab 01/15/25  0417 25  0427 25  0344 25  0554 25  2135 25  1859 25  1842 25  0408 25  1807 25  1622   WBC 21.94* 17.07* 12.92* 11.64*  --  15.32*  --  13.89*  --  16.67*   HEMOGLOBIN 10.1* 9.8* 9.1* 9.4*  --  10.4*  --  9.6*  --  10.1*   HEMATOCRIT 31.4* 30.6* 28.1* 28.8*  --  32.1*  --  29.4*  --  30.4*   PLATELETS 866* 816* 727* 648*  --  687*  --  509*  --  493*   NEUTROS ABS 16.61* 12.23* 8.84* 7.69*  --   --   --  9.68*  --  12.88*   IMMATURE GRANS (ABS) 0.37* 0.30* 0.21* 0.17*  --   --   --  0.15*  --  0.09*   LYMPHS ABS 2.49 2.28 1.95 1.76  --   --   --  1.60  --  1.74   MONOS ABS 1.31* 1.32* 1.18* 1.16*  --   --   --  1.41*  --  1.50*   EOS ABS 1.07* 0.88*  0.69* 0.80*  --   --   --  0.99*  --  0.42*   MCV 82.6 82.5 81.9 81.8  --  82.5  --  81.2  --  79.8   SED RATE  --   --   --   --   --   --   --   --   --  113*   CRP  --   --   --   --   --   --   --   --  13.13*  --    PROCALCITONIN  --   --   --   --   --   --   --   --  0.09  --    HSTROP T  --   --   --   --  14*  --  12  -- 12 11         Lab 01/15/25  0417 01/14/25  0427 01/13/25  0344 01/12/25  0555 01/11/25  1842 01/11/25  0408 01/09/25  1807 01/09/25  1622   SODIUM 131* 130* 131* 134* 132* 129*  --  127*   POTASSIUM 4.2 4.1 4.0 4.2 4.6  4.6 3.1*  --  3.4*   CHLORIDE 93* 93* 95* 98 97* 93*  --  90*   CO2 33.0* 31.0* 30.0* 29.0 25.0 27.0  --  27.0   ANION GAP 5.0 6.0 6.0 7.0 10.0 9.0  --  10.0   BUN 5* 4* 4* 3* 3* 3*  --  5*   CREATININE 0.26* 0.22* 0.26* 0.19* 0.26* 0.24*  --  0.31*   EGFR 123.6 128.6 123.6 133.3 123.6 126.0  --  118.4   GLUCOSE 105* 93 98 99 115* 116*  --  131*   CALCIUM 9.2 9.1 8.5* 8.8 8.8 8.6  --  9.1   MAGNESIUM  --  2.1 2.0 2.2 1.8 1.8  --   --    PHOSPHORUS  --   --   --  3.6 1.8*  --   --   --    HEMOGLOBIN A1C  --   --   --   --   --   --   --  6.00*   TSH  --   --   --   --   --   --  0.160*  --          Lab 01/11/25  1842 01/09/25  1622   TOTAL PROTEIN 5.9* 6.1   ALBUMIN 3.0* 3.2*   GLOBULIN 2.9 2.9   ALT (SGPT) 51* 21   AST (SGOT) 101* 29   BILIRUBIN <0.2 0.3   ALK PHOS 434* 201*   LIPASE  --  18         Lab 01/11/25  2135 01/11/25  1842 01/09/25  1807 01/09/25  1622   PROBNP  --   --   --  264.7   HSTROP T 14* 12 12 11         Lab 01/09/25  1807   CHOLESTEROL 144   LDL CHOL 88   HDL CHOL 41   TRIGLYCERIDES 74             Brief Urine Lab Results  (Last result in the past 365 days)        Color   Clarity   Blood   Leuk Est   Nitrite   Protein   CREAT   Urine HCG        01/10/25 0730 Yellow   Clear   Negative   Negative   Negative   Negative                   Microbiology Results Abnormal       None            No radiology results from the last 24 hrs    Results for orders placed  during the hospital encounter of 01/09/25    Adult Transthoracic Echo Complete W/ Cont if Necessary Per Protocol    Interpretation Summary    Left ventricular systolic function is hyperdynamic (EF > 70%).    No hemodynamically significant valvular disease present.    The right ventricular cavity is mildly dilated.    Estimated right ventricular systolic pressure from tricuspid regurgitation is normal (<35 mmHg).    There is a left pleural effusion.      Current medications:  Scheduled Meds:Pharmacy Consult, , Not Applicable, BID  apixaban, 5 mg, Oral, Q12H  cefTRIAXone, 2,000 mg, Intravenous, Q24H  dilTIAZem CD, 180 mg, Oral, Q24H  Fluticasone Furoate-Vilanterol, 1 puff, Inhalation, Daily - RT  ipratropium-albuterol, 3 mL, Nebulization, 4x Daily - RT  lactated ringers, 500 mL, Intravenous, Once  melatonin, 5 mg, Oral, Nightly  metoprolol tartrate, 25 mg, Oral, Q12H  minocycline, 100 mg, Oral, Q12H  pantoprazole, 40 mg, Oral, Nightly  Pharmacy Meds to Bed Consult, , Not Applicable, Daily  predniSONE, 20 mg, Oral, Daily With Breakfast  sodium chloride, 10 mL, Intravenous, Q12H      Continuous Infusions:Pharmacy Consult,       PRN Meds:.  acetaminophen **OR** acetaminophen **OR** acetaminophen    Albuterol Sulfate NEB Orderable    benzonatate    senna-docusate sodium **AND** polyethylene glycol **AND** bisacodyl **AND** bisacodyl    Calcium Replacement - Follow Nurse / BPA Driven Protocol    Halls Cough Drops    Hydrocod Varun-Chlorphe Varun ER    LORazepam    Magnesium Standard Dose Replacement - Follow Nurse / BPA Driven Protocol    nitroglycerin    Pharmacy Consult    Phosphorus Replacement - Follow Nurse / BPA Driven Protocol    Potassium Replacement - Follow Nurse / BPA Driven Protocol    sodium chloride    sodium chloride    sodium chloride    Assessment & Plan   Assessment & Plan     Active Hospital Problems    Diagnosis  POA    **Multifocal pneumonia [J18.9]  Yes    Atrial tachycardia [I47.19]  No    Severe  protein-calorie malnutrition [E43]  Yes    Atrial fibrillation/flutter [I48.91]  Yes    Heart failure with improved ejection fraction (HFimpEF) [I50.32]  Yes    Aortic atherosclerosis [I70.0]  Yes    Emphysema of lung [J43.9]  Yes    Asthma-COPD overlap syndrome [J44.89]  Yes    Anxiety disorder [F41.9]  Yes    Hyponatremia [E87.1]  Yes    Hypokalemia [E87.6]  Yes    Protein calorie malnutrition [E46]  Yes    Cachexia [R64]  Yes    End stage COPD [J44.9]  Yes    Hypothyroidism (acquired) [E03.9]  Yes    Mycobacterium avium complex [A31.0]  Yes    Chronic bronchitis [J42]  Yes    Mycobacterium infection [A31.9]  Yes      Resolved Hospital Problems    Diagnosis Date Resolved POA    Pneumonia [J18.9] 01/11/2025 Yes    T2DM (type 2 diabetes mellitus) [E11.9] 01/11/2025 Yes        Brief Hospital Course to date:  Kristen Jean is a 64 y.o. female  with a PMH significant for pulmonary Mycobacterium avium complex diagnosed 2015, end-stage COPD, COPD/asthma overlap syndrome, advanced emphysema, anxiety and protein calorie malnutrition with COPD cachexia who presented to [facility] ED secondary to worsening shortness of breath, higher oxygen requirements, right-sided pleuritic chest pain, pain in lower chest on the right side and was found to have multifocal PNA.    This patient's problems and plans were partially entered by my partner and updated as appropriate by me 01/15/25.    Plan:     Pneumonia  Multifocal pneumonia  Mycobacterium avium complex  End-stage COPD  Asthma - COPD overlap syndrome  Emphysema of the lung  Chronic bronchitis  Patient has a history that is significant for advanced COPD with a history of asthma - COPD overlap syndrome, follows [facility] pulmonology  - Does have a history of Mycobacterium AVM complex, appears on CT that her previous RLL nodule has decreased in size  - CT chest without contrast revealed multifocal pneumonia more pronounced RML/RLL  - MRSA swab, streptococcal  Legionella urine antigens negative, respiratory culture PENDING   -- respiratory viral PCR panel negative  - Consulted ID,  will require guidance on antimicrobial regimen and duration. Appreciate Dr. Wasserman's assistance. Continue Rocephin/Minocycline per his recs. Plan for solely Minocycline upon d/c   - Consulted pulmonology due to extent of pulmonary disease  - DuoNebs, Tessalon Perles and albuterol inhaler  - Continue nasal cannula oxygen and wean as tolerated     Hyponatremia  Hypokalemia  Suspect SIADH in the setting of pneumonia versus hypovolemic hyponatremia  - s/p IVFs  -- Na+ stable  - Potassium replacement protocol in place    New Onset Afib with RVR  ?SVT  -- started dilt gtt, continue as BP allows  -- consult Cardiology given new diagnosis, appreciate their assistance  -- started on Eliquis, [id number] 2  -- given IV digoxin but remained tachycardic   -- EKG repeated and concern for SVT   -- pt refused IV Adenosine initially, however, eventually agreed. Briefly HR was better, but then went back to 160s.  Resumed dilt gtt at that point.   -- now off dilt gtt and in NSR  -- continue with Metoprolol and PO Diltiazem as per Cardiology  -- TTE with normal EF      Protein calorie malnutrition  Cachexia of end-stage lung disease  Patient has low albumin, appears cachectic likely secondary to advanced lung disease  - Nutrition consult for assessment of malnutrition     Goals of care  -- pt still full code for time being.  Will continue to discuss with her given her poor overall prognosis      Low TSH  -- Likely subclinical thyroiditis with low TSH and normal free T4, in the setting of pneumonia  -- Will need repeat of thyroid function in 6 to 8 weeks.     T2DM/prediabetes   HbA1c 6%   LDL 88    Anxiety  --  reordered her benzo, she requested 0.25 mg QID (vs 0.5 mg BID at home).      Total time spent: Time Spent: Time Spent: 25 minutes  Time spent includes time reviewing chart, face-to-face time, counseling  patient/family/caregiver, ordering medications/tests/procedures, communicating with other health care professionals, documenting clinical information in the electronic health record, and coordination of care.      Expected Discharge Location and Transportation: home tomorrow vs ? rehab  Expected Discharge   Expected Discharge Date: 1/16/2025; Expected Discharge Time:      VTE Prophylaxis:  Pharmacologic & mechanical VTE prophylaxis orders are present.         AM-PAC 6 Clicks Score (PT): 20 (01/15/25 0800)    CODE STATUS:   Code Status and Medical Interventions: CPR (Attempt to Resuscitate); Full Support   Ordered at: 01/09/25 2009     Level Of Support Discussed With:    Patient     Code Status (Patient has no pulse and is not breathing):    CPR (Attempt to Resuscitate)     Medical Interventions (Patient has pulse or is breathing):    Full Support       Franchesca Stewart MD  01/15/25

## 2025-01-15 NOTE — PLAN OF CARE
Problem: Adult Inpatient Plan of Care  Goal: Plan of Care Review  Outcome: Progressing  Flowsheets  Taken 1/15/2025 0529 by Soco Avery, RN  Progress: improving  Taken 1/14/2025 1833 by Roberto Apodaca, RN  Plan of Care Reviewed With: patient   Goal Outcome Evaluation:           Progress: improving

## 2025-01-15 NOTE — CASE MANAGEMENT/SOCIAL WORK
Continued Stay Note  [facility]     Patient Name: Kristen Jean  MRN: [id number]  Today's Date: 1/15/2025    Admit Date: 1/9/2025    Plan: home versus home with home health   Discharge Plan       Row Name 01/15/25 5403       Plan    Plan home versus home with home health    Patient/Family in Agreement with Plan yes    Plan Comments Met with Ms. Jean at the bedside to discuss discharge plan. She asked about getting a home health referral for nursing and PT.  left [facility]ic[facility] with [facility] liaisonS[facility].  will continue to follow plan of care and assist with discharge planning needs as indicated.    Final Discharge Disposition Code 01 - home or self-care                   Discharge Codes    No documentation.                 Expected Discharge Date and Time       Expected Discharge Date Expected Discharge Time    Jan 16, 2025               Blanca Steinberg RN

## 2025-01-15 NOTE — PLAN OF CARE
Goal Outcome Evaluation:  Plan of Care Reviewed With: patient           Outcome Evaluation: Pt SBA to don slippers, CGA BSC transfer,  min A to ambulate without AD with 3 LOB.   Pt continues below baseline d/t weakness, decr balance and activity toelrance.  Recommend IP rehab for best outcome.    Anticipated Discharge Disposition (OT): inpatient rehabilitation facility

## 2025-01-15 NOTE — THERAPY TREATMENT NOTE
Patient Name: Kristen Jean  : 1960    MRN: [id number]                              Today's Date: 1/15/2025       Admit Date: 2025    Visit Dx:     ICD-10-CM ICD-9-CM   1. Multifocal pneumonia  J18.9 486   2. History of MAC infection  Z86.19 V12.09   3. Bandemia  D72.825 288.66   4. Hyponatremia  E87.1 276.1   5. Atrial tachycardia  I47.19 427.89   6. Aortic atherosclerosis  I70.0 440.0   7. Heart failure with improved ejection fraction (HFimpEF)  I50.32 428.32   8. Paroxysmal atrial fibrillation  I48.0 427.31   9. Severe protein-calorie malnutrition  E43 262   10. Mycobacterium avium complex  A31.0 031.2   11. Hypothyroidism (acquired)  E03.9 244.9   12. End stage COPD  J44.9 496   13. Cachexia  R64 799.4   14. Asthma-COPD overlap syndrome  J44.89 493.20   15. Centrilobular emphysema  J43.2 492.8   16. Pneumonia of right upper lobe due to infectious organism  J18.9 486   17. Mycobacterium infection  A31.9 031.9   18. Mucopurulent chronic bronchitis  J41.1 491.1     Patient Active Problem List   Diagnosis    Chronic bronchitis    Uncomplicated asthma    Mycobacterium infection    Emphysema of lung    Asthma-COPD overlap syndrome    Anxiety disorder    Multifocal pneumonia    Hyponatremia    Hypokalemia    Protein calorie malnutrition    Cachexia    End stage COPD    Hypothyroidism (acquired)    Mycobacterium avium complex    Severe protein-calorie malnutrition    Atrial fibrillation/flutter    Heart failure with improved ejection fraction (HFimpEF)    Aortic atherosclerosis    Atrial tachycardia     Past Medical History:   Diagnosis Date    Anxiety disorder 2025    Asthma-COPD overlap syndrome 2025    Breast injury     Cachexia 2025    Chronic bronchitis 2016    Emphysema of lung 2025    Hypothyroidism (acquired) 2025    Mycobacterium infection 2016    T2DM (type 2 diabetes mellitus) 2025     Past Surgical History:   Procedure Laterality Date    TOOTH  EXTRACTION      1990 and 2002      General Information       Row Name 01/15/25 1428          Physical Therapy Time and Intention    Document Type therapy note (daily note)  -AS     Mode of Treatment physical therapy  -AS       Row Name 01/15/25 1428          General Information    Patient Profile Reviewed yes  -AS     Existing Precautions/Restrictions orthostatic hypotension;oxygen therapy device and L/min;fall  -AS     Barriers to Rehab medically complex;previous functional deficit;ineffective coping  -AS       Row Name 01/15/25 1428          Cognition    Orientation Status (Cognition) oriented to;person  -AS       Row Name 01/15/25 1428          Safety Issues/Impairments Affecting Functional Mobility    Safety Issues Affecting Function (Mobility) awareness of need for assistance;insight into deficits/self-awareness;safety precaution awareness;safety precautions follow-through/compliance;sequencing abilities  -AS     Impairments Affecting Function (Mobility) endurance/activity tolerance;shortness of breath;pain;strength;balance  -AS     Comment, Safety Issues/Impairments (Mobility) needs encouragement to participate in therapy and sitting up in recliner  -AS               User Key  (r) = Recorded By, (t) = Taken By, (c) = Cosigned By      Initials Name Provider Type    AS Joyce Gotti PTA Physical Therapist Assistant                   Mobility       [facility] Name 01/15/25 1429          Bed Mobility    Supine-Sit [place] (Bed Mobility) standby assist  -AS     Assistive Device (Bed Mobility) head of bed elevated  -AS     Comment, (Bed Mobility) no physical assist needed, line maanagement  -AS       Row Name 01/15/25 1429          Transfers    Comment, (Transfers) verbal cues for hand placement, mild unsteadiness  -AS       Row Name 01/15/25 1429          Bed-Chair Transfer    Bed-Chair [place] (Transfers) verbal cues;minimum assist (75% patient effort);1 person assist  -AS     Assistive Device (Bed-Chair  Transfers) other (see comments)  -AS     Comment, (Bed-Chair Transfer) no AD  -AS       Row Name 01/15/25 1429          Sit-Stand Transfer    Sit-Stand [place] (Transfers) minimum assist (75% patient effort);1 person assist  -AS     Assistive Device (Sit-Stand Transfers) other (see comments)  -AS     Comment, (Sit-Stand Transfer) cues forf hand placement, mild unsteadiness with LOB on intital stand  -AS       Row Name 01/15/25 1429          Gait/Stairs (Locomotion)    [place] Level (Gait) verbal cues;minimum assist (75% patient effort);1 person assist  -AS     Assistive Device (Gait) other (see comments)  no AD  -AS     Distance in Feet (Gait) 12  + 18  -AS     Deviations/Abnormal Patterns (Gait) bilateral deviations;betty decreased;gait speed decreased;stride length decreased  -AS     Bilateral Gait Deviations heel strike decreased;weight shift ability decreased  -AS     Comment, (Gait/Stairs) patient ambulated 12' + 18' with min assist x1, mild unsteadiness with 3 episode of LOB needing min assist to correct, decreased safety awareness with mobility and transfers, needs encouragement to progress mobility and to increase time in sitting up in recliner.  -AS               User Key  (r) = Recorded By, (t) = Taken By, (c) = Cosigned By      Initials Name Provider Type    AS Joyce Gotti PTA Physical Therapist Assistant                   Obj/Interventions       Row Name 01/15/25 1432          Balance    Dynamic Standing Balance verbal cues;minimal assist;1-person assist  -AS     Position/Device Used, Standing Balance other (see comments)  -AS     Comment, Balance 3 episodes of LOB needing assist to correct  -AS               User Key  (r) = Recorded By, (t) = Taken By, (c) = Cosigned By      Initials Name Provider Type    AS Joyce Gotti PTA Physical Therapist Assistant                   Goals/Plan    No documentation.                  Clinical Impression       Row Name 01/15/25 1433           Pain Scale: FACES Pre/Post-Treatment    Pain: FACES Scale, Pretreatment 0-->no hurt  -AS     Posttreatment Pain Rating 0-->no hurt  -AS       Row Name 01/15/25 1433          Plan of Care Review    Plan of Care Reviewed With patient  -AS     Progress improving  -AS     Outcome Evaluation patient ambulated 12' + 18' + 10' with min assist x1, mild unsteadiness with 3 episodes of LOB needing min assist to correct, decreased safety awareness with mobility and transfers, needs encouragement to progress mobility and to increase time sitting up in recliner. Recommend IRF at D/C for best functional outcome.  -AS       Row Name 01/15/25 1433          Vital Signs    Intra SpO2 (%) 85  -AS     O2 Delivery Intra Treatment supplemental O2  -AS     Post SpO2 (%) 93  -AS     O2 Delivery Post Treatment supplemental O2  -AS     Intra Patient Position Sitting  -AS     Post Patient Position Sitting  -AS       Row Name 01/15/25 1433          Positioning and Restraints    Pre-Treatment Position in bed  -AS     Post Treatment Position chair  -AS     In Chair reclined;call light within reach;encouraged to call for assist;exit alarm on;waffle cushion;legs elevated  -AS               User Key  (r) = Recorded By, (t) = Taken By, (c) = Cosigned By      Initials Name Provider Type    AS Joyce Gotti PTA Physical Therapist Assistant                   Outcome Measures       Row Name 01/15/25 1434 01/15/25 0800       How much help from another person do you currently need...    Turning from your back to your side while in flat bed without using bedrails? 4  -AS 4  -SW    Moving from lying on back to sitting on the side of a flat bed without bedrails? 4  -AS 4  -SW    Moving to and from a bed to a chair (including a wheelchair)? 3  -AS 3  -SW    Standing up from a chair using your arms (e.g., wheelchair, bedside chair)? 3  -AS 3  -SW    Climbing 3-5 steps with a railing? 2  -AS 3  -SW    To walk in hospital room? 3  -AS 3  -SW    AM-PAC  6 Clicks Score (PT) 19  -AS 20  -SW    Highest Level of Mobility Goal 6 --> Walk 10 steps or more  -AS 6 --> Walk 10 steps or more  -SW      Row Name 01/15/25 1434          Functional Assessment    Outcome Measure Options AM-PAC 6 Clicks Basic Mobility (PT)  -AS               User Key  (r) = Recorded By, (t) = Taken By, (c) = Cosigned By      Initials Name Provider Type    AS Joyce Gotti PTA Physical Therapist Assistant    Gosia Villarreal RN Registered Nurse                                 Physical Therapy Education       Title: PT OT SLP Therapies (In Progress)       Topic: Physical Therapy (In Progress)       Point: Mobility training (In Progress)       Learning Progress Summary            Patient Acceptance, E, NR by AS at 1/15/2025 1435    Acceptance, E, VU by AC at 1/13/2025 1547    Acceptance, E, VU,NR by LO at 1/10/2025 0945    Comment: PT POC                      Point: Home exercise program (In Progress)       Learning Progress Summary            Patient Acceptance, E, NR by AS at 1/15/2025 1435    Acceptance, E, VU by AC at 1/13/2025 1547    Acceptance, E, VU,NR by LO at 1/10/2025 0945    Comment: PT POC                      Point: Body mechanics (In Progress)       Learning Progress Summary            Patient Acceptance, E, NR by AS at 1/15/2025 1435    Acceptance, E, VU by AC at 1/13/2025 1547    Acceptance, E, VU,NR by LO at 1/10/2025 0945    Comment: PT POC                      Point: Precautions (In Progress)       Learning Progress Summary            Patient Acceptance, E, NR by AS at 1/15/2025 1435    Acceptance, E, VU by AC at 1/13/2025 1547    Acceptance, E, VU,NR by LO at 1/10/2025 0945    Comment: PT POC                                      User Key       Initials Effective Dates Name Provider Type Discipline    AS 12/13/24 -  Joyce Gotti, SUBHASH Physical Therapist Assistant PT    LO 06/16/21 -  Elina Zaidi, PT Physical Therapist PT    AC 07/11/24 -  Gricelda Diaz, PT Physical  Therapist PT                  PT Recommendation and Plan     Progress: improving  Outcome Evaluation: patient ambulated 12' + 18' + 10' with min assist x1, mild unsteadiness with 3 episodes of LOB needing min assist to correct, decreased safety awareness with mobility and transfers, needs encouragement to progress mobility and to increase time sitting up in recliner. Recommend IRF at D/C for best functional outcome.     Time Calculation:         PT Charges       Row Name 01/15/25 1435             Time Calculation    Start Time 1403  -AS      PT Received On 01/15/25  -AS      PT Goal Re-Cert Due Date 01/20/25  -AS         Timed Charges    71908 - Gait Training Minutes  15  -AS      42494 - PT Therapeutic Activity Minutes 8  -AS         Total Minutes    Timed Charges Total Minutes 23  -AS       Total Minutes 23  -AS                User Key  (r) = Recorded By, (t) = Taken By, (c) = Cosigned By      Initials Name Provider Type    AS Joyce Gotti PTA Physical Therapist Assistant                  Therapy Charges for Today       Code Description Service Date Service Provider Modifiers Qty    36846031567 HC GAIT TRAINING EA 15 MIN 1/15/2025 Joyce Gotti PTA GP 1    64113329651 HC PT THERAPEUTIC ACT EA 15 MIN 1/15/2025 Joyce Gotti PTA GP 1            PT G-Codes  Outcome Measure Options: AM-PAC 6 Clicks Basic Mobility (PT)  AM-PAC 6 Clicks Score (PT): 19  AM-PAC 6 Clicks Score (OT): 20       Joyce Gotti PTA  1/15/2025

## 2025-01-15 NOTE — PROGRESS NOTES
INFECTIOUS DISEASE Progress Note    Kristen Jean  1960  [id number]    Admission Date: 1/9/2025      Requesting Provider: Rosi Medina MD  Evaluating Physician: Az Wasserman MD    Reason for Consultation: Multifocal pneumonia with h/o MAC on chronic therapy    History of present illness:     1/10/2025:Patient is a 64 y.o. female with pulmonary Mycobacterium avium infection 2015/on chronic therapy, ES COPD/3L O2 baseline, and protein calorie malnutrition who presented to [facility] ED on 1/9/25 with worsening shortness of breath with right pleuritic chest pain, and higher oxygen requirements over the last week prior to admission. About 2 days ago, the patient bent over and she felt like she tore something in her right lower chest.  She quit smoking 11/2024, but all her neighbors smoke in the apartment complex.  The past week, she has needed 5-6 L/min O2 and is still satting at 93% on this oxygen setting.  She follow pulmonology and ID at [facility].  She has been noncompliant with her antibiotics for BONI and rotating taking Levaquin, Azithromycin and Rifampin because of complaints of side effects.   She denies fever, chills, nausea, vomiting, diarrhea, or dysuria.  She has remained afebrile with initial tachycardia. Initial labs were , PCT 0.09, WBC 16,700 with 77% neutrophils, Na 127, K 3.4, creatinine 0.31, CRP 13.13, and CPK 52.  A respiratory panel PCR was negative.  Urinary antigens for Strep pneumo and Legionella were negative. Nasal MRSA PCR is negative. A CT scan of chest without contrast showed right lung multifocal pneumonia, decrease in size of RLL pulmonary nodule.  She is currently on Merrem and Zyvox.  ID was asked to evaluate and manage her antibiotic therapy.    She has been noncompliant with follow-up.  Her last ID visit at  was in 5/24.  She has been noncompliant with her azithromycin, rifampin, and Levaquin as she takes these medications intermittently.  She states that she  has not received an RSV vaccine, Prevnar 20 pneumococcal vaccine, influenza vaccine, or COVID-19 vaccine booster     1/11/2025:  She has remained afebrile.  White blood cell count is 13.9.   MRSA nasal PCR was negative.  Urinary Legionella and pneumococcal antigens were negative.  O2 saturation is 90% on 3 L.    He complains of pleuritic right chest pain.  She complains of dyspnea.  She has minimal sputum production.   she refused taking the doxycycline due to prior nausea on doxycycline.  She is agreeable to trying minocycline.  She will need to take the minocycline with food but not any calcium containing food within 1 to 2 hours of the capsules.  I discussed this issue with her today in detail     1/12/2025: She remains afebrile. White blood cell count is 11.6.   She feels better today.  She has decreased right pleuritic chest pain.  She denies increased cough and sputum production.  She denies nausea vomiting. She denies increased dyspnea.  She is tolerating the minocycline with no nausea.     1/13/2025:   She remains afebrile.  White blood cell count is 12.9.  Creatinine is 0.26.  Sodium is 131.  She is on 3 L of oxygen with an O2 saturation of 94%.   She continues to feel better.  Her right pleuritic chest pain is resolved.  She denies increased dyspnea and cough.  She denies hemoptysis    1/14/2025:  She remains afebrile.  Creatinine is 0.22.  White blood cell count is 17.1.  She is on prednisone at 20 mg/day. .  O2 saturation is 95% on 4 L.   She complains of some residual right pleuritic chest pain.  She has a cough with minimal sputum production.    1/15/24:  She remains afebrile.   White blood cell count is 21.9. and Rocephin dose is currently scheduled for 1800.  She is currently on 5 L of oxygen with an O2 saturation of 93%.   She is on 20 mg of prednisone per day.  She feels partially improved.  She does not feel ready to go home today and wants to wait until tomorrow.       Past Medical History:    Diagnosis Date    Anxiety disorder 01/09/2025    Asthma-COPD overlap syndrome 01/09/2025    Breast injury     Cachexia 01/09/2025    Chronic bronchitis 08/11/2016    Emphysema of lung 01/09/2025    Hypothyroidism (acquired) 01/09/2025    Mycobacterium infection 08/11/2016    T2DM (type 2 diabetes mellitus) 01/09/2025       Past Surgical History:   Procedure Laterality Date    TOOTH EXTRACTION      1990 and 2002       Family History   Problem Relation Age of Onset    Dementia Father     Breast cancer Neg Hx     Ovarian cancer Neg Hx        Social History     Socioeconomic History    Marital status: Single   Tobacco Use    Smoking status: Every Day   Vaping Use    Vaping status: Never Used   Substance and Sexual Activity    Alcohol use: No    Drug use: Never    Sexual activity: Defer       Allergies   Allergen Reactions    Codeine     Fluticasone-Salmeterol     Penicillins          Medication:    Current Facility-Administered Medications:     !!! Please obtain patients home medications that are stored in central pharmacy (including Breo inhaler in [facility]) and return to patient prior to discharge!, , Not Applicable, BID, Nicole Ch, PharmD, Given at 01/10/25 2337    acetaminophen (TYLENOL) tablet 650 mg, 650 mg, Oral, Q4H PRN **OR** acetaminophen (TYLENOL) 160 MG/5ML oral solution 650 mg, 650 mg, Oral, Q4H PRN **OR** acetaminophen (TYLENOL) suppository 650 mg, 650 mg, Rectal, Q4H PRN, Rosi Medina MD    albuterol (PROVENTIL) nebulizer solution 0.083% 2.5 mg/3mL, 2.5 mg, Nebulization, Q4H PRN, Luis Lanier MD, 2.5 mg at 01/15/25 0145    apixaban (ELIQUIS) tablet 5 mg, 5 mg, Oral, Q12H, Wayne Salcedo IV, MD, 5 mg at 01/14/25 2031    benzonatate (TESSALON) capsule 100 mg, 100 mg, Oral, TID PRN, Rosi Medina MD, 100 mg at 01/14/25 2030    sennosides-docusate (PERICOLACE) 8.6-50 MG per tablet 2 tablet, 2 tablet, Oral, BID PRN **AND** polyethylene glycol (MIRALAX) packet 17 g, 17 g,  Oral, Daily PRN **AND** bisacodyl (DULCOLAX) EC tablet 5 mg, 5 mg, Oral, Daily PRN **AND** bisacodyl (DULCOLAX) suppository 10 mg, 10 mg, Rectal, Daily PRN, Rosi Medina MD    Calcium Replacement - Follow Nurse / BPA Driven Protocol, , Not Applicable, PRN, Rosi Medina MD    cefTRIAXone (ROCEPHIN) 2,000 mg in sodium chloride 0.9 % 100 mL MBP, 2,000 mg, Intravenous, Q24H, Anthony Hodges PA, Last Rate: 200 mL/hr at 01/14/25 1758, 2,000 mg at 01/14/25 1758    dilTIAZem CD (CARDIZEM CD) 24 hr capsule 180 mg, 180 mg, Oral, Q24H, Sherry Darden, APRN, 180 mg at 01/14/25 0834    Fluticasone Furoate-Vilanterol (BREO ELLIPTA) 200-25 MCG/ACT inhaler 1 puff **Patient Supplied Med**, 1 puff, Inhalation, Daily - RT, Dorian Sinha, [facility], 1 puff at 01/14/25 1954    Halls Cough Drops (lozenges), 1 lozenge, Buccal, Q2H PRN, Marlen Blevins PA-C, 1 lozenge at 01/11/25 1754    Hydrocod Varun-Chlorphe Varun ER (TUSSIONEX PENNKINETIC) 10-8 MG/5ML ER suspension 2.5 mL, 2.5 mL, Oral, Q12H PRN, Marlen Blevins PA-C, 2.5 mL at 01/14/25 1138    ipratropium-albuterol (DUO-NEB) nebulizer solution 3 mL, 3 mL, Nebulization, 4x Daily - RT, Rosi Medina MD, 3 mL at 01/15/25 0744    lactated ringers bolus 500 mL, 500 mL, Intravenous, Once, Rosi Medina MD    LORazepam (ATIVAN) tablet 0.25 mg, 0.25 mg, Oral, Q6H PRN, Franchesca Stewart MD, 0.25 mg at 01/14/25 2030    Magnesium Standard Dose Replacement - Follow Nurse / BPA Driven Protocol, , Not Applicable, PRN, Rosi Medina MD    melatonin tablet 5 mg, 5 mg, Oral, Nightly, Rosi Medina MD    metoprolol tartrate (LOPRESSOR) tablet 25 mg, 25 mg, Oral, Q12H, Wayne Salcedo IV, MD, 25 mg at 01/13/25 2123    minocycline (MINOCIN,DYNACIN) capsule 100 mg, 100 mg, Oral, Q12H, Az Wasserman MD, 100 mg at 01/14/25 2030    nitroglycerin (NITROSTAT) SL tablet 0.4 mg, 0.4 mg, Sublingual, Q5 Min PRN, Rosi Medina MD    pantoprazole (PROTONIX)  EC tablet 40 mg, 40 mg, Oral, Nightly, Rosi Medina MD, 40 mg at 01/14/25 2030    Pharmacy Consult, , Not Applicable, Continuous PRN, Luis Lanier MD    Pharmacy Meds to Bed Consult, , Not Applicable, Daily, Radha Carballo APRN    Phosphorus Replacement - Follow Nurse / BPA Driven Protocol, , Not Applicable, PRN, Rosi Medina MD    Potassium Replacement - Follow Nurse / BPA Driven Protocol, , Not Applicable, PRN, Rosi Medina MD    predniSONE (DELTASONE) tablet 20 mg, 20 mg, Oral, Daily With Breakfast, Luis Lanier MD, 20 mg at 01/14/25 0834    sodium chloride 0.9 % flush 10 mL, 10 mL, Intravenous, PRN, Rosi Medina MD    sodium chloride 0.9 % flush 10 mL, 10 mL, Intravenous, Q12H, Rosi Medina MD, 10 mL at 01/14/25 2031    sodium chloride 0.9 % flush 10 mL, 10 mL, Intravenous, PRN, Rosi Medina MD    sodium chloride 0.9 % infusion 40 mL, 40 mL, Intravenous, PRN, Rosi Medina MD    Antibiotics:  Anti-Infectives (From admission, onward)      Ordered     Dose/Rate Route Frequency Start Stop    01/11/25 1011  minocycline (MINOCIN,DYNACIN) capsule 100 mg        Ordering Provider: Az Wasserman MD    100 mg Oral Every 12 Hours Scheduled 01/11/25 1100 01/21/25 0859    01/10/25 1534  doxycycline (MONODOX) capsule 100 mg  Status:  Discontinued        Ordering Provider: Anthony Hodges PA    100 mg Oral Every 12 Hours Scheduled 01/10/25 2100 01/11/25 1010    01/10/25 1534  cefTRIAXone (ROCEPHIN) 2,000 mg in sodium chloride 0.9 % 100 mL MBP        Ordering Provider: Anthony Hodges PA    2,000 mg  200 mL/hr over 30 Minutes Intravenous Every 24 Hours 01/10/25 1800 01/24/25 1759    01/09/25 2014  meropenem (MERREM) 1,000 mg in sodium chloride 0.9 % 100 mL MBP  Status:  Discontinued        Ordering Provider: Rosi Medina MD    1,000 mg  over 3 Hours Intravenous Every 8 Hours 01/10/25 0600 01/10/25 1534    01/09/25 2014  Linezolid (ZYVOX) 600 mg 300 mL  Status:   Discontinued        Ordering Provider: Rosi Medina MD    600 mg  300 mL/hr over 60 Minutes Intravenous Every 12 Hours 01/10/25 0500 25 0846    25  meropenem (MERREM) 1,000 mg in sodium chloride 0.9 % 100 mL MBP        Ordering Provider: Rosi Medina MD    1,000 mg  over 30 Minutes Intravenous Once 01/10/25 0000 01/10/25 0153    25 1717  Linezolid (ZYVOX) 600 mg 300 mL        Ordering Provider: Ezequiel Eaton DO    600 mg  300 mL/hr over 60 Minutes Intravenous Once 25 1733 25 1843    25 1717  cefepime 2000 mg IVPB in 100 mL NS (MBP)        Ordering Provider: Ezequiel Eaton DO    2,000 mg  over 30 Minutes Intravenous Once 25 1733 25 1817              Review of Systems:  See HPI      Physical Exam:   Vital Signs  Temp (24hrs), Av.4 °F (36.9 °C), Min:97.8 °F (36.6 °C), Max:99.2 °F (37.3 °C)    Temp  Min: 97.8 °F (36.6 °C)  Max: 99.2 °F (37.3 °C)  BP  Min: 102/48  Max: 143/51  Pulse  Min: 77  Max: 118  Resp  Min: 16  Max: 20  SpO2  Min: 88 %  Max: 94 %    GENERAL: Ill and cachectic appearing.  HEENT: Normocephalic, atraumatic.  PERRL. EOMI. No conjunctival injection. No icterus. Oropharynx clear without evidence of thrush or exudate.    NECK: Supple   HEART: RRR; No murmur, rubs, gallops.   LUNGS:   Bilateral right greater than left crackles  ABDOMEN: Soft, nontender, nondistended. No rebound or guarding. NO mass or HSM.  EXT:  No cyanosis, clubbing or edema. No cord.  :  Without Ocasio catheter.  MSK: No joint effusions or erythema  SKIN: Warm and dry without cutaneous eruptions on Inspection/palpation.    NEURO: Oriented to PPT.  Motor 5/5 strength  PSYCHIATRIC: Normal insight and judgment. Cooperative with PE    Laboratory Data    Results from last 7 days   Lab Units 01/15/25  0417 25  0427 25  0344   WBC 10*3/mm3 21.94* 17.07* 12.92*   HEMOGLOBIN g/dL 10.1* 9.8* 9.1*   HEMATOCRIT % 31.4* 30.6* 28.1*   PLATELETS 10*3/mm3  866* 816* 727*     Results from last 7 days   Lab Units 01/15/25  0417   SODIUM mmol/L 131*   POTASSIUM mmol/L 4.2   CHLORIDE mmol/L 93*   CO2 mmol/L 33.0*   BUN mg/dL 5*   CREATININE mg/dL 0.26*   GLUCOSE mg/dL 105*   CALCIUM mg/dL 9.2     Results from last 7 days   Lab Units 01/11/25  1842   ALK PHOS U/L 434*   BILIRUBIN mg/dL <0.2   ALT (SGPT) U/L 51*   AST (SGOT) U/L 101*     Results from last 7 days   Lab Units 01/09/25  1622   SED RATE mm/hr 113*     Results from last 7 days   Lab Units 01/09/25  1807   CRP mg/dL 13.13*         Results from last 7 days   Lab Units 01/09/25  1807   CK TOTAL U/L 52         Estimated Creatinine Clearance: 163.3 mL/min (A) (by C-G formula based on SCr of 0.26 mg/dL (L)).      Microbiology:  Microbiology Results (last 10 days)       Procedure Component Value - Date/Time    MRSA Screen, PCR (Inpatient) - Swab, Nares [[id number]]  (Normal) Collected: 01/10/25 1435    Lab Status: Final result Specimen: Swab from Nares Updated: 01/10/25 1607     MRSA PCR Negative    Narrative:      The negative predictive value of this diagnostic test is high and should only be used to consider de-escalating anti-MRSA therapy. A positive result may indicate colonization with MRSA and must be correlated clinically.  MRSA Negative    S. Pneumo Ag Urine or CSF - Urine, Urine, Clean Catch [[id number]]  (Normal) Collected: 01/10/25 0729    Lab Status: Final result Specimen: Urine, Clean Catch Updated: 01/10/25 1353     Strep Pneumo Ag Negative    Legionella Antigen, Urine - Urine, Urine, Clean Catch [[id number]]  (Normal) Collected: 01/10/25 0729    Lab Status: Final result Specimen: Urine, Clean Catch Updated: 01/10/25 1353     LEGIONELLA ANTIGEN, URINE Negative    Respiratory Panel PCR w/COVID-19(SARS-CoV-2) MARIANELA/BRONWYN/LUZ MARINA/PAD/COR/KINGS In-House, NP Swab in UTM/VTM, 2 HR TAT - Swab, Nasopharynx [[id number]]  (Normal) Collected: 01/10/25 0133    Lab Status: Final result Specimen: Swab from Nasopharynx Updated:  01/10/25 0238     ADENOVIRUS, PCR Not Detected     Coronavirus 229E Not Detected     Coronavirus HKU1 Not Detected     Coronavirus NL63 Not Detected     Coronavirus OC43 Not Detected     COVID19 Not Detected     Human Metapneumovirus Not Detected     Human Rhinovirus/Enterovirus Not Detected     Influenza A PCR Not Detected     Influenza B PCR Not Detected     Parainfluenza Virus 1 Not Detected     Parainfluenza Virus 2 Not Detected     Parainfluenza Virus 3 Not Detected     Parainfluenza Virus 4 Not Detected     RSV, PCR Not Detected     Bordetella pertussis pcr Not Detected     Bordetella parapertussis PCR Not Detected     Chlamydophila pneumoniae PCR Not Detected     Mycoplasma pneumo by PCR Not Detected    Narrative:      In the setting of a positive respiratory panel with a viral infection PLUS a negative procalcitonin without other underlying concern for bacterial infection, consider observing off antibiotics or discontinuation of antibiotics and continue supportive care. If the respiratory panel is positive for atypical bacterial infection (Bordetella pertussis, Chlamydophila pneumoniae, or Mycoplasma pneumoniae), consider antibiotic de-escalation to target atypical bacterial infection.                  Radiology:  Imaging Results (Last 72 Hours)       ** No results found for the last 72 hours. **         I read her radiographic images.      Impression:   Multifocal pneumonia- this is likely a bacterial pneumonia superimposed on her BONI.  I will leave her on intravenous Rocephin and oral minocycline.  Mycobacterium avium complex pulmonary infection -since 2016,  with therapeutic noncompliance.  She has been rotating Levaquin, azithromycin, and rifampin d/t side effects.  She is not compliant with ID follow up. She is at high risk for developing a resistant BONI strain that will make it difficult to treat in the future.   Leukocytosis/neutrophilia-this is secondary to her steroid therapy.  End stage chronic  obstructive pulmonary disease/on 3L O2 baseline at home  Protein calorie malnutrition  Penicillin list allergy.  Seems to tolerate cephalosporins.  Medical noncompliance-this complicates all aspects of her care and increases her risk for poor outcome.    Hyponatremia-mild    PLAN/RECOMMENDATIONS:   Rocephin 2 gm IV daily  Minocycline 100 mg p.o. twice daily  Continue O2 support  Prevnar 20 pneumococcal vaccination, influenza vaccination, RSV vaccination, and COVID-19 vaccine booster in the near future  Infectious disease follow-up at -I have advised her to be compliant with her follow-up appointments  Discharge to home versus a subacute nursing facility        Az Wasserman MD  1/15/2025  08:23 EST

## 2025-01-15 NOTE — THERAPY TREATMENT NOTE
Patient Name: Kristen Jean  : 1960    MRN: [id number]                              Today's Date: 1/15/2025       Admit Date: 2025    Visit Dx:     ICD-10-CM ICD-9-CM   1. Multifocal pneumonia  J18.9 486   2. History of MAC infection  Z86.19 V12.09   3. Bandemia  D72.825 288.66   4. Hyponatremia  E87.1 276.1   5. Atrial tachycardia  I47.19 427.89   6. Aortic atherosclerosis  I70.0 440.0   7. Heart failure with improved ejection fraction (HFimpEF)  I50.32 428.32   8. Paroxysmal atrial fibrillation  I48.0 427.31   9. Severe protein-calorie malnutrition  E43 262   10. Mycobacterium avium complex  A31.0 031.2   11. Hypothyroidism (acquired)  E03.9 244.9   12. End stage COPD  J44.9 496   13. Cachexia  R64 799.4   14. Asthma-COPD overlap syndrome  J44.89 493.20   15. Centrilobular emphysema  J43.2 492.8   16. Pneumonia of right upper lobe due to infectious organism  J18.9 486   17. Mycobacterium infection  A31.9 031.9   18. Mucopurulent chronic bronchitis  J41.1 491.1     Patient Active Problem List   Diagnosis    Chronic bronchitis    Uncomplicated asthma    Mycobacterium infection    Emphysema of lung    Asthma-COPD overlap syndrome    Anxiety disorder    Multifocal pneumonia    Hyponatremia    Hypokalemia    Protein calorie malnutrition    Cachexia    End stage COPD    Hypothyroidism (acquired)    Mycobacterium avium complex    Severe protein-calorie malnutrition    Atrial fibrillation/flutter    Heart failure with improved ejection fraction (HFimpEF)    Aortic atherosclerosis    Atrial tachycardia     Past Medical History:   Diagnosis Date    Anxiety disorder 2025    Asthma-COPD overlap syndrome 2025    Breast injury     Cachexia 2025    Chronic bronchitis 2016    Emphysema of lung 2025    Hypothyroidism (acquired) 2025    Mycobacterium infection 2016    T2DM (type 2 diabetes mellitus) 2025     Past Surgical History:   Procedure Laterality Date    TOOTH  EXTRACTION      1990 and 2002      General Information       Row Name 01/15/25 1405          OT Time and Intention    Document Type therapy note (daily note)  -     Mode of Treatment occupational therapy  -       Row Name 01/15/25 1405          General Information    Patient Profile Reviewed yes  -AC     Existing Precautions/Restrictions orthostatic hypotension;oxygen therapy device and L/min;fall  -AC     Barriers to Rehab medically complex;previous functional deficit;ineffective coping  -AC       Row Name 01/15/25 1405          Cognition    Orientation Status (Cognition) oriented x 3  -AC       Row Name 01/15/25 1405          Safety Issues/Impairments Affecting Functional Mobility    Safety Issues Affecting Function (Mobility) awareness of need for assistance;insight into deficits/self-awareness;judgment;safety precaution awareness;safety precautions follow-through/compliance  -     Impairments Affecting Function (Mobility) balance;endurance/activity tolerance;strength  -AC               User Key  (r) = Recorded By, (t) = Taken By, (c) = Cosigned By      Initials Name Provider Type    AC Liyah Sharif OT Occupational Therapist                     Mobility/ADL's       Row Name 01/15/25 1405          Bed Mobility    Bed Mobility supine-sit  -AC     Supine-Sit [place] (Bed Mobility) standby assist  -AC     Assistive Device (Bed Mobility) head of bed elevated  -       Row Name 01/15/25 1405          Transfers    Transfers sit-stand transfer;toilet transfer  -       Row Name 01/15/25 1405          Sit-Stand Transfer    Sit-Stand [place] (Transfers) minimum assist (75% patient effort);1 person assist  -AC     Comment, (Sit-Stand Transfer) pt with initial post LOB  -AC       Row Name 01/15/25 1405          Toilet Transfer    Type (Toilet Transfer) stand pivot/stand step  -AC     [place] Level (Toilet Transfer) contact guard  -     Assistive Device (Toilet Transfer) commode, bedside without  drop arms  -[facility] Name 01/15/25 1405          Functional Mobility    Functional Mobility- Ind. Level contact guard assist;minimum assist (75% patient effort)  -     Functional Mobility- Device other (see comments)  -     Functional Mobility-Distance (Feet) --  12ft + 18 ft + 10 ft  -     Functional Mobility- Comment 2 LOB,  O2 sat dropped to 85% and recovered to 93% with rest and PLB  -[facility] Name 01/15/25 1405          Activities of Daily Living    BADL Assessment/Intervention lower body dressing;grooming  -[facility] Name 01/15/25 1405          Lower Body Dressing Assessment/Training    [place] Level (Lower Body Dressing) don;doff;shoes/slippers;standby assist  -     Position (Lower Body Dressing) edge of bed sitting  -[facility] Name 01/15/25 1405          Grooming Assessment/Training    [place] Level (Grooming) hair care, combing/brushing;set up;standby assist  -AC     Position (Grooming) edge of bed sitting  -AC               User Key  (r) = Recorded By, (t) = Taken By, (c) = Cosigned By      Initials Name Provider Type    Liyah Patrick, OT Occupational Therapist                   Obj/Interventions       [facility] Name 01/15/25 1522          Balance    Balance Assessment sitting static balance;sitting dynamic balance;standing static balance;standing dynamic balance  -AC     Static Sitting Balance standby assist  -AC     Dynamic Sitting Balance standby assist  -AC     Position, Sitting Balance unsupported;sitting edge of bed  -AC     Static Standing Balance contact guard  -AC     Dynamic Standing Balance minimal assist  -AC               User Key  (r) = Recorded By, (t) = Taken By, (c) = Cosigned By      Initials Name Provider Type    Liyah Patrick, OT Occupational Therapist                   Goals/Plan    No documentation.                  Clinical Impression       Row Name 01/15/25 1523          Pain Scale: FACES Pre/Post-Treatment    Pain: FACES Scale, Pretreatment 0-->no  hurt  -AC     Posttreatment Pain Rating 0-->no hurt  -AC       Row Name 01/15/25 1523          Plan of Care Review    Plan of Care Reviewed With patient  -AC     Outcome Evaluation Pt SBA to don slippers, CGA BSC transfer,  min A to ambulate without AD with 3 LOB.   Pt continues below baseline d/t weakness, decr balance and activity toelrance.  Recommend IP rehab for best outcome.  -       Row Name 01/15/25 1523          Therapy Plan Review/Discharge Plan (OT)    Anticipated Discharge Disposition (OT) inpatient rehabilitation facility  -       Row Name 01/15/25 1523          Vital Signs    Pre Systolic BP Rehab 119  -AC     Pre Treatment Diastolic BP 55  -AC     Pretreatment Heart Rate (beats/min) 100  -AC     Posttreatment Heart Rate (beats/min) 99  -AC     Pre SpO2 (%) 94  -AC     O2 Delivery Pre Treatment supplemental O2  -AC     O2 Delivery Intra Treatment supplemental O2  -AC     Post SpO2 (%) 93  -AC     O2 Delivery Post Treatment supplemental O2  -AC     Pre Patient Position Supine  -AC     Post Patient Position Sitting  -AC       Row Name 01/15/25 1523          Positioning and Restraints    Pre-Treatment Position in bed  -AC     Post Treatment Position chair  -AC     In Chair notified nsg;reclined;call light within reach;encouraged to call for assist;exit alarm on;waffle cushion  -AC               User Key  (r) = Recorded By, (t) = Taken By, (c) = Cosigned By      Initials Name Provider Type    Liyah Patrick, OT Occupational Therapist                   Outcome Measures       Row Name 01/15/25 1528          How much help from another is currently needed...    Putting on and taking off regular lower body clothing? 3  -AC     Bathing (including washing, rinsing, and drying) 3  -AC     Toileting (which includes using toilet bed pan or urinal) 3  -AC     Putting on and taking off regular upper body clothing 4  -AC     Taking care of personal grooming (such as brushing teeth) 3  -AC     Eating meals 4  -AC      -[facility] 6 Clicks Score (OT) 20  -AC       Row Name 01/15/25 1434 01/15/25 0800       How much help from another person do you currently need...    Turning from your back to your side while in flat bed without using bedrails? 4  -AS 4  -SW    Moving from lying on back to sitting on the side of a flat bed without bedrails? 4  -AS 4  -SW    Moving to and from a bed to a chair (including a wheelchair)? 3  -AS 3  -SW    Standing up from a chair using your arms (e.g., wheelchair, bedside chair)? 3  -AS 3  -SW    Climbing 3-5 steps with a railing? 2  -AS 3  -SW    To walk in hospital room? 3  -AS 3  -SW    AM-PAC 6 Clicks Score (PT) 19  -AS 20  -SW    Highest Level of Mobility Goal 6 --> Walk 10 steps or more  -AS 6 --> Walk 10 steps or more  -      Row Name 01/15/25 1528 01/15/25 1434       Functional Assessment    Outcome Measure Options AM-PAC 6 Clicks Daily Activity (OT)  -[facility] 6 Clicks Basic Mobility (PT)  -AS              User Key  (r) = Recorded By, (t) = Taken By, (c) = Cosigned By      Initials Name Provider Type    Liyah Patrick OT Occupational Therapist    AS Joyce Gotti PTA Physical Therapist Assistant    Gosia Villarreal, RN Registered Nurse                    Occupational Therapy Education       Title: PT OT SLP Therapies (In Progress)       Topic: Occupational Therapy (In Progress)       Point: ADL training (In Progress)       Description:   Instruct learner(s) on proper safety adaptation and remediation techniques during self care or transfers.   Instruct in proper use of assistive devices.                  Learning Progress Summary            Patient Acceptance, E, NR by  at 1/15/2025 1528    Acceptance, E, NR by  at 1/10/2025 0820    Comment: benefits of activity, PLB                      Point: Home exercise program (Not Started)       Description:   Instruct learner(s) on appropriate technique for monitoring, assisting and/or progressing therapeutic exercises/activities.                   Learner Progress:  Not documented in this visit.              Point: Precautions (Not Started)       Description:   Instruct learner(s) on prescribed precautions during self-care and functional transfers.                  Learner Progress:  Not documented in this visit.              Point: Body mechanics (Not Started)       Description:   Instruct learner(s) on proper positioning and spine alignment during self-care, functional mobility activities and/or exercises.                  Learner Progress:  Not documented in this visit.                              User Key       Initials Effective Dates Name Provider Type Discipline     02/03/23 -  Liyah Sharif, OT Occupational Therapist OT                  OT Recommendation and Plan  Planned Therapy Interventions (OT): activity tolerance training, BADL retraining, functional balance retraining, occupation/activity based interventions, patient/caregiver education/training, strengthening exercise, transfer/mobility retraining  Therapy Frequency (OT): daily  Plan of Care Review  Plan of Care Reviewed With: patient  Outcome Evaluation: Pt SBA to don slippers, CGA BSC transfer,  min A to ambulate without AD with 3 LOB.   Pt continues below baseline d/t weakness, decr balance and activity toelrance.  Recommend IP rehab for best outcome.     Time Calculation:   Evaluation Complexity (OT)  Review Occupational Profile/Medical/Therapy History Complexity: brief/low complexity  Assessment, Occupational Performance/Identification of Deficit Complexity: 1-3 performance deficits  Clinical Decision Making Complexity (OT): problem focused assessment/low complexity  Overall Complexity of Evaluation (OT): low complexity     Time Calculation- OT       Row Name 01/15/25 1435 01/15/25 1405          Time Calculation- OT    OT Start Time -- 1405  -AC     OT Received On -- 01/15/25  -     OT Goal Re-Cert Due Date -- 01/20/25  -        Timed Charges    63978 - Gait  Training Minutes  15  -AS --     11193 - OT Self Care/Mgmt Minutes -- 15  -AC        Total Minutes    Timed Charges Total Minutes 15  -AS 15  -AC      Total Minutes 15  -AS 15  -AC               User Key  (r) = Recorded By, (t) = Taken By, (c) = Cosigned By      Initials Name Provider Type    AC Liyah Sharif, OT Occupational Therapist    AS Joyce Gotti, PTA Physical Therapist Assistant                  Therapy Charges for Today       Code Description Service Date Service Provider Modifiers Qty    [id number]  OT SELF CARE/MGMT/TRAIN EA 15 MIN 1/15/2025 Liyah Sharif, OT GO 1                 Liyah Sharif OT  1/15/2025

## 2025-01-15 NOTE — PLAN OF CARE
Problem: Adult Inpatient Plan of Care  Goal: Plan of Care Review  Outcome: Progressing  Goal: Patient-Specific Goal (Individualized)  Outcome: Progressing  Goal: Absence of Hospital-Acquired Illness or Injury  Outcome: Progressing  Intervention: Identify and Manage Fall Risk  Recent Flowsheet Documentation  Taken 1/15/2025 1400 by Gosia Watkins RN  Safety Promotion/Fall Prevention:   activity supervised   assistive device/personal items within reach   clutter free environment maintained   toileting scheduled   safety round/check completed   room organization consistent  Taken 1/15/2025 1200 by Gosia Watkins RN  Safety Promotion/Fall Prevention:   activity supervised   assistive device/personal items within reach   clutter free environment maintained   toileting scheduled   safety round/check completed   room organization consistent  Taken 1/15/2025 1000 by Gosia Watkins RN  Safety Promotion/Fall Prevention:   activity supervised   assistive device/personal items within reach   clutter free environment maintained   toileting scheduled   room organization consistent   safety round/check completed  Taken 1/15/2025 0800 by Gosia Watkins RN  Safety Promotion/Fall Prevention:   activity supervised   assistive device/personal items within reach   clutter free environment maintained   toileting scheduled   safety round/check completed   room organization consistent  Intervention: Prevent Skin Injury  Recent Flowsheet Documentation  Taken 1/15/2025 1400 by Gosia Watkins RN  Body Position: position changed independently  Skin Protection:   drying agents applied   transparent dressing maintained  Taken 1/15/2025 1200 by Gosia Watkins RN  Body Position: position changed independently  Skin Protection:   drying agents applied   transparent dressing maintained  Taken 1/15/2025 1000 by Gosia Watkins RN  Body Position: position changed independently  Skin Protection:   drying agents applied    transparent dressing maintained  Taken 1/15/2025 0800 by Gosia Watkins RN  Body Position: position changed independently  Skin Protection:   drying agents applied   transparent dressing maintained  Goal: Optimal Comfort and Wellbeing  Outcome: Progressing  Intervention: Provide Person-Centered Care  Recent Flowsheet Documentation  Taken 1/15/2025 0800 by Gosia Watkins RN  Trust Relationship/Rapport: care explained  Goal: Readiness for Transition of Care  Outcome: Progressing     Problem: Fall Injury Risk  Goal: Absence of Fall and Fall-Related Injury  Outcome: Progressing  Intervention: Promote Injury-Free Environment  Recent Flowsheet Documentation  Taken 1/15/2025 1400 by Gosia Watkins RN  Safety Promotion/Fall Prevention:   activity supervised   assistive device/personal items within reach   clutter free environment maintained   toileting scheduled   safety round/check completed   room organization consistent  Taken 1/15/2025 1200 by Gosia Watkins RN  Safety Promotion/Fall Prevention:   activity supervised   assistive device/personal items within reach   clutter free environment maintained   toileting scheduled   safety round/check completed   room organization consistent  Taken 1/15/2025 1000 by Gosia Watkins RN  Safety Promotion/Fall Prevention:   activity supervised   assistive device/personal items within reach   clutter free environment maintained   toileting scheduled   room organization consistent   safety round/check completed  Taken 1/15/2025 0800 by Gosia Watkins RN  Safety Promotion/Fall Prevention:   activity supervised   assistive device/personal items within reach   clutter free environment maintained   toileting scheduled   safety round/check completed   room organization consistent     Problem: Pain Acute  Goal: Optimal Pain Control and Function  Outcome: Progressing     Problem: Infection  Goal: Absence of Infection Signs and Symptoms  Outcome: Progressing     Problem:  Sepsis/Septic Shock  Goal: Optimal Coping  Outcome: Progressing  Goal: Absence of Bleeding  Outcome: Progressing  Goal: Blood Glucose Level Within Target Range  Outcome: Progressing  Goal: Absence of Infection Signs and Symptoms  Outcome: Progressing  Intervention: Promote Recovery  Recent Flowsheet Documentation  Taken 1/15/2025 1400 by Gosia Watkins RN  Activity Management: activity encouraged  Taken 1/15/2025 1200 by Gosia Watkins RN  Activity Management: activity encouraged  Taken 1/15/2025 1000 by Gosia Watkins RN  Activity Management: activity encouraged  Taken 1/15/2025 0800 by Gosia Watkins RN  Activity Management: activity encouraged  Goal: Optimal Nutrition Delivery  Outcome: Progressing   Goal Outcome Evaluation:

## 2025-01-16 LAB
ANION GAP SERPL CALCULATED.3IONS-SCNC: 8 MMOL/L (ref 5–15)
B PARAPERT DNA SPEC QL NAA+PROBE: NOT DETECTED
B PERT DNA SPEC QL NAA+PROBE: NOT DETECTED
BASOPHILS # BLD AUTO: 0.09 10*3/MM3 (ref 0–0.2)
BASOPHILS NFR BLD AUTO: 0.3 % (ref 0–1.5)
BUN SERPL-MCNC: 5 MG/DL (ref 8–23)
BUN/CREAT SERPL: 20.8 (ref 7–25)
C PNEUM DNA NPH QL NAA+NON-PROBE: NOT DETECTED
CALCIUM SPEC-SCNC: 8.4 MG/DL (ref 8.6–10.5)
CHLORIDE SERPL-SCNC: 91 MMOL/L (ref 98–107)
CO2 SERPL-SCNC: 30 MMOL/L (ref 22–29)
CREAT SERPL-MCNC: 0.24 MG/DL (ref 0.57–1)
DEPRECATED RDW RBC AUTO: 44.6 FL (ref 37–54)
EGFRCR SERPLBLD CKD-EPI 2021: 126 ML/MIN/1.73
EOSINOPHIL # BLD AUTO: 1.16 10*3/MM3 (ref 0–0.4)
EOSINOPHIL NFR BLD AUTO: 3.9 % (ref 0.3–6.2)
ERYTHROCYTE [DISTWIDTH] IN BLOOD BY AUTOMATED COUNT: 15.1 % (ref 12.3–15.4)
FLUAV SUBTYP SPEC NAA+PROBE: NOT DETECTED
FLUBV RNA ISLT QL NAA+PROBE: NOT DETECTED
GLUCOSE SERPL-MCNC: 115 MG/DL (ref 65–99)
HADV DNA SPEC NAA+PROBE: NOT DETECTED
HCOV 229E RNA SPEC QL NAA+PROBE: NOT DETECTED
HCOV HKU1 RNA SPEC QL NAA+PROBE: NOT DETECTED
HCOV NL63 RNA SPEC QL NAA+PROBE: NOT DETECTED
HCOV OC43 RNA SPEC QL NAA+PROBE: NOT DETECTED
HCT VFR BLD AUTO: 30.5 % (ref 34–46.6)
HGB BLD-MCNC: 9.9 G/DL (ref 12–15.9)
HMPV RNA NPH QL NAA+NON-PROBE: NOT DETECTED
HPIV1 RNA ISLT QL NAA+PROBE: NOT DETECTED
HPIV2 RNA SPEC QL NAA+PROBE: NOT DETECTED
HPIV3 RNA NPH QL NAA+PROBE: NOT DETECTED
HPIV4 P GENE NPH QL NAA+PROBE: NOT DETECTED
IMM GRANULOCYTES # BLD AUTO: 0.42 10*3/MM3 (ref 0–0.05)
IMM GRANULOCYTES NFR BLD AUTO: 1.4 % (ref 0–0.5)
LYMPHOCYTES # BLD AUTO: 1.97 10*3/MM3 (ref 0.7–3.1)
LYMPHOCYTES NFR BLD AUTO: 6.6 % (ref 19.6–45.3)
M PNEUMO IGG SER IA-ACNC: NOT DETECTED
MCH RBC QN AUTO: 26.5 PG (ref 26.6–33)
MCHC RBC AUTO-ENTMCNC: 32.5 G/DL (ref 31.5–35.7)
MCV RBC AUTO: 81.6 FL (ref 79–97)
MONOCYTES # BLD AUTO: 1.43 10*3/MM3 (ref 0.1–0.9)
MONOCYTES NFR BLD AUTO: 4.8 % (ref 5–12)
NEUTROPHILS NFR BLD AUTO: 24.59 10*3/MM3 (ref 1.7–7)
NEUTROPHILS NFR BLD AUTO: 83 % (ref 42.7–76)
NRBC BLD AUTO-RTO: 0 /100 WBC (ref 0–0.2)
PLATELET # BLD AUTO: 977 10*3/MM3 (ref 140–450)
PMV BLD AUTO: 8.6 FL (ref 6–12)
POTASSIUM SERPL-SCNC: 4 MMOL/L (ref 3.5–5.2)
RBC # BLD AUTO: 3.74 10*6/MM3 (ref 3.77–5.28)
RHINOVIRUS RNA SPEC NAA+PROBE: NOT DETECTED
RSV RNA NPH QL NAA+NON-PROBE: NOT DETECTED
SARS-COV-2 RNA NPH QL NAA+NON-PROBE: NOT DETECTED
SODIUM SERPL-SCNC: 129 MMOL/L (ref 136–145)
WBC NRBC COR # BLD AUTO: 29.66 10*3/MM3 (ref 3.4–10.8)

## 2025-01-16 PROCEDURE — 63710000001 PREDNISONE PER 1 MG: Performed by: INTERNAL MEDICINE

## 2025-01-16 PROCEDURE — 94799 UNLISTED PULMONARY SVC/PX: CPT

## 2025-01-16 PROCEDURE — 80048 BASIC METABOLIC PNL TOTAL CA: CPT | Performed by: INTERNAL MEDICINE

## 2025-01-16 PROCEDURE — 25010000002 CEFTRIAXONE PER 250 MG: Performed by: INTERNAL MEDICINE

## 2025-01-16 PROCEDURE — 0202U NFCT DS 22 TRGT SARS-COV-2: CPT | Performed by: STUDENT IN AN ORGANIZED HEALTH CARE EDUCATION/TRAINING PROGRAM

## 2025-01-16 PROCEDURE — 85025 COMPLETE CBC W/AUTO DIFF WBC: CPT | Performed by: INTERNAL MEDICINE

## 2025-01-16 PROCEDURE — 94664 DEMO&/EVAL PT USE INHALER: CPT

## 2025-01-16 PROCEDURE — 94761 N-INVAS EAR/PLS OXIMETRY MLT: CPT

## 2025-01-16 PROCEDURE — 99232 SBSQ HOSP IP/OBS MODERATE 35: CPT | Performed by: STUDENT IN AN ORGANIZED HEALTH CARE EDUCATION/TRAINING PROGRAM

## 2025-01-16 RX ORDER — HYDROCODONE POLISTIREX AND CHLORPHENIRAMINE POLISTIREX 10; 8 MG/5ML; MG/5ML
2.5 SUSPENSION, EXTENDED RELEASE ORAL EVERY 12 HOURS PRN
Status: DISCONTINUED | OUTPATIENT
Start: 2025-01-16 | End: 2025-01-20 | Stop reason: HOSPADM

## 2025-01-16 RX ADMIN — IPRATROPIUM BROMIDE AND ALBUTEROL SULFATE 3 ML: 2.5; .5 SOLUTION RESPIRATORY (INHALATION) at 10:23

## 2025-01-16 RX ADMIN — APIXABAN 5 MG: 5 TABLET, FILM COATED ORAL at 20:52

## 2025-01-16 RX ADMIN — IPRATROPIUM BROMIDE AND ALBUTEROL SULFATE 3 ML: 2.5; .5 SOLUTION RESPIRATORY (INHALATION) at 21:05

## 2025-01-16 RX ADMIN — SODIUM CHLORIDE 2000 MG: 900 INJECTION INTRAVENOUS at 08:31

## 2025-01-16 RX ADMIN — APIXABAN 5 MG: 5 TABLET, FILM COATED ORAL at 08:30

## 2025-01-16 RX ADMIN — METOPROLOL TARTRATE 25 MG: 25 TABLET, FILM COATED ORAL at 20:52

## 2025-01-16 RX ADMIN — LORAZEPAM 0.25 MG: 0.5 TABLET ORAL at 20:58

## 2025-01-16 RX ADMIN — MINOCYCLINE HYDROCHLORIDE 100 MG: 50 CAPSULE ORAL at 08:30

## 2025-01-16 RX ADMIN — MINOCYCLINE HYDROCHLORIDE 100 MG: 50 CAPSULE ORAL at 20:52

## 2025-01-16 RX ADMIN — Medication 10 ML: at 08:31

## 2025-01-16 RX ADMIN — IPRATROPIUM BROMIDE AND ALBUTEROL SULFATE 3 ML: 2.5; .5 SOLUTION RESPIRATORY (INHALATION) at 13:02

## 2025-01-16 RX ADMIN — PANTOPRAZOLE SODIUM 40 MG: 40 TABLET, DELAYED RELEASE ORAL at 20:52

## 2025-01-16 RX ADMIN — IPRATROPIUM BROMIDE AND ALBUTEROL SULFATE 3 ML: 2.5; .5 SOLUTION RESPIRATORY (INHALATION) at 15:49

## 2025-01-16 RX ADMIN — FLUTICASONE FUROATE AND VILANTEROL 1 PUFF: 200; 25 POWDER RESPIRATORY (INHALATION) at 21:05

## 2025-01-16 RX ADMIN — Medication 5 MG: at 20:52

## 2025-01-16 RX ADMIN — HYDROCODONE POLISTIREX AND CHLORPHENIRAMINE POLISTIREX 2.5 ML: 10; 8 SUSPENSION, EXTENDED RELEASE ORAL at 11:22

## 2025-01-16 RX ADMIN — LORAZEPAM 0.25 MG: 0.5 TABLET ORAL at 08:42

## 2025-01-16 RX ADMIN — PREDNISONE 20 MG: 20 TABLET ORAL at 08:30

## 2025-01-16 RX ADMIN — ALBUTEROL SULFATE 2.5 MG: 2.5 SOLUTION RESPIRATORY (INHALATION) at 04:15

## 2025-01-16 NOTE — PAYOR COMM NOTE
"Ref# [id number]   Clinical Update    CORINA Serrano, RN  Utilization Review  Phone [phone]  Fax [phone]    [place] [address]           Kristen Terrell (64 y.o. Female)       Date of Birth   1960    Social Security Number       Address   [address] [place] [address]    Home Phone   [phone]    MRN   [id number]       Advent   None    Marital Status   Single                            Admission Date   1/9/25    Admission Type   Emergency    Admitting Provider   Wojciech Stone DO    Attending Provider   Wojciech Stone DO    Department, Room/Bed   [facility] 5G, S559/1       Discharge Date       Discharge Disposition       Discharge Destination                                 Attending Provider: Wojciech Stone DO    Allergies: Codeine, Fluticasone-salmeterol, Penicillins    Isolation: None   Infection: None   Code Status: CPR    Ht: 167.6 cm (66\")   Wt: 46.7 kg (103 lb)    Admission Cmt: None   Principal Problem: Multifocal pneumonia [J18.9]                   Active Insurance as of 1/9/2025       Primary Coverage       Payor Plan Insurance Group Employer/Plan Group    [employer] [place] [employer] [place]ICA[place]        Payor Plan Address Payor Plan Phone Number Payor Plan Fax Number Effective Dates    [address] [address] [phone]  7/28/2016 - None Entered    [facility] [address]         Subscriber Name Subscriber Birth Date Member ID       KRISTEN TERRELL 1960 [id number]                     Emergency Contacts        (Rel.) Home Phone Work Phone Mobile Phone    BRADYMAY (Friend) [phone] -- [phone]              Current Facility-Administered Medications   Medication Dose Route Frequency Provider Last Rate Last Admin    !!! Please obtain patients home medications that are stored in central pharmacy (including Breo inhaler in omnicell) and return to patient prior to discharge!   Not Applicable BID Nicole Ch, " PharmD   Given at 01/10/25 2337    acetaminophen (TYLENOL) tablet 650 mg  650 mg Oral Q4H PRN Rosi Medina MD   650 mg at 01/15/25 1747    Or    acetaminophen (TYLENOL) 160 MG/5ML oral solution 650 mg  650 mg Oral Q4H PRN Rosi Medina MD        Or    acetaminophen (TYLENOL) suppository 650 mg  650 mg Rectal Q4H PRN Rosi Medina MD        albuterol (PROVENTIL) nebulizer solution 0.083% 2.5 mg/3mL  2.5 mg Nebulization Q4H PRN Luis Lanier MD   2.5 mg at 01/16/25 0415    apixaban (ELIQUIS) tablet 5 mg  5 mg Oral Q12H Wayne Salcedo IV, MD   5 mg at 01/16/25 0830    benzonatate (TESSALON) capsule 100 mg  100 mg Oral TID PRN Rosi Medina MD   100 mg at 01/15/25 2108    sennosides-docusate (PERICOLACE) 8.6-50 MG per tablet 2 tablet  2 tablet Oral BID PRN Rosi Medina MD        And    polyethylene glycol (MIRALAX) packet 17 g  17 g Oral Daily PRN Rosi Medina MD        And    bisacodyl (DULCOLAX) EC tablet 5 mg  5 mg Oral Daily PRN Rosi Medina MD        And    bisacodyl (DULCOLAX) suppository 10 mg  10 mg Rectal Daily PRN Rosi Medina MD        Calcium Replacement - Follow Nurse / BPA Driven Protocol   Not Applicable PRN Rosi Medina MD        cefTRIAXone (ROCEPHIN) 2,000 mg in sodium chloride 0.9 % 100 mL MBP  2,000 mg Intravenous Q24H Az Wasserman  mL/hr at 01/16/25 0831 2,000 mg at 01/16/25 0831    dilTIAZem CD (CARDIZEM CD) 24 hr capsule 180 mg  180 mg Oral Q24H Sherry Darden APRN   180 mg at 01/15/25 0839    Fluticasone Furoate-Vilanterol (BREO ELLIPTA) 200-25 MCG/ACT inhaler 1 puff **Patient Supplied Med**  1 puff Inhalation Daily - RT Dorian Sinha, RP   1 puff at 01/15/25 2104    Whitt Cough Drops (lozenges)  1 lozenge Buccal Q2H PRN Marlen Blevins, QUE   1 lozenge at 01/11/25 1754    Hydrocod Varun-Chlorphe Varun ER (TUSSIONEX PENNKINETIC) 10-8 MG/5ML ER suspension 2.5 mL  2.5 mL Oral Q12H PRN Wojciech Stone,    2.5 mL at  01/16/25 1122    ipratropium-albuterol (DUO-NEB) nebulizer solution 3 mL  3 mL Nebulization 4x Daily - RT Rosi Medina MD   3 mL at 01/16/25 1302    lactated ringers bolus 500 mL  500 mL Intravenous Once Rosi Medina MD        LORazepam (ATIVAN) tablet 0.25 mg  0.25 mg Oral Q6H PRN Franchesca Stewart MD   0.25 mg at 01/16/25 0842    Magnesium Standard Dose Replacement - Follow Nurse / BPA Driven Protocol   Not Applicable PRN Rosi Medina MD        melatonin tablet 5 mg  5 mg Oral Nightly Rosi Medina MD        metoprolol tartrate (LOPRESSOR) tablet 25 mg  25 mg Oral Q12H Wayne Salcedo IV, MD   25 mg at 01/15/25 2108    minocycline (MINOCIN,DYNACIN) capsule 100 mg  100 mg Oral Q12H Az Wasserman MD   100 mg at 01/16/25 0830    nitroglycerin (NITROSTAT) SL tablet 0.4 mg  0.4 mg Sublingual Q5 Min PRN Rosi Medina MD        pantoprazole (PROTONIX) EC tablet 40 mg  40 mg Oral Nightly Rosi Medina MD   40 mg at 01/15/25 2108    Pharmacy Consult   Not Applicable Continuous PRN Luis Lanier MD        Pharmacy Meds to Bed Consult   Not Applicable Daily Radha Carballo APRN        phenol (CHLORASEPTIC) 1.4 % liquid 1 spray  1 spray Mouth/Throat Q2H PRN Franchesca Stewart MD        Phosphorus Replacement - Follow Nurse / BPA Driven Protocol   Not Applicable PRN Rosi Medina MD        Potassium Replacement - Follow Nurse / BPA Driven Protocol   Not Applicable PRN Rosi Medina MD        predniSONE (DELTASONE) tablet 20 mg  20 mg Oral Daily With Breakfast Luis Lanier MD   20 mg at 01/16/25 0830    sodium chloride 0.9 % flush 10 mL  10 mL Intravenous PRN Rosi Medina MD        sodium chloride 0.9 % flush 10 mL  10 mL Intravenous Q12H Rosi Medina MD   10 mL at 01/16/25 0831    sodium chloride 0.9 % flush 10 mL  10 mL Intravenous PRN Rosi Medina MD        sodium chloride 0.9 % infusion 40 mL  40 mL Intravenous PRN Carol,  MD Rosi         Lab Results (last 48 hours)       Procedure Component Value Units Date/Time    Respiratory Panel PCR w/COVID-19(SARS-CoV-2) MARIANELA/BRONWYN/LUZ MARINA/PAD/COR/KINGS In-House, NP Swab in UTM/VTM, 2 HR TAT - Swab, Nasopharynx [[id number]]  (Normal) Collected: 01/16/25 1345    Specimen: Swab from Nasopharynx Updated: 01/16/25 1452     ADENOVIRUS, PCR Not Detected     Coronavirus 229E Not Detected     Coronavirus HKU1 Not Detected     Coronavirus NL63 Not Detected     Coronavirus OC43 Not Detected     COVID19 Not Detected     Human Metapneumovirus Not Detected     Human Rhinovirus/Enterovirus Not Detected     Influenza A PCR Not Detected     Influenza B PCR Not Detected     Parainfluenza Virus 1 Not Detected     Parainfluenza Virus 2 Not Detected     Parainfluenza Virus 3 Not Detected     Parainfluenza Virus 4 Not Detected     RSV, PCR Not Detected     Bordetella pertussis pcr Not Detected     Bordetella parapertussis PCR Not Detected     Chlamydophila pneumoniae PCR Not Detected     Mycoplasma pneumo by PCR Not Detected    Narrative:      In the setting of a positive respiratory panel with a viral infection PLUS a negative procalcitonin without other underlying concern for bacterial infection, consider observing off antibiotics or discontinuation of antibiotics and continue supportive care. If the respiratory panel is positive for atypical bacterial infection (Bordetella pertussis, Chlamydophila pneumoniae, or Mycoplasma pneumoniae), consider antibiotic de-escalation to target atypical bacterial infection.    Beta Strep Culture, Throat - Swab, Throat [[id number]]  (Normal) Collected: 01/15/25 1333    Specimen: Swab from Throat Updated: 01/16/25 0813     Throat Culture, Beta Strep No Beta Hemolytic Streptococcus Isolated    Narrative:      Group A Strep incidence is low in adults. Positive culture for Beta hemolytic Streptococcus species can reflect colonization and not true infection. Please correlate clinically.     Basic Metabolic Panel [[id number]]  (Abnormal) Collected: 01/16/25 0406    Specimen: Blood Updated: 01/16/25 0440     Glucose 115 mg/dL      BUN 5 mg/dL      Creatinine 0.24 mg/dL      Sodium 129 mmol/L      Potassium 4.0 mmol/L      Chloride 91 mmol/L      CO2 30.0 mmol/L      Calcium 8.4 mg/dL      BUN/Creatinine Ratio 20.8     Anion Gap 8.0 mmol/L      eGFR 126.0 mL/min/1.73     Narrative:      GFR Categories in Chronic Kidney Disease (CKD)      GFR Category          GFR (mL/min/1.73)    Interpretation  G1                     90 or greater         Normal or high (1)  G2                      60-89                Mild decrease (1)  G3a                   45-59                Mild to moderate decrease  G3b                   30-44                Moderate to severe decrease  G4                    15-29                Severe decrease  G5                    14 or less           Kidney failure          (1)In the absence of evidence of kidney disease, neither GFR category G1 or G2 fulfill the criteria for CKD.    eGFR calculation 2021 CKD-EPI creatinine equation, which does not include race as a factor    CBC & Differential [[id number]]  (Abnormal) Collected: 01/16/25 0406    Specimen: Blood Updated: 01/16/25 0423    Narrative:      The following orders were created for panel order CBC & Differential.  Procedure                               Abnormality         Status                     ---------                               -----------         ------                     CBC Auto Differential[[id number]]        Abnormal            Final result                 Please view results for these tests on the individual orders.    CBC Auto Differential [[id number]]  (Abnormal) Collected: 01/16/25 0406    Specimen: Blood Updated: 01/16/25 0423     WBC 29.66 10*3/mm3      RBC 3.74 10*6/mm3      Hemoglobin 9.9 g/dL      Hematocrit 30.5 %      MCV 81.6 fL      MCH 26.5 pg      MCHC 32.5 g/dL      RDW 15.1 %      RDW-SD 44.6 fl      MPV  8.6 fL      Platelets 977 10*3/mm3      Neutrophil % 83.0 %      Lymphocyte % 6.6 %      Monocyte % 4.8 %      Eosinophil % 3.9 %      Basophil % 0.3 %      Immature Grans % 1.4 %      Neutrophils, Absolute 24.59 10*3/mm3      Lymphocytes, Absolute 1.97 10*3/mm3      Monocytes, Absolute 1.43 10*3/mm3      Eosinophils, Absolute 1.16 10*3/mm3      Basophils, Absolute 0.09 10*3/mm3      Immature Grans, Absolute 0.42 10*3/mm3      nRBC 0.0 /100 WBC     Basic Metabolic Panel [[id number]]  (Abnormal) Collected: 01/15/25 0417    Specimen: Blood Updated: 01/15/25 0507     Glucose 105 mg/dL      BUN 5 mg/dL      Creatinine 0.26 mg/dL      Sodium 131 mmol/L      Potassium 4.2 mmol/L      Chloride 93 mmol/L      CO2 33.0 mmol/L      Calcium 9.2 mg/dL      BUN/Creatinine Ratio 19.2     Anion Gap 5.0 mmol/L      eGFR 123.6 mL/min/1.73     Narrative:      GFR Categories in Chronic Kidney Disease (CKD)      GFR Category          GFR (mL/min/1.73)    Interpretation  G1                     90 or greater         Normal or high (1)  G2                      60-89                Mild decrease (1)  G3a                   45-59                Mild to moderate decrease  G3b                   30-44                Moderate to severe decrease  G4                    15-29                Severe decrease  G5                    14 or less           Kidney failure          (1)In the absence of evidence of kidney disease, neither GFR category G1 or G2 fulfill the criteria for CKD.    eGFR calculation 2021 CKD-EPI creatinine equation, which does not include race as a factor    CBC & Differential [[id number]]  (Abnormal) Collected: 01/15/25 0417    Specimen: Blood Updated: 01/15/25 0451    Narrative:      The following orders were created for panel order CBC & Differential.  Procedure                               Abnormality         Status                     ---------                               -----------         ------                     CBC  Auto Differential[[id number]]        Abnormal            Final result                 Please view results for these tests on the individual orders.    CBC Auto Differential [[id number]]  (Abnormal) Collected: 01/15/25 0417    Specimen: Blood Updated: 01/15/25 0451     WBC 21.94 10*3/mm3      RBC 3.80 10*6/mm3      Hemoglobin 10.1 g/dL      Hematocrit 31.4 %      MCV 82.6 fL      MCH 26.6 pg      MCHC 32.2 g/dL      RDW 15.1 %      RDW-SD 45.4 fl      MPV 8.5 fL      Platelets 866 10*3/mm3      Neutrophil % 75.7 %      Lymphocyte % 11.3 %      Monocyte % 6.0 %      Eosinophil % 4.9 %      Basophil % 0.4 %      Immature Grans % 1.7 %      Neutrophils, Absolute 16.61 10*3/mm3      Lymphocytes, Absolute 2.49 10*3/mm3      Monocytes, Absolute 1.31 10*3/mm3      Eosinophils, Absolute 1.07 10*3/mm3      Basophils, Absolute 0.09 10*3/mm3      Immature Grans, Absolute 0.37 10*3/mm3      nRBC 0.0 /100 WBC           Imaging Results (Last 48 Hours)       ** No results found for the last 48 hours. **             Physician Progress Notes (last 72 hours)        Franchesca Stewart MD at 01/15/25 [facility]              [facility] Medicine Services  PROGRESS NOTE    Patient Name: Kristen Jean  : 1960  MRN: [id number]    Date of Admission: 2025  Primary Care Physician: Moiz Nova MD    Subjective   Subjective     CC:  SOA    HPI:  Pt states that she still doesn't feel ready to go today. Wants to wait another day just to make sure.  CM reports that patient is now interested in rehab.     Objective   Objective     Vital Signs:   Temp:  [98.2 °F (36.8 °C)-99.2 °F (37.3 °C)] 99.2 °F (37.3 °C)  Heart Rate:  [] 83  Resp:  [16-20] 18  BP: (116-127)/(44-61) 119/55  Flow (L/min) (Oxygen Therapy):  [4-5] 5     Physical Exam:  Constitutional: No acute distress, awake, alert, cachetic appearing WF in NAD  HENT: NCAT, mucous membranes moist  Respiratory: diffuse rhonchi bilaterally   Cardiovascular:  RRR,  no murmurs, rubs, or gallops  Gastrointestinal: Positive bowel sounds, soft, nontender, nondistended  Musculoskeletal: No bilateral ankle edema  Psychiatric: Appropriate affect, cooperative  Neurologic: Oriented x 3, strength symmetric in all extremities, Cranial Nerves grossly intact to confrontation, speech clear  Skin: No rashes      Results Reviewed:  LAB RESULTS:      Lab 01/15/25  0417 01/14/25  0427 01/13/25  0344 01/12/25  0554 01/11/25  2135 01/11/25  1859 01/11/25  1842 01/11/25  0408 01/09/25  1807 01/09/25  1622   WBC 21.94* 17.07* 12.92* 11.64*  --  15.32*  --  13.89*  --  16.67*   HEMOGLOBIN 10.1* 9.8* 9.1* 9.4*  --  10.4*  --  9.6*  --  10.1*   HEMATOCRIT 31.4* 30.6* 28.1* 28.8*  --  32.1*  --  29.4*  --  30.4*   PLATELETS 866* 816* 727* 648*  --  687*  --  509*  --  493*   NEUTROS ABS 16.61* 12.23* 8.84* 7.69*  --   --   --  9.68*  --  12.88*   IMMATURE GRANS (ABS) 0.37* 0.30* 0.21* 0.17*  --   --   --  0.15*  --  0.09*   LYMPHS ABS 2.49 2.28 1.95 1.76  --   --   --  1.60  --  1.74   MONOS ABS 1.31* 1.32* 1.18* 1.16*  --   --   --  1.41*  --  1.50*   EOS ABS 1.07* 0.88* 0.69* 0.80*  --   --   --  0.99*  --  0.42*   MCV 82.6 82.5 81.9 81.8  --  82.5  --  81.2  --  79.8   SED RATE  --   --   --   --   --   --   --   --   --  113*   CRP  --   --   --   --   --   --   --   --  13.13*  --    PROCALCITONIN  --   --   --   --   --   --   --   --  0.09  --    HSTROP T  --   --   --   --  14*  --  12  --  12 11         Lab 01/15/25  0417 01/14/25  0427 01/13/25  0344 01/12/25  0555 01/11/25  1842 01/11/25  0408 01/09/25  1807 01/09/25  1622   SODIUM 131* 130* 131* 134* 132* 129*  --  127*   POTASSIUM 4.2 4.1 4.0 4.2 4.6  4.6 3.1*  --  3.4*   CHLORIDE 93* 93* 95* 98 97* 93*  --  90*   CO2 33.0* 31.0* 30.0* 29.0 25.0 27.0  --  27.0   ANION GAP 5.0 6.0 6.0 7.0 10.0 9.0  --  10.0   BUN 5* 4* 4* 3* 3* 3*  --  5*   CREATININE 0.26* 0.22* 0.26* 0.19* 0.26* 0.24*  --  0.31*   EGFR 123.6 128.6 123.6 133.3 123.6  126.0  --  118.4   GLUCOSE 105* 93 98 99 115* 116*  --  131*   CALCIUM 9.2 9.1 8.5* 8.8 8.8 8.6  --  9.1   MAGNESIUM  --  2.1 2.0 2.2 1.8 1.8  --   --    PHOSPHORUS  --   --   --  3.6 1.8*  --   --   --    HEMOGLOBIN A1C  --   --   --   --   --   --   --  6.00*   TSH  --   --   --   --   --   --  0.160*  --          Lab 01/11/25  1842 01/09/25  1622   TOTAL PROTEIN 5.9* 6.1   ALBUMIN 3.0* 3.2*   GLOBULIN 2.9 2.9   ALT (SGPT) 51* 21   AST (SGOT) 101* 29   BILIRUBIN <0.2 0.3   ALK PHOS 434* 201*   LIPASE  --  18         Lab 01/11/25  2135 01/11/25  1842 01/09/25  1807 01/09/25  1622   PROBNP  --   --   --  264.7   HSTROP T 14* 12 12 11         Lab 01/09/25  1807   CHOLESTEROL 144   LDL CHOL 88   HDL CHOL 41   TRIGLYCERIDES 74             Brief Urine Lab Results  (Last result in the past 365 days)        Color   Clarity   Blood   Leuk Est   Nitrite   Protein   CREAT   Urine HCG        01/10/25 0730 Yellow   Clear   Negative   Negative   Negative   Negative                   Microbiology Results Abnormal       None            No radiology results from the last 24 hrs    Results for orders placed during the hospital encounter of 01/09/25    Adult Transthoracic Echo Complete W/ Cont if Necessary Per Protocol    Interpretation Summary    Left ventricular systolic function is hyperdynamic (EF > 70%).    No hemodynamically significant valvular disease present.    The right ventricular cavity is mildly dilated.    Estimated right ventricular systolic pressure from tricuspid regurgitation is normal (<35 mmHg).    There is a left pleural effusion.      Current medications:  Scheduled Meds:Pharmacy Consult, , Not Applicable, BID  apixaban, 5 mg, Oral, Q12H  cefTRIAXone, 2,000 mg, Intravenous, Q24H  dilTIAZem CD, 180 mg, Oral, Q24H  Fluticasone Furoate-Vilanterol, 1 puff, Inhalation, Daily - RT  ipratropium-albuterol, 3 mL, Nebulization, 4x Daily - RT  lactated ringers, 500 mL, Intravenous, Once  melatonin, 5 mg, Oral,  Nightly  metoprolol tartrate, 25 mg, Oral, Q12H  minocycline, 100 mg, Oral, Q12H  pantoprazole, 40 mg, Oral, Nightly  Pharmacy Meds to Bed Consult, , Not Applicable, Daily  predniSONE, 20 mg, Oral, Daily With Breakfast  sodium chloride, 10 mL, Intravenous, Q12H      Continuous Infusions:Pharmacy Consult,       PRN Meds:.  acetaminophen **OR** acetaminophen **OR** acetaminophen    Albuterol Sulfate NEB Orderable    benzonatate    senna-docusate sodium **AND** polyethylene glycol **AND** bisacodyl **AND** bisacodyl    Calcium Replacement - Follow Nurse / BPA Driven Protocol    Halls Cough Drops    Hydrocod Varun-Chlorphe Varun ER    LORazepam    Magnesium Standard Dose Replacement - Follow Nurse / BPA Driven Protocol    nitroglycerin    Pharmacy Consult    Phosphorus Replacement - Follow Nurse / BPA Driven Protocol    Potassium Replacement - Follow Nurse / BPA Driven Protocol    sodium chloride    sodium chloride    sodium chloride    Assessment & Plan   Assessment & Plan     Active Hospital Problems    Diagnosis  POA    **Multifocal pneumonia [J18.9]  Yes    Atrial tachycardia [I47.19]  No    Severe protein-calorie malnutrition [E43]  Yes    Atrial fibrillation/flutter [I48.91]  Yes    Heart failure with improved ejection fraction (HFimpEF) [I50.32]  Yes    Aortic atherosclerosis [I70.0]  Yes    Emphysema of lung [J43.9]  Yes    Asthma-COPD overlap syndrome [J44.89]  Yes    Anxiety disorder [F41.9]  Yes    Hyponatremia [E87.1]  Yes    Hypokalemia [E87.6]  Yes    Protein calorie malnutrition [E46]  Yes    Cachexia [R64]  Yes    End stage COPD [J44.9]  Yes    Hypothyroidism (acquired) [E03.9]  Yes    Mycobacterium avium complex [A31.0]  Yes    Chronic bronchitis [J42]  Yes    Mycobacterium infection [A31.9]  Yes      Resolved Hospital Problems    Diagnosis Date Resolved POA    Pneumonia [J18.9] 01/11/2025 Yes    T2DM (type 2 diabetes mellitus) [E11.9] 01/11/2025 Yes        Brief Hospital Course to date:  Kristen Jean is a  64 y.o. female  with a PMH significant for pulmonary Mycobacterium avium complex diagnosed 2015, end-stage COPD, COPD/asthma overlap syndrome, advanced emphysema, anxiety and protein calorie malnutrition with COPD cachexia who presented to [facility] ED secondary to worsening shortness of breath, higher oxygen requirements, right-sided pleuritic chest pain, pain in lower chest on the right side and was found to have multifocal PNA.    This patient's problems and plans were partially entered by my partner and updated as appropriate by me 01/15/25.    Plan:     Pneumonia  Multifocal pneumonia  Mycobacterium avium complex  End-stage COPD  Asthma - COPD overlap syndrome  Emphysema of the lung  Chronic bronchitis  Patient has a history that is significant for advanced COPD with a history of asthma - COPD overlap syndrome, follows [facility] pulmonology  - Does have a history of Mycobacterium AVM complex, appears on CT that her previous RLL nodule has decreased in size  - CT chest without contrast revealed multifocal pneumonia more pronounced RML/RLL  - MRSA swab, streptococcal Legionella urine antigens negative, respiratory culture PENDING   -- respiratory viral PCR panel negative  - Consulted ID,  will require guidance on antimicrobial regimen and duration. Appreciate Dr. Wasserman's assistance. Continue Rocephin/Minocycline per his recs. Plan for solely Minocycline upon d/c   - Consulted pulmonology due to extent of pulmonary disease  - DuoNebs, Tessalon Perles and albuterol inhaler  - Continue nasal cannula oxygen and wean as tolerated     Hyponatremia  Hypokalemia  Suspect SIADH in the setting of pneumonia versus hypovolemic hyponatremia  - s/p IVFs  -- Na+ stable  - Potassium replacement protocol in place    New Onset Afib with RVR  ?SVT  -- started dilt gtt, continue as BP allows  -- consult Cardiology given new diagnosis, appreciate their assistance  -- started on Eliquis, [id number]  -- given  IV digoxin but remained tachycardic   -- EKG repeated and concern for SVT   -- pt refused IV Adenosine initially, however, eventually agreed. Briefly HR was better, but then went back to 160s.  Resumed dilt gtt at that point.   -- now off dilt gtt and in NSR  -- continue with Metoprolol and PO Diltiazem as per Cardiology  -- TTE with normal EF      Protein calorie malnutrition  Cachexia of end-stage lung disease  Patient has low albumin, appears cachectic likely secondary to advanced lung disease  - Nutrition consult for assessment of malnutrition     Goals of care  -- pt still full code for time being.  Will continue to discuss with her given her poor overall prognosis      Low TSH  -- Likely subclinical thyroiditis with low TSH and normal free T4, in the setting of pneumonia  -- Will need repeat of thyroid function in 6 to 8 weeks.     T2DM/prediabetes   HbA1c 6%   LDL 88    Anxiety  --  reordered her benzo, she requested 0.25 mg QID (vs 0.5 mg BID at home).      Total time spent: Time Spent: Time Spent: 25 minutes  Time spent includes time reviewing chart, face-to-face time, counseling patient/family/caregiver, ordering medications/tests/procedures, communicating with other health care professionals, documenting clinical information in the electronic health record, and coordination of care.      Expected Discharge Location and Transportation: home tomorrow vs ? rehab  Expected Discharge   Expected Discharge Date: 1/16/2025; Expected Discharge Time:      VTE Prophylaxis:  Pharmacologic & mechanical VTE prophylaxis orders are present.         AM-PAC 6 Clicks Score (PT): 20 (01/15/25 0800)    CODE STATUS:   Code Status and Medical Interventions: CPR (Attempt to Resuscitate); Full Support   Ordered at: 01/09/25 2009     Level Of Support Discussed With:    Patient     Code Status (Patient has no pulse and is not breathing):    CPR (Attempt to Resuscitate)     Medical Interventions (Patient has pulse or is breathing):     Full Support       Franchesca Stewart MD  01/15/25       Electronically signed by Franchesca Stewart MD at 01/15/25 1228       Az Wasserman MD at 01/15/25 0823              INFECTIOUS DISEASE Progress Note    Kristen Jean  1960  [id number]    Admission Date: 1/9/2025      Requesting Provider: Rosi Medina MD  Evaluating Physician: Az Wasserman MD    Reason for Consultation: Multifocal pneumonia with h/o MAC on chronic therapy    History of present illness:     1/10/2025:Patient is a 64 y.o. female with pulmonary Mycobacterium avium infection 2015/on chronic therapy, ES COPD/3L O2 baseline, and protein calorie malnutrition who presented to [facility] ED on 1/9/25 with worsening shortness of breath with right pleuritic chest pain, and higher oxygen requirements over the last week prior to admission. About 2 days ago, the patient bent over and she felt like she tore something in her right lower chest.  She quit smoking 11/2024, but all her neighbors smoke in the apartment complex.  The past week, she has needed 5-6 L/min O2 and is still satting at 93% on this oxygen setting.  She follow pulmonology and ID at [facility].  She has been noncompliant with her antibiotics for BONI and rotating taking Levaquin, Azithromycin and Rifampin because of complaints of side effects.   She denies fever, chills, nausea, vomiting, diarrhea, or dysuria.  She has remained afebrile with initial tachycardia. Initial labs were , PCT 0.09, WBC 16,700 with 77% neutrophils, Na 127, K 3.4, creatinine 0.31, CRP 13.13, and CPK 52.  A respiratory panel PCR was negative.  Urinary antigens for Strep pneumo and Legionella were negative. Nasal MRSA PCR is negative. A CT scan of chest without contrast showed right lung multifocal pneumonia, decrease in size of RLL pulmonary nodule.  She is currently on Merrem and Zyvox.  ID was asked to evaluate and manage her antibiotic therapy.    She has been noncompliant with  follow-up.  Her last ID visit at  was in 5/24.  She has been noncompliant with her azithromycin, rifampin, and Levaquin as she takes these medications intermittently.  She states that she has not received an RSV vaccine, Prevnar 20 pneumococcal vaccine, influenza vaccine, or COVID-19 vaccine booster     1/11/2025:  She has remained afebrile.  White blood cell count is 13.9.   MRSA nasal PCR was negative.  Urinary Legionella and pneumococcal antigens were negative.  O2 saturation is 90% on 3 L.    He complains of pleuritic right chest pain.  She complains of dyspnea.  She has minimal sputum production.   she refused taking the doxycycline due to prior nausea on doxycycline.  She is agreeable to trying minocycline.  She will need to take the minocycline with food but not any calcium containing food within 1 to 2 hours of the capsules.  I discussed this issue with her today in detail     1/12/2025: She remains afebrile. White blood cell count is 11.6.   She feels better today.  She has decreased right pleuritic chest pain.  She denies increased cough and sputum production.  She denies nausea vomiting. She denies increased dyspnea.  She is tolerating the minocycline with no nausea.     1/13/2025:   She remains afebrile.  White blood cell count is 12.9.  Creatinine is 0.26.  Sodium is 131.  She is on 3 L of oxygen with an O2 saturation of 94%.   She continues to feel better.  Her right pleuritic chest pain is resolved.  She denies increased dyspnea and cough.  She denies hemoptysis    1/14/2025:  She remains afebrile.  Creatinine is 0.22.  White blood cell count is 17.1.  She is on prednisone at 20 mg/day. .  O2 saturation is 95% on 4 L.   She complains of some residual right pleuritic chest pain.  She has a cough with minimal sputum production.    1/15/24:  She remains afebrile.   White blood cell count is 21.9. and Rocephin dose is currently scheduled for 1800.  She is currently on 5 L of oxygen with an O2 saturation  of 93%.   She is on 20 mg of prednisone per day.  She feels partially improved.  She does not feel ready to go home today and wants to wait until tomorrow.    Past Medical History:   Diagnosis Date    Anxiety disorder 01/09/2025    Asthma-COPD overlap syndrome 01/09/2025    Breast injury     Cachexia 01/09/2025    Chronic bronchitis 08/11/2016    Emphysema of lung 01/09/2025    Hypothyroidism (acquired) 01/09/2025    Mycobacterium infection 08/11/2016    T2DM (type 2 diabetes mellitus) 01/09/2025       Past Surgical History:   Procedure Laterality Date    TOOTH EXTRACTION      1990 and 2002       Family History   Problem Relation Age of Onset    Dementia Father     Breast cancer Neg Hx     Ovarian cancer Neg Hx        Social History     Socioeconomic History    Marital status: Single   Tobacco Use    Smoking status: Every Day   Vaping Use    Vaping status: Never Used   Substance and Sexual Activity    Alcohol use: No    Drug use: Never    Sexual activity: Defer       Allergies   Allergen Reactions    Codeine     Fluticasone-Salmeterol     Penicillins          Medication:    Current Facility-Administered Medications:     !!! Please obtain patients home medications that are stored in central pharmacy (including Breo inhaler in [facility]) and return to patient prior to discharge!, , Not Applicable, BID, Nicole hC, PharmD, Given at 01/10/25 2337    acetaminophen (TYLENOL) tablet 650 mg, 650 mg, Oral, Q4H PRN **OR** acetaminophen (TYLENOL) 160 MG/5ML oral solution 650 mg, 650 mg, Oral, Q4H PRN **OR** acetaminophen (TYLENOL) suppository 650 mg, 650 mg, Rectal, Q4H PRN, Rosi Medina MD    albuterol (PROVENTIL) nebulizer solution 0.083% 2.5 mg/3mL, 2.5 mg, Nebulization, Q4H PRN, Luis Lanier MD, 2.5 mg at 01/15/25 0145    apixaban (ELIQUIS) tablet 5 mg, 5 mg, Oral, Q12H, Wayne Salcedo IV, MD, 5 mg at 01/14/25 2031    benzonatate (TESSALON) capsule 100 mg, 100 mg, Oral, TID PRN, Carol  MD Rosi, 100 mg at 01/14/25 2030    sennosides-docusate (PERICOLACE) 8.6-50 MG per tablet 2 tablet, 2 tablet, Oral, BID PRN **AND** polyethylene glycol (MIRALAX) packet 17 g, 17 g, Oral, Daily PRN **AND** bisacodyl (DULCOLAX) EC tablet 5 mg, 5 mg, Oral, Daily PRN **AND** bisacodyl (DULCOLAX) suppository 10 mg, 10 mg, Rectal, Daily PRN, Rosi Medina MD    Calcium Replacement - Follow Nurse / BPA Driven Protocol, , Not Applicable, PRN, Rosi Medina MD    cefTRIAXone (ROCEPHIN) 2,000 mg in sodium chloride 0.9 % 100 mL MBP, 2,000 mg, Intravenous, Q24H, Anthony Hodges PA, Last Rate: 200 mL/hr at 01/14/25 1758, 2,000 mg at 01/14/25 1758    dilTIAZem CD (CARDIZEM CD) 24 hr capsule 180 mg, 180 mg, Oral, Q24H, Sherry Darden, APRN, 180 mg at 01/14/25 0834    Fluticasone Furoate-Vilanterol (BREO ELLIPTA) 200-25 MCG/ACT inhaler 1 puff **Patient Supplied Med**, 1 puff, Inhalation, Daily - RT, Dorian Sinha, RP, 1 puff at 01/14/25 1954    Halls Cough Drops (lozenges), 1 lozenge, Buccal, Q2H PRN, Marlen Blevins PA-C, 1 lozenge at 01/11/25 1754    Hydrocod Varun-Chlorphe Varun ER (TUSSIONEX PENNKINETIC) 10-8 MG/5ML ER suspension 2.5 mL, 2.5 mL, Oral, Q12H PRN, Marlen Blevins PA-C, 2.5 mL at 01/14/25 1138    ipratropium-albuterol (DUO-NEB) nebulizer solution 3 mL, 3 mL, Nebulization, 4x Daily - RT, Rosi Medina MD, 3 mL at 01/15/25 0744    lactated ringers bolus 500 mL, 500 mL, Intravenous, Once, Rosi Medina MD    LORazepam (ATIVAN) tablet 0.25 mg, 0.25 mg, Oral, Q6H PRN, Franchesca Stewart MD, 0.25 mg at 01/14/25 2030    Magnesium Standard Dose Replacement - Follow Nurse / BPA Driven Protocol, , Not Applicable, PRN, Rosi Medina MD    melatonin tablet 5 mg, 5 mg, Oral, Nightly, Rosi Medina MD    metoprolol tartrate (LOPRESSOR) tablet 25 mg, 25 mg, Oral, Q12H, Wayne Salcedo IV, MD, 25 mg at 01/13/25 2123    minocycline (MINOCIN,DYNACIN) capsule 100 mg, 100 mg,  Oral, Q12H, Az Wasserman MD, 100 mg at 01/14/25 2030    nitroglycerin (NITROSTAT) SL tablet 0.4 mg, 0.4 mg, Sublingual, Q5 Min PRN, Rosi Medina MD    pantoprazole (PROTONIX) EC tablet 40 mg, 40 mg, Oral, Nightly, Rosi Medina MD, 40 mg at 01/14/25 2030    Pharmacy Consult, , Not Applicable, Continuous PRN, Luis Lanier MD    Pharmacy Meds to Bed Consult, , Not Applicable, Daily, Radha Carballo, NUNU    Phosphorus Replacement - Follow Nurse / BPA Driven Protocol, , Not Applicable, Carol ENNIS Muhammad, MD    Potassium Replacement - Follow Nurse / BPA Driven Protocol, , Not Applicable, PRN, Rosi Medina MD    predniSONE (DELTASONE) tablet 20 mg, 20 mg, Oral, Daily With Breakfast, Luis Lanier MD, 20 mg at 01/14/25 0834    sodium chloride 0.9 % flush 10 mL, 10 mL, Intravenous, PRN, Rosi Medina MD    sodium chloride 0.9 % flush 10 mL, 10 mL, Intravenous, Q12H, Rosi Medina MD, 10 mL at 01/14/25 2031    sodium chloride 0.9 % flush 10 mL, 10 mL, Intravenous, PRN, Rosi Medina MD    sodium chloride 0.9 % infusion 40 mL, 40 mL, Intravenous, PRN, Rosi Medina MD    Antibiotics:  Anti-Infectives (From admission, onward)      Ordered     Dose/Rate Route Frequency Start Stop    01/11/25 1011  minocycline (MINOCIN,DYNACIN) capsule 100 mg        Ordering Provider: Az Wasserman MD    100 mg Oral Every 12 Hours Scheduled 01/11/25 1100 01/21/25 0859    01/10/25 1534  doxycycline (MONODOX) capsule 100 mg  Status:  Discontinued        Ordering Provider: Anthony Hodges PA    100 mg Oral Every 12 Hours Scheduled 01/10/25 2100 01/11/25 1010    01/10/25 1534  cefTRIAXone (ROCEPHIN) 2,000 mg in sodium chloride 0.9 % 100 mL MBP        Ordering Provider: Hodges, Anthony, PA    2,000 mg  200 mL/hr over 30 Minutes Intravenous Every 24 Hours 01/10/25 1800 01/24/25 1759    01/09/25 2014  meropenem (MERREM) 1,000 mg in sodium chloride 0.9 % 100 mL MBP  Status:  Discontinued         Ordering Provider: Rosi Medina MD    1,000 mg  over 3 Hours Intravenous Every 8 Hours 01/10/25 0600 01/10/25 1534    25  Linezolid (ZYVOX) 600 mg 300 mL  Status:  Discontinued        Ordering Provider: Rosi Medina MD    600 mg  300 mL/hr over 60 Minutes Intravenous Every 12 Hours 01/10/25 0500 25 0846    25  meropenem (MERREM) 1,000 mg in sodium chloride 0.9 % 100 mL MBP        Ordering Provider: Rosi Medina MD    1,000 mg  over 30 Minutes Intravenous Once 01/10/25 0000 01/10/25 0153    25 1717  Linezolid (ZYVOX) 600 mg 300 mL        Ordering Provider: Ezequiel Eaton DO    600 mg  300 mL/hr over 60 Minutes Intravenous Once 25 1733 25 1843    25 1717  cefepime 2000 mg IVPB in 100 mL NS (MBP)        Ordering Provider: Ezequiel Eaton DO    2,000 mg  over 30 Minutes Intravenous Once 25 1733 25 1817              Review of Systems:  See HPI      Physical Exam:   Vital Signs  Temp (24hrs), Av.4 °F (36.9 °C), Min:97.8 °F (36.6 °C), Max:99.2 °F (37.3 °C)    Temp  Min: 97.8 °F (36.6 °C)  Max: 99.2 °F (37.3 °C)  BP  Min: 102/48  Max: 143/51  Pulse  Min: 77  Max: 118  Resp  Min: 16  Max: 20  SpO2  Min: 88 %  Max: 94 %    GENERAL: Ill and cachectic appearing.  HEENT: Normocephalic, atraumatic.  PERRL. EOMI. No conjunctival injection. No icterus. Oropharynx clear without evidence of thrush or exudate.    NECK: Supple   HEART: RRR; No murmur, rubs, gallops.   LUNGS:   Bilateral right greater than left crackles  ABDOMEN: Soft, nontender, nondistended. No rebound or guarding. NO mass or HSM.  EXT:  No cyanosis, clubbing or edema. No cord.  :  Without Ocasio catheter.  MSK: No joint effusions or erythema  SKIN: Warm and dry without cutaneous eruptions on Inspection/palpation.    NEURO: Oriented to PPT.  Motor 5/5 strength  PSYCHIATRIC: Normal insight and judgment. Cooperative with PE    Laboratory Data    Results from last 7  days   Lab Units 01/15/25  0417 01/14/25  0427 01/13/25  0344   WBC 10*3/mm3 21.94* 17.07* 12.92*   HEMOGLOBIN g/dL 10.1* 9.8* 9.1*   HEMATOCRIT % 31.4* 30.6* 28.1*   PLATELETS 10*3/mm3 866* 816* 727*     Results from last 7 days   Lab Units 01/15/25  0417   SODIUM mmol/L 131*   POTASSIUM mmol/L 4.2   CHLORIDE mmol/L 93*   CO2 mmol/L 33.0*   BUN mg/dL 5*   CREATININE mg/dL 0.26*   GLUCOSE mg/dL 105*   CALCIUM mg/dL 9.2     Results from last 7 days   Lab Units 01/11/25  1842   ALK PHOS U/L 434*   BILIRUBIN mg/dL <0.2   ALT (SGPT) U/L 51*   AST (SGOT) U/L 101*     Results from last 7 days   Lab Units 01/09/25  1622   SED RATE mm/hr 113*     Results from last 7 days   Lab Units 01/09/25  1807   CRP mg/dL 13.13*         Results from last 7 days   Lab Units 01/09/25  1807   CK TOTAL U/L 52         Estimated Creatinine Clearance: 163.3 mL/min (A) (by C-G formula based on SCr of 0.26 mg/dL (L)).      Microbiology:  Microbiology Results (last 10 days)       Procedure Component Value - Date/Time    MRSA Screen, PCR (Inpatient) - Swab, Nares [[id number]]  (Normal) Collected: 01/10/25 1435    Lab Status: Final result Specimen: Swab from Nares Updated: 01/10/25 1607     MRSA PCR Negative    Narrative:      The negative predictive value of this diagnostic test is high and should only be used to consider de-escalating anti-MRSA therapy. A positive result may indicate colonization with MRSA and must be correlated clinically.  MRSA Negative    S. Pneumo Ag Urine or CSF - Urine, Urine, Clean Catch [[id number]]  (Normal) Collected: 01/10/25 0729    Lab Status: Final result Specimen: Urine, Clean Catch Updated: 01/10/25 1353     Strep Pneumo Ag Negative    Legionella Antigen, Urine - Urine, Urine, Clean Catch [[id number]]  (Normal) Collected: 01/10/25 0729    Lab Status: Final result Specimen: Urine, Clean Catch Updated: 01/10/25 1353     LEGIONELLA ANTIGEN, URINE Negative    Respiratory Panel PCR w/COVID-19(SARS-CoV-2)  MARIANELA/BRONWYN/LUZ MARINA/PAD/COR/KINGS In-House, NP Swab in UTM/VTM, 2 HR TAT - Swab, Nasopharynx [[id number]]  (Normal) Collected: 01/10/25 0133    Lab Status: Final result Specimen: Swab from Nasopharynx Updated: 01/10/25 0238     ADENOVIRUS, PCR Not Detected     Coronavirus 229E Not Detected     Coronavirus HKU1 Not Detected     Coronavirus NL63 Not Detected     Coronavirus OC43 Not Detected     COVID19 Not Detected     Human Metapneumovirus Not Detected     Human Rhinovirus/Enterovirus Not Detected     Influenza A PCR Not Detected     Influenza B PCR Not Detected     Parainfluenza Virus 1 Not Detected     Parainfluenza Virus 2 Not Detected     Parainfluenza Virus 3 Not Detected     Parainfluenza Virus 4 Not Detected     RSV, PCR Not Detected     Bordetella pertussis pcr Not Detected     Bordetella parapertussis PCR Not Detected     Chlamydophila pneumoniae PCR Not Detected     Mycoplasma pneumo by PCR Not Detected    Narrative:      In the setting of a positive respiratory panel with a viral infection PLUS a negative procalcitonin without other underlying concern for bacterial infection, consider observing off antibiotics or discontinuation of antibiotics and continue supportive care. If the respiratory panel is positive for atypical bacterial infection (Bordetella pertussis, Chlamydophila pneumoniae, or Mycoplasma pneumoniae), consider antibiotic de-escalation to target atypical bacterial infection.                  Radiology:  Imaging Results (Last 72 Hours)       ** No results found for the last 72 hours. **         I read her radiographic images.      Impression:   Multifocal pneumonia- this is likely a bacterial pneumonia superimposed on her BONI.  I will leave her on intravenous Rocephin and oral minocycline.  Mycobacterium avium complex pulmonary infection -since 2016,  with therapeutic noncompliance.  She has been rotating Levaquin, azithromycin, and rifampin d/t side effects.  She is not compliant with ID follow up. She  is at high risk for developing a resistant BONI strain that will make it difficult to treat in the future.   Leukocytosis/neutrophilia-this is partially secondary to her steroid therapy.  End stage chronic obstructive pulmonary disease/on 3L O2 baseline at home  Protein calorie malnutrition  Penicillin list allergy.  Seems to tolerate cephalosporins.  Medical noncompliance-this complicates all aspects of her care and increases her risk for poor outcome.     Hyponatremia-mild    PLAN/RECOMMENDATIONS:   Rocephin 2 gm IV daily-move the dosing time from 1800 to 1000  Minocycline 100 mg p.o. twice daily  Continue O2 support  Prevnar 20 pneumococcal vaccination, influenza vaccination, RSV vaccination, and COVID-19 vaccine booster in the near future  Infectious disease follow-up at -I have advised her to be compliant with her follow-up appointments    I discussed her clinical situation and disposition with Dr. Stewart today.      Az Wasserman MD  1/15/2025  08:23 EST                  Electronically signed by Az Wasserman MD at 01/15/25 1517       Franchesca Stewart MD at 25 1221              [facility] Medicine Services  PROGRESS NOTE    Patient Name: Kristen Jean  : 1960  MRN: [id number]    Date of Admission: 2025  Primary Care Physician: Moiz Nova MD    Subjective   Subjective     CC:  SOA    HPI:  Complaining of R sided pain with deep breaths. States that it had resolved but is now back.     Objective   Objective     Vital Signs:   Temp:  [97.6 °F (36.4 °C)-98.4 °F (36.9 °C)] 98 °F (36.7 °C)  Heart Rate:  [] 109  Resp:  [16-18] 18  BP: (102-143)/(48-62) 143/51  Flow (L/min) (Oxygen Therapy):  [3-4] 4     Physical Exam:  Constitutional: No acute distress, awake, alert, cachetic appearing WF in NAD  HENT: NCAT, mucous membranes moist  Respiratory: diffuse rhonchi bilaterally   Cardiovascular: RRR,  no murmurs, rubs, or gallops  Gastrointestinal:  Positive bowel sounds, soft, nontender, nondistended  Musculoskeletal: No bilateral ankle edema  Psychiatric: Appropriate affect, cooperative  Neurologic: Oriented x 3, strength symmetric in all extremities, Cranial Nerves grossly intact to confrontation, speech clear  Skin: No rashes      Results Reviewed:  LAB RESULTS:      Lab 01/14/25 0427 01/13/25 0344 01/12/25  0554 01/11/25  2135 01/11/25  1859 01/11/25  1842 01/11/25  0408 01/09/25  1807 01/09/25  1622   WBC 17.07* 12.92* 11.64*  --  15.32*  --  13.89*  --  16.67*   HEMOGLOBIN 9.8* 9.1* 9.4*  --  10.4*  --  9.6*  --  10.1*   HEMATOCRIT 30.6* 28.1* 28.8*  --  32.1*  --  29.4*  --  30.4*   PLATELETS 816* 727* 648*  --  687*  --  509*  --  493*   NEUTROS ABS 12.23* 8.84* 7.69*  --   --   --  9.68*  --  12.88*   IMMATURE GRANS (ABS) 0.30* 0.21* 0.17*  --   --   --  0.15*  --  0.09*   LYMPHS ABS 2.28 1.95 1.76  --   --   --  1.60  --  1.74   MONOS ABS 1.32* 1.18* 1.16*  --   --   --  1.41*  --  1.50*   EOS ABS 0.88* 0.69* 0.80*  --   --   --  0.99*  --  0.42*   MCV 82.5 81.9 81.8  --  82.5  --  81.2  --  79.8   SED RATE  --   --   --   --   --   --   --   --  113*   CRP  --   --   --   --   --   --   --  13.13*  --    PROCALCITONIN  --   --   --   --   --   --   --  0.09  --    HSTROP T  --   --   --  14*  --  12  --  12 11         Lab 01/14/25 0427 01/13/25  0344 01/12/25  0555 01/11/25  1842 01/11/25  0408 01/09/25  1807 01/09/25  1622   SODIUM 130* 131* 134* 132* 129*  --  127*   POTASSIUM 4.1 4.0 4.2 4.6  4.6 3.1*  --  3.4*   CHLORIDE 93* 95* 98 97* 93*  --  90*   CO2 31.0* 30.0* 29.0 25.0 27.0  --  27.0   ANION GAP 6.0 6.0 7.0 10.0 9.0  --  10.0   BUN 4* 4* 3* 3* 3*  --  5*   CREATININE 0.22* 0.26* 0.19* 0.26* 0.24*  --  0.31*   EGFR 128.6 123.6 133.3 123.6 126.0  --  118.4   GLUCOSE 93 98 99 115* 116*  --  131*   CALCIUM 9.1 8.5* 8.8 8.8 8.6  --  9.1   MAGNESIUM 2.1 2.0 2.2 1.8 1.8  --   --    PHOSPHORUS  --   --  3.6 1.8*  --   --   --    HEMOGLOBIN  A1C  --   --   --   --   --   --  6.00*   TSH  --   --   --   --   --  0.160*  --          Lab 01/11/25  1842 01/09/25  1622   TOTAL PROTEIN 5.9* 6.1   ALBUMIN 3.0* 3.2*   GLOBULIN 2.9 2.9   ALT (SGPT) 51* 21   AST (SGOT) 101* 29   BILIRUBIN <0.2 0.3   ALK PHOS 434* 201*   LIPASE  --  18         Lab 01/11/25  2135 01/11/25  1842 01/09/25  1807 01/09/25  1622   PROBNP  --   --   --  264.7   HSTROP T 14* 12 12 11         Lab 01/09/25  1807   CHOLESTEROL 144   LDL CHOL 88   HDL CHOL 41   TRIGLYCERIDES 74             Brief Urine Lab Results  (Last result in the past 365 days)        Color   Clarity   Blood   Leuk Est   Nitrite   Protein   CREAT   Urine HCG        01/10/25 0730 Yellow   Clear   Negative   Negative   Negative   Negative                   Microbiology Results Abnormal       None            No radiology results from the last 24 hrs    Results for orders placed during the hospital encounter of 01/09/25    Adult Transthoracic Echo Complete W/ Cont if Necessary Per Protocol    Interpretation Summary    Left ventricular systolic function is hyperdynamic (EF > 70%).    No hemodynamically significant valvular disease present.    The right ventricular cavity is mildly dilated.    Estimated right ventricular systolic pressure from tricuspid regurgitation is normal (<35 mmHg).    There is a left pleural effusion.      Current medications:  Scheduled Meds:Pharmacy Consult, , Not Applicable, BID  apixaban, 5 mg, Oral, Q12H  cefTRIAXone, 2,000 mg, Intravenous, Q24H  dilTIAZem CD, 180 mg, Oral, Q24H  Fluticasone Furoate-Vilanterol, 1 puff, Inhalation, Daily - RT  ipratropium-albuterol, 3 mL, Nebulization, 4x Daily - RT  lactated ringers, 500 mL, Intravenous, Once  melatonin, 5 mg, Oral, Nightly  metoprolol tartrate, 25 mg, Oral, Q12H  minocycline, 100 mg, Oral, Q12H  pantoprazole, 40 mg, Oral, Nightly  Pharmacy Meds to Bed Consult, , Not Applicable, Daily  predniSONE, 20 mg, Oral, Daily With Breakfast  sodium chloride,  10 mL, Intravenous, Q12H      Continuous Infusions:Pharmacy Consult,       PRN Meds:.  acetaminophen **OR** acetaminophen **OR** acetaminophen    Albuterol Sulfate NEB Orderable    benzonatate    senna-docusate sodium **AND** polyethylene glycol **AND** bisacodyl **AND** bisacodyl    Calcium Replacement - Follow Nurse / BPA Driven Protocol    Halls Cough Drops    Hydrocod Varun-Chlorphe Varun ER    LORazepam    Magnesium Standard Dose Replacement - Follow Nurse / BPA Driven Protocol    nitroglycerin    Pharmacy Consult    Phosphorus Replacement - Follow Nurse / BPA Driven Protocol    Potassium Replacement - Follow Nurse / BPA Driven Protocol    sodium chloride    sodium chloride    sodium chloride    Assessment & Plan   Assessment & Plan     Active Hospital Problems    Diagnosis  POA    **Multifocal pneumonia [J18.9]  Yes    Atrial tachycardia [I47.19]  No    Severe protein-calorie malnutrition [E43]  Yes    Atrial fibrillation/flutter [I48.91]  Yes    Heart failure with improved ejection fraction (HFimpEF) [I50.32]  Yes    Aortic atherosclerosis [I70.0]  Yes    Emphysema of lung [J43.9]  Yes    Asthma-COPD overlap syndrome [J44.89]  Yes    Anxiety disorder [F41.9]  Yes    Hyponatremia [E87.1]  Yes    Hypokalemia [E87.6]  Yes    Protein calorie malnutrition [E46]  Yes    Cachexia [R64]  Yes    End stage COPD [J44.9]  Yes    Hypothyroidism (acquired) [E03.9]  Yes    Mycobacterium avium complex [A31.0]  Yes    Chronic bronchitis [J42]  Yes    Mycobacterium infection [A31.9]  Yes      Resolved Hospital Problems    Diagnosis Date Resolved POA    Pneumonia [J18.9] 01/11/2025 Yes    T2DM (type 2 diabetes mellitus) [E11.9] 01/11/2025 Yes        Brief Hospital Course to date:  Kristen Jean is a 64 y.o. female  with a PMH significant for pulmonary Mycobacterium avium complex diagnosed 2015, end-stage COPD, COPD/asthma overlap syndrome, advanced emphysema, anxiety and protein calorie malnutrition with COPD cachexia who presented  to [facility] ED secondary to worsening shortness of breath, higher oxygen requirements, right-sided pleuritic chest pain, pain in lower chest on the right side and was found to have multifocal PNA.    This patient's problems and plans were partially entered by my partner and updated as appropriate by me 01/14/25.    Plan:     Pneumonia  Multifocal pneumonia  Mycobacterium avium complex  End-stage COPD  Asthma - COPD overlap syndrome  Emphysema of the lung  Chronic bronchitis  Patient has a history that is significant for advanced COPD with a history of asthma - COPD overlap syndrome, follows [facility] pulmonology  - Does have a history of Mycobacterium AVM complex, appears on CT that her previous RLL nodule has decreased in size  - CT chest without contrast revealed multifocal pneumonia more pronounced RML/RLL  - MRSA swab, streptococcal Legionella urine antigens negative, respiratory culture PENDING   -- respiratory viral PCR panel negative  - Consulted ID,  will require guidance on antimicrobial regimen and duration. Appreciate Dr. Wasserman's assistance. Continue Rocephin/Minocycline per his recs. Plan for solely Minocycline upon d/c   - Consulted pulmonology due to extent of pulmonary disease  - DuoNebs, Tessalon Perles and albuterol inhaler  - Continue nasal cannula oxygen and wean as tolerated     Hyponatremia  Hypokalemia  Suspect SIADH in the setting of pneumonia versus hypovolemic hyponatremia  - s/p IVFs  -- Na+ stable  - Potassium replacement protocol in place    New Onset Afib with RVR  ?SVT  -- started dilt gtt, continue as BP allows  -- consult Cardiology given new diagnosis, appreciate their assistance  -- started on Eliquis, [id number]  -- given IV digoxin but remained tachycardic   -- EKG repeated and concern for SVT   -- pt refused IV Adenosine initially, however, eventually agreed. Briefly HR was better, but then went back to 160s.  Resumed dilt gtt at that point.   -- now  off dilt gtt and in NSR  -- continue with Metoprolol and PO Diltiazem as per Cardiology  -- TTE with normal EF      Protein calorie malnutrition  Cachexia of end-stage lung disease  Patient has low albumin, appears cachectic likely secondary to advanced lung disease  - Nutrition consult for assessment of malnutrition     Goals of care  -- pt still full code for time being.  Will continue to discuss with her given her poor overall prognosis      Low TSH  -- Likely subclinical thyroiditis with low TSH and normal free T4, in the setting of pneumonia  -- Will need repeat of thyroid function in 6 to 8 weeks.     T2DM/prediabetes   HbA1c 6%   LDL 88    Anxiety  --  reordered her benzo, she requested 0.25 mg QID (vs 0.5 mg BID at home).      Total time spent: Time Spent: Time Spent: 25 minutes  Time spent includes time reviewing chart, face-to-face time, counseling patient/family/caregiver, ordering medications/tests/procedures, communicating with other health care professionals, documenting clinical information in the electronic health record, and coordination of care.      Expected Discharge Location and Transportation: home tomorrow  Expected Discharge   Expected Discharge Date: 1/15/2025; Expected Discharge Time:      VTE Prophylaxis:  Pharmacologic & mechanical VTE prophylaxis orders are present.         AM-PAC 6 Clicks Score (PT): 20 (01/13/25 2030)    CODE STATUS:   Code Status and Medical Interventions: CPR (Attempt to Resuscitate); Full Support   Ordered at: 01/09/25 2009     Level Of Support Discussed With:    Patient     Code Status (Patient has no pulse and is not breathing):    CPR (Attempt to Resuscitate)     Medical Interventions (Patient has pulse or is breathing):    Full Support       Franchesca Stewart MD  01/14/25       Electronically signed by Franchesca Stewart MD at 01/14/25 5373       Radha Carballo APRN at 01/14/25 8587          Cardiology Progress Note      Reason for visit:    Atrial  flutter/fibrillation  SVT  Systolic heart failure with improved LVEF    IDENTIFICATION: 64-year-old female who resides in [place], [place]    Active Hospital Problems    Diagnosis  POA    **Multifocal pneumonia [J18.9]  Yes     Priority: High    Atrial tachycardia [I47.19]  No     Priority: High     Episode of SVT 1/11/2025 in the setting of multifocal pneumonia.  IV adenosine recommended but patient refused.  Converted spontaneously      Atrial fibrillation/flutter [I48.91]  Yes     Priority: High     First time diagnosis in the setting of multifocal pneumonia, 1/11/2025  [id number] 3 (female, history of CHF, aortic athero)  Echo (1/12/2025): LVEF >70%.  No significant valve abnormality      Emphysema of lung [J43.9]  Yes     Priority: High    End stage COPD [J44.9]  Yes     Priority: High    Mycobacterium avium complex [A31.0]  Yes     Priority: High    Heart failure with improved ejection fraction (HFimpEF) [I50.32]  Yes     Priority: Medium     Echo at  (1/18/2024): LVEF 38% with wall motion abnormality consistent with Takotsubo (stress) cardiomyopathy  Reported normalization of LV systolic function on echo performed by Miquel Post at [facility]  Echo (1/12/2025): LVEF >70%.  No significant valve abnormality      Aortic atherosclerosis [I70.0]  Yes     Priority: Medium     Aortic atherosclerosis noted on chest CT, 1/2025      Chronic bronchitis [J42]  Yes     Priority: Medium    Asthma-COPD overlap syndrome [J44.89]  Yes     Priority: Low    Anxiety disorder [F41.9]  Yes     Priority: Low    Hyponatremia [E87.1]  Yes     Priority: Low    Hypokalemia [E87.6]  Yes     Priority: Low    Cachexia [R64]  Yes     Priority: Low    Hypothyroidism (acquired) [E03.9]  Yes     Priority: Low    Severe protein-calorie malnutrition [E43]  Yes    Protein calorie malnutrition [E46]  Yes    Mycobacterium infection [A31.9]  Yes       Subjective     No further episodes of atrial fibrillation/flutter or  SVT.  The patient sitting up in the bed without complaints.  She is in normal sinus rhythm on telemetry.  She is on O2 at 4 L by nasal cannula        Objective   Vital Sign Min/Max for last 24 hours  Temp  Min: 97.6 °F (36.4 °C)  Max: 98.8 °F (37.1 °C)   BP  Min: 104/49  Max: 147/56   Pulse  Min: 73  Max: 111   Resp  Min: 16  Max: 20   SpO2  Min: 89 %  Max: 96 %   Flow (L/min) (Oxygen Therapy)  Min: 3  Max: 4      Intake/Output Summary (Last 24 hours) at 1/14/2025 0738  Last data filed at 1/14/2025 0500  Gross per 24 hour   Intake 360 ml   Output 500 ml   Net -140 ml           Physical Exam  Constitutional:       Appearance: She is well-developed.   HENT:      Head: Normocephalic.   Neck:      Vascular: No carotid bruit or JVD.   Cardiovascular:      Rate and Rhythm: Normal rate and regular rhythm.      Heart sounds: Normal heart sounds. No murmur heard.     No friction rub. No gallop.   Pulmonary:      Effort: Pulmonary effort is normal.      Breath sounds: Decreased breath sounds present.   Abdominal:      Palpations: Abdomen is soft.   Skin:     General: Skin is warm and dry.      Nails: There is no clubbing.   Neurological:      Mental Status: She is alert and oriented to person, place, and time.   Psychiatric:         Behavior: Behavior normal.         Tele: Normal sinus rhythm     Results Review (reviewed the patient's recent labs in the electronic medical record):      EKG (1/12/2025): Normal sinus rhythm with sinus arrhythmia     CT of the chest (1/9/2025): Right lung multifocal pneumonia.  Decreased right lower lobe pulmonary nodule.     Echo (1/12/2025): LVEF >70%.  No significant valvular abnormality       Results from last 7 days   Lab Units 01/14/25  0427 01/13/25  0344 01/12/25  0555 01/11/25  1842 01/11/25  0408 01/09/25  1622 01/09/25  1622   SODIUM mmol/L 130* 131* 134* 132* 129*  --  127*   POTASSIUM mmol/L 4.1 4.0 4.2 4.6  4.6 3.1*  --  3.4*   CHLORIDE mmol/L 93* 95* 98 97* 93*  --  90*   BUN  mg/dL 4* 4* 3* 3* 3*  --  5*   CREATININE mg/dL 0.22* 0.26* 0.19* 0.26* 0.24*  --  0.31*   MAGNESIUM mg/dL 2.1 2.0 2.2 1.8 1.8   < >  --     < > = values in this interval not displayed.       Results from last 7 days   Lab Units 01/11/25  2135 01/11/25  1842 01/09/25  1807   HSTROP T ng/L 14* 12 12       Results from last 7 days   Lab Units 01/14/25  0427 01/13/25  0344 01/12/25  0554   WBC 10*3/mm3 17.07* 12.92* 11.64*   HEMOGLOBIN g/dL 9.8* 9.1* 9.4*   HEMATOCRIT % 30.6* 28.1* 28.8*   PLATELETS 10*3/mm3 816* 727* 648*       Lab Results   Component Value Date    HGBA1C 6.00 (H) 01/09/2025       Lab Results   Component Value Date    CHOL 144 01/09/2025    TRIG 74 01/09/2025    HDL 41 01/09/2025    LDL 88 01/09/2025             Assessment     Atrial fibrillation/flutter/atrial tachycardia SVT  First time diagnosis in the setting of pneumonia  Noted on apixaban this admission  Replete potassium >4 and magnesium >2  Eliquis 5 mg twice daily for stroke prophylaxis  Diltiazem 180 mg daily  Metoprolol tartrate 25 mg twice daily     History of stress-induced cardiomyopathy  History of LVEF 38% in the setting of pneumonia 1/2024  Reported normalization of LV systolic function on subsequent echo at [facility]  Repeat echo is pending  proBNP and chest CT do not suggest heart failure observation.        Plan     Continue Eliquis for stroke prophylaxis  Home on diltiazem and metoprolol  Patient to follow-up with primary cardiologist in 4 weeks  Please call cardiology with any further questions we will sign    Electronically signed by NUNU Aguilera, 01/14/25, 7:38 AM EST.    Electronically signed by Radha Carballo APRN at 01/14/25 0818       Az Wasserman MD at 01/14/25 0731              INFECTIOUS DISEASE Progress Note    Kristen Jean  1960  [id number]    Admission Date: 1/9/2025      Requesting Provider: Rosi Medina MD  Evaluating Physician: Az Wasserman MD    Reason for  Consultation: Multifocal pneumonia with h/o MAC on chronic therapy    History of present illness:     1/10/2025:Patient is a 64 y.o. female with pulmonary Mycobacterium avium infection 2015/on chronic therapy, ES COPD/3L O2 baseline, and protein calorie malnutrition who presented to [facility] ED on 1/9/25 with worsening shortness of breath with right pleuritic chest pain, and higher oxygen requirements over the last week prior to admission. About 2 days ago, the patient bent over and she felt like she tore something in her right lower chest.  She quit smoking 11/2024, but all her neighbors smoke in the apartment complex.  The past week, she has needed 5-6 L/min O2 and is still satting at 93% on this oxygen setting.  She follow pulmonology and ID at [facility].  She has been noncompliant with her antibiotics for BONI and rotating taking Levaquin, Azithromycin and Rifampin because of complaints of side effects.   She denies fever, chills, nausea, vomiting, diarrhea, or dysuria.  She has remained afebrile with initial tachycardia. Initial labs were , PCT 0.09, WBC 16,700 with 77% neutrophils, Na 127, K 3.4, creatinine 0.31, CRP 13.13, and CPK 52.  A respiratory panel PCR was negative.  Urinary antigens for Strep pneumo and Legionella were negative. Nasal MRSA PCR is negative. A CT scan of chest without contrast showed right lung multifocal pneumonia, decrease in size of RLL pulmonary nodule.  She is currently on Merrem and Zyvox.  ID was asked to evaluate and manage her antibiotic therapy.    She has been noncompliant with follow-up.  Her last ID visit at  was in 5/24.  She has been noncompliant with her azithromycin, rifampin, and Levaquin as she takes these medications intermittently.  She states that she has not received an RSV vaccine, Prevnar 20 pneumococcal vaccine, influenza vaccine, or COVID-19 vaccine booster     1/11/2025:  She has remained afebrile.  White blood cell count is 13.9.   MRSA nasal PCR was  negative.  Urinary Legionella and pneumococcal antigens were negative.  O2 saturation is 90% on 3 L.    He complains of pleuritic right chest pain.  She complains of dyspnea.  She has minimal sputum production.   she refused taking the doxycycline due to prior nausea on doxycycline.  She is agreeable to trying minocycline.  She will need to take the minocycline with food but not any calcium containing food within 1 to 2 hours of the capsules.  I discussed this issue with her today in detail     1/12/2025: She remains afebrile. White blood cell count is 11.6.   She feels better today.  She has decreased right pleuritic chest pain.  She denies increased cough and sputum production.  She denies nausea vomiting. She denies increased dyspnea.  She is tolerating the minocycline with no nausea.     1/13/2025:   She remains afebrile.  White blood cell count is 12.9.  Creatinine is 0.26.  Sodium is 131.  She is on 3 L of oxygen with an O2 saturation of 94%.   She continues to feel better.  Her right pleuritic chest pain is resolved.  She denies increased dyspnea and cough.  She denies hemoptysis    1/14/2025:  She remains afebrile.  Creatinine is 0.22.  White blood cell count is 17.1.  She is on prednisone at 20 mg/day. .  O2 saturation is 95% on 4 L.   She complains of some residual right pleuritic chest pain.  She has a cough with minimal sputum production.    Past Medical History:   Diagnosis Date    Anxiety disorder 01/09/2025    Asthma-COPD overlap syndrome 01/09/2025    Breast injury     Cachexia 01/09/2025    Chronic bronchitis 08/11/2016    Emphysema of lung 01/09/2025    Hypothyroidism (acquired) 01/09/2025    Mycobacterium infection 08/11/2016    T2DM (type 2 diabetes mellitus) 01/09/2025       Past Surgical History:   Procedure Laterality Date    TOOTH EXTRACTION      1990 and 2002       Family History   Problem Relation Age of Onset    Dementia Father     Breast cancer Neg Hx     Ovarian cancer Neg Hx         Social History     Socioeconomic History    Marital status: Single   Tobacco Use    Smoking status: Every Day   Vaping Use    Vaping status: Never Used   Substance and Sexual Activity    Alcohol use: No    Drug use: Never    Sexual activity: Defer       Allergies   Allergen Reactions    Codeine     Fluticasone-Salmeterol     Penicillins          Medication:    Current Facility-Administered Medications:     !!! Please obtain patients home medications that are stored in central pharmacy ([facility] and return to patient prior to discharge!, , Not Applicable, BID, Nicole Ch, PharmD, Given at 01/10/25 2337    acetaminophen (TYLENOL) tablet 650 mg, 650 mg, Oral, Q4H PRN **OR** acetaminophen (TYLENOL) 160 MG/5ML oral solution 650 mg, 650 mg, Oral, Q4H PRN **OR** acetaminophen (TYLENOL) suppository 650 mg, 650 mg, Rectal, Q4H PRN, Rosi Medina MD    albuterol (PROVENTIL) nebulizer solution 0.083% 2.5 mg/3mL, 2.5 mg, Nebulization, Q4H PRN, Luis Lanier MD, 2.5 mg at 01/14/25 0526    apixaban (ELIQUIS) tablet 5 mg, 5 mg, Oral, Q12H, Wayne Salcedo IV, MD, 5 mg at 01/13/25 2123    benzonatate (TESSALON) capsule 100 mg, 100 mg, Oral, TID PRN, Rosi Medina MD, 100 mg at 01/13/25 2123    sennosides-docusate (PERICOLACE) 8.6-50 MG per tablet 2 tablet, 2 tablet, Oral, BID PRN **AND** polyethylene glycol (MIRALAX) packet 17 g, 17 g, Oral, Daily PRN **AND** bisacodyl (DULCOLAX) EC tablet 5 mg, 5 mg, Oral, Daily PRN **AND** bisacodyl (DULCOLAX) suppository 10 mg, 10 mg, Rectal, Daily PRN, Rosi Medina MD    Calcium Replacement - Follow Nurse / BPA Driven Protocol, , Not Applicable, PRN, Rosi Medina MD    cefTRIAXone (ROCEPHIN) 2,000 mg in sodium chloride 0.9 % 100 mL MBP, 2,000 mg, Intravenous, Q24H, Anthony Hodges PA, Last Rate: 200 mL/hr at 01/13/25 1816, 2,000 mg at 01/13/25 1816    dilTIAZem CD (CARDIZEM CD) 24 hr capsule 180 mg, 180 mg, Oral, Q24H,  Sherry Darden, APRN, 180 mg at 01/13/25 1219    Fluticasone Furoate-Vilanterol (BREO ELLIPTA) 200-25 MCG/ACT inhaler 1 puff **Patient Supplied Med**, 1 puff, Inhalation, Daily - RT, Dorian Sinha, RP, 1 puff at 01/13/25 2305    Guttenberg Cough Drops (lozenges), 1 lozenge, Buccal, Q2H PRN, Marlen Blevins PA-C, 1 lozenge at 01/11/25 1754    Hydrocod Varun-Chlorphe Varun ER (TUSSIONEX PENNKINETIC) 10-8 MG/5ML ER suspension 2.5 mL, 2.5 mL, Oral, Q12H PRN, Marlen Blevins PA-C, 2.5 mL at 01/11/25 0212    ipratropium-albuterol (DUO-NEB) nebulizer solution 3 mL, 3 mL, Nebulization, 4x Daily - RT, Rosi Medina MD, 3 mL at 01/13/25 2303    lactated ringers bolus 500 mL, 500 mL, Intravenous, Once, Rosi Medina MD    LORazepam (ATIVAN) tablet 0.25 mg, 0.25 mg, Oral, Q6H PRN, Franchesca Stewart MD, 0.25 mg at 01/14/25 0005    Magnesium Standard Dose Replacement - Follow Nurse / BPA Driven Protocol, , Not Applicable, PRN, Rosi Medina MD    melatonin tablet 5 mg, 5 mg, Oral, Nightly, Rosi Medina MD    metoprolol tartrate (LOPRESSOR) tablet 25 mg, 25 mg, Oral, Q12H, Wayne Salcedo IV, MD, 25 mg at 01/13/25 2123    minocycline (MINOCIN,DYNACIN) capsule 100 mg, 100 mg, Oral, Q12H, Az Wasserman MD, 100 mg at 01/13/25 2123    nitroglycerin (NITROSTAT) SL tablet 0.4 mg, 0.4 mg, Sublingual, Q5 Min PRN, Rosi Medina MD    pantoprazole (PROTONIX) EC tablet 40 mg, 40 mg, Oral, Nightly, Rosi Medina MD, 40 mg at 01/13/25 2123    Pharmacy Consult, , Not Applicable, Continuous PRN, Luis Lanier MD    Phosphorus Replacement - Follow Nurse / BPA Driven Protocol, , Not Applicable, PRN, Rosi Medina MD    Potassium Replacement - Follow Nurse / BPA Driven Protocol, , Not Applicable, PRN, Rosi Medina MD    predniSONE (DELTASONE) tablet 20 mg, 20 mg, Oral, Daily With Breakfast, Luis Lanier MD, 20 mg at 01/13/25 0897    sodium chloride 0.9 % flush 10 mL, 10  mL, Intravenous, PRN, Rosi Medina MD    sodium chloride 0.9 % flush 10 mL, 10 mL, Intravenous, Q12H, Rosi Medina MD, 10 mL at 01/13/25 2124    sodium chloride 0.9 % flush 10 mL, 10 mL, Intravenous, PRN, Rosi Medina MD    sodium chloride 0.9 % infusion 40 mL, 40 mL, Intravenous, PRN, Rosi Medina MD    Antibiotics:  Anti-Infectives (From admission, onward)      Ordered     Dose/Rate Route Frequency Start Stop    01/11/25 1011  minocycline (MINOCIN,DYNACIN) capsule 100 mg        Ordering Provider: Az Wasserman MD    100 mg Oral Every 12 Hours Scheduled 01/11/25 1100 01/21/25 0859    01/10/25 1534  doxycycline (MONODOX) capsule 100 mg  Status:  Discontinued        Ordering Provider: Anthony Hodges PA    100 mg Oral Every 12 Hours Scheduled 01/10/25 2100 01/11/25 1010    01/10/25 1534  cefTRIAXone (ROCEPHIN) 2,000 mg in sodium chloride 0.9 % 100 mL MBP        Ordering Provider: Anthony Hodges PA    2,000 mg  200 mL/hr over 30 Minutes Intravenous Every 24 Hours 01/10/25 1800 01/24/25 1759    01/09/25 2014  meropenem (MERREM) 1,000 mg in sodium chloride 0.9 % 100 mL MBP  Status:  Discontinued        Ordering Provider: Rosi Medina MD    1,000 mg  over 3 Hours Intravenous Every 8 Hours 01/10/25 0600 01/10/25 1534    01/09/25 2014  Linezolid (ZYVOX) 600 mg 300 mL  Status:  Discontinued        Ordering Provider: Rosi Medina MD    600 mg  300 mL/hr over 60 Minutes Intravenous Every 12 Hours 01/10/25 0500 01/11/25 0846    01/09/25 2014  meropenem (MERREM) 1,000 mg in sodium chloride 0.9 % 100 mL MBP        Ordering Provider: Rosi Medina MD    1,000 mg  over 30 Minutes Intravenous Once 01/10/25 0000 01/10/25 0153    01/09/25 1717  Linezolid (ZYVOX) 600 mg 300 mL        Ordering Provider: Ezequiel Eaton DO    600 mg  300 mL/hr over 60 Minutes Intravenous Once 01/09/25 1733 01/09/25 1843    01/09/25 1717  cefepime 2000 mg IVPB in 100 mL NS (MBP)        Ordering  Provider: Ezequiel Eaton DO    2,000 mg  over 30 Minutes Intravenous Once 25 1733 25 1817              Review of Systems:  See HPI      Physical Exam:   Vital Signs  Temp (24hrs), Av.2 °F (36.8 °C), Min:97.6 °F (36.4 °C), Max:98.8 °F (37.1 °C)    Temp  Min: 97.6 °F (36.4 °C)  Max: 98.8 °F (37.1 °C)  BP  Min: 104/49  Max: 147/56  Pulse  Min: 73  Max: 111  Resp  Min: 16  Max: 20  SpO2  Min: 89 %  Max: 96 %    GENERAL: Ill and cachectic appearing.  HEENT: Normocephalic, atraumatic.  PERRL. EOMI. No conjunctival injection. No icterus. Oropharynx clear without evidence of thrush or exudate.    NECK: Supple   HEART: RRR; No murmur, rubs, gallops.   LUNGS:   Bilateral right greater than left crackles  ABDOMEN: Soft, nontender, nondistended. No rebound or guarding. NO mass or HSM.  EXT:  No cyanosis, clubbing or edema. No cord.  :  Without Ocasio catheter.  MSK: No joint effusions or erythema  SKIN: Warm and dry without cutaneous eruptions on Inspection/palpation.    NEURO: Oriented to PPT.  Motor 5/5 strength  PSYCHIATRIC: Normal insight and judgment. Cooperative with PE    Laboratory Data    Results from last 7 days   Lab Units 25  0427 25  0344 25  0554   WBC 10*3/mm3 17.07* 12.92* 11.64*   HEMOGLOBIN g/dL 9.8* 9.1* 9.4*   HEMATOCRIT % 30.6* 28.1* 28.8*   PLATELETS 10*3/mm3 816* 727* 648*     Results from last 7 days   Lab Units 25  0427   SODIUM mmol/L 130*   POTASSIUM mmol/L 4.1   CHLORIDE mmol/L 93*   CO2 mmol/L 31.0*   BUN mg/dL 4*   CREATININE mg/dL 0.22*   GLUCOSE mg/dL 93   CALCIUM mg/dL 9.1     Results from last 7 days   Lab Units 25  1842   ALK PHOS U/L 434*   BILIRUBIN mg/dL <0.2   ALT (SGPT) U/L 51*   AST (SGOT) U/L 101*     Results from last 7 days   Lab Units 25  1622   SED RATE mm/hr 113*     Results from last 7 days   Lab Units 25  1807   CRP mg/dL 13.13*         Results from last 7 days   Lab Units 25  1807   CK TOTAL U/L 52          Estimated Creatinine Clearance: 193 mL/min (A) (by C-G formula based on SCr of 0.22 mg/dL (L)).      Microbiology:  Microbiology Results (last 10 days)       Procedure Component Value - Date/Time    MRSA Screen, PCR (Inpatient) - Swab, Nares [[id number]]  (Normal) Collected: 01/10/25 1435    Lab Status: Final result Specimen: Swab from Nares Updated: 01/10/25 1607     MRSA PCR Negative    Narrative:      The negative predictive value of this diagnostic test is high and should only be used to consider de-escalating anti-MRSA therapy. A positive result may indicate colonization with MRSA and must be correlated clinically.  MRSA Negative    S. Pneumo Ag Urine or CSF - Urine, Urine, Clean Catch [[id number]]  (Normal) Collected: 01/10/25 0729    Lab Status: Final result Specimen: Urine, Clean Catch Updated: 01/10/25 1353     Strep Pneumo Ag Negative    Legionella Antigen, Urine - Urine, Urine, Clean Catch [[id number]]  (Normal) Collected: 01/10/25 0729    Lab Status: Final result Specimen: Urine, Clean Catch Updated: 01/10/25 1353     LEGIONELLA ANTIGEN, URINE Negative    Respiratory Panel PCR w/COVID-19(SARS-CoV-2) MARIANELA/BRONWYN/LUZ MARINA/PAD/COR/KINSG In-House, NP Swab in UTM/VTM, 2 HR TAT - Swab, Nasopharynx [[id number]]  (Normal) Collected: 01/10/25 0133    Lab Status: Final result Specimen: Swab from Nasopharynx Updated: 01/10/25 0238     ADENOVIRUS, PCR Not Detected     Coronavirus 229E Not Detected     Coronavirus HKU1 Not Detected     Coronavirus NL63 Not Detected     Coronavirus OC43 Not Detected     COVID19 Not Detected     Human Metapneumovirus Not Detected     Human Rhinovirus/Enterovirus Not Detected     Influenza A PCR Not Detected     Influenza B PCR Not Detected     Parainfluenza Virus 1 Not Detected     Parainfluenza Virus 2 Not Detected     Parainfluenza Virus 3 Not Detected     Parainfluenza Virus 4 Not Detected     RSV, PCR Not Detected     Bordetella pertussis pcr Not Detected     Bordetella parapertussis PCR Not  Detected     Chlamydophila pneumoniae PCR Not Detected     Mycoplasma pneumo by PCR Not Detected    Narrative:      In the setting of a positive respiratory panel with a viral infection PLUS a negative procalcitonin without other underlying concern for bacterial infection, consider observing off antibiotics or discontinuation of antibiotics and continue supportive care. If the respiratory panel is positive for atypical bacterial infection (Bordetella pertussis, Chlamydophila pneumoniae, or Mycoplasma pneumoniae), consider antibiotic de-escalation to target atypical bacterial infection.                  Radiology:  Imaging Results (Last 72 Hours)       ** No results found for the last 72 hours. **         I read her radiographic images.      Impression:   Multifocal pneumonia- this is likely a bacterial pneumonia superimposed on her BONI.  I will leave her on intravenous Rocephin and oral minocycline.  Mycobacterium avium complex pulmonary infection -since 2016,  with therapeutic noncompliance.  She has been rotating Levaquin, azithromycin, and rifampin d/t side effects.  She is not compliant with ID follow up. She is at high risk for developing a resistant BONI strain that will make it difficult to treat in the future.   Leukocytosis/neutrophilia-this is partially secondary to her steroid therapy.  End stage chronic obstructive pulmonary disease/on 3L O2 baseline at home  Protein calorie malnutrition  Penicillin list allergy.  Seems to tolerate cephalosporins.  Medical noncompliance-this complicates all aspects of her care and increases her risk for poor outcome.     Hyponatremia-mild    PLAN/RECOMMENDATIONS:   Rocephin 2 gm IV daily  Minocycline 100 mg p.o. twice daily  Continue O2 support  Prevnar 20 pneumococcal vaccination, influenza vaccination, RSV vaccination, and COVID-19 vaccine booster in the near future  Infectious disease follow-up at -I have advised her to be compliant with her follow-up  appointments        I discussed her complex situation with Dr. Stewart today.  We will tentatively plan on discharge tomorrow on oral minocycline.        Az Wasserman MD  1/14/2025  07:31 EST                  Electronically signed by Az Wasserman MD at 01/14/25 1193       Consult Notes (last 72 hours)  Notes from 01/13/25 1509 through 01/16/25 1509   No notes of this type exist for this encounter.

## 2025-01-16 NOTE — PLAN OF CARE
Problem: Sepsis/Septic Shock  Goal: Absence of Infection Signs and Symptoms  Outcome: Not Progressing  Intervention: Initiate Sepsis Management  Recent Flowsheet Documentation  Taken 1/16/2025 0000 by Soco Avery RN  Infection Prevention: environmental surveillance performed  Taken 1/15/2025 2049 by Soco Avery RN  Infection Prevention:   environmental surveillance performed   rest/sleep promoted   single patient room provided  Intervention: Promote Stabilization  Recent Flowsheet Documentation  Taken 1/15/2025 2049 by Soco Avery RN  Fluid/Electrolyte Management: fluids provided  Intervention: Promote Recovery  Recent Flowsheet Documentation  Taken 1/16/2025 0000 by Soco Avery RN  Activity Management: activity minimized  Taken 1/15/2025 2049 by Soco Avery RN  Activity Management:   up in chair   activity minimized

## 2025-01-16 NOTE — CONSULTS
"          Clinical Nutrition Assessment     Patient Name: Kristen Jean  YOB: 1960  MRN: [id number]  Date of Encounter: 01/16/25 14:51 EST  Admission date: 1/9/2025  Reason for Visit: Follow-up protocol    Assessment   Nutrition Assessment   Admission Diagnosis:  Pneumonia [J18.9]    Problem List:    Multifocal pneumonia    Chronic bronchitis    Mycobacterium infection    Emphysema of lung    Asthma-COPD overlap syndrome    Anxiety disorder    Hyponatremia    Hypokalemia    Protein calorie malnutrition    Cachexia    End stage COPD    Hypothyroidism (acquired)    Mycobacterium avium complex    Severe protein-calorie malnutrition    Atrial fibrillation/flutter    Heart failure with improved ejection fraction (HFimpEF)    Aortic atherosclerosis    Atrial tachycardia      PMH:   She  has a past medical history of Anxiety disorder (01/09/2025), Asthma-COPD overlap syndrome (01/09/2025), Breast injury, Cachexia (01/09/2025), Chronic bronchitis (08/11/2016), Emphysema of lung (01/09/2025), Hypothyroidism (acquired) (01/09/2025), Mycobacterium infection (08/11/2016), and T2DM (type 2 diabetes mellitus) (01/09/2025).    PSH:  She  has a past surgical history that includes Tooth extraction.    Applicable Nutrition History:       Anthropometrics     Height: Height: 167.6 cm (66\")  Last Filed Weight: Weight: 46.7 kg (103 lb) (01/12/25 1138)  Method: Weight Method: Stated  BMI: BMI (Calculated): 16.6    UBW:  ? Limited measured weight data   Weight      Weight (kg) Weight (lbs) Weight Method   4/21/2021 46.267 kg  102 lb     1/9/2025 47 kg  103 lb 9.9 oz  Stated      Weight change:  unable to determine 2/2 limited weight data    Nutrition Focused Physical Exam    Date:  1/10       Patient meets criteria for malnutrition diagnosis, see MSA note.     Subjective   Reported/Observed/Food/Nutrition Related History:   1/16  Pt up in chair, reports increased fatigue. Continues with poor appetite/intake. +BM x3. Denied " N/V. Discussed appetite stimulant - agreeable if not contraindicated.    1/10  Pt up in bed at time of visit, able to provide weight/nutrition hx. Endorsed prolonged anorexia - currently living in hotel, reports staff will bring breakfast daily. Due to SOB and general fatigue, unable to prepare meals. Reports previously receiving freezer meals from insurance company however was unable to get box into house. Denies dysphagia or difficulty chewing with SOB. Suspect primary challenge to PO intake is ability to prepare foods. NKFA    Pt reported would not be interested in nutrition support if unable to improve intake    Current Nutrition Prescription   PO: Diet: Regular/House; Fluid Consistency: Thin (IDDSI 0)  Oral Nutrition Supplement: N/A  Intake:  75% x 1 meal    Assessment & Plan   Nutrition Diagnosis   Date:  1/10 Updated:  Problem Malnutrition, chronic severe   Etiology End stage COPD   Signs/Symptoms severe muscle wasting and severe subcutaneous fat loss   Status: New    Date:  1/10            Updated:  1/16  Problem Inadequate oral intake    Etiology COPD   Signs/Symptoms Inability to prepare meals   Status: Active    Goal:   Nutrition to support treatment and Increase intake      Nutrition Intervention      Follow treatment progress, Care plan reviewed, Encourage intake    Encouraged PO intake on current diet as tolerated  Provide Magic Cup 1x daily  Consider appetite stimulant   Megace if not contraindicated    Monitoring/Evaluation:   Per protocol, I&O, PO intake, Supplement intake, Pertinent labs, Symptoms, POC/GOC    Leola Acharya MS,RD,LD  Time Spent: 25min

## 2025-01-16 NOTE — PROGRESS NOTES
[facility] Medicine Services  PROGRESS NOTE    Patient Name: Kristen Jean  : 1960  MRN: [id number]    Date of Admission: 2025  Primary Care Physician: Moiz Nova MD    Subjective   Subjective     CC:  Shortness of breath    HPI:  Patient seen resting in bedside chair in no acute distress.  Patient states recent symptoms ongoing for a week, however states her respiratory status is improved since yesterday.  Weaning off oxygen today closer to baseline oxygen.      Objective   Objective     Vital Signs:   Temp:  [98.2 °F (36.8 °C)-99.1 °F (37.3 °C)] 98.7 °F (37.1 °C)  Heart Rate:  [] 116  Resp:  [18-22] 18  BP: ()/(44-82) 127/48  Flow (L/min) (Oxygen Therapy):  [4-5] 4     Physical Exam  Constitutional:       General: She is not in acute distress.  Cardiovascular:      Rate and Rhythm: Tachycardia present.      Pulses: Normal pulses.   Pulmonary:      Effort: Pulmonary effort is normal. No respiratory distress.      Comments: Diminished breath sounds throughout  Abdominal:      General: There is no distension.      Palpations: Abdomen is soft.      Tenderness: There is no abdominal tenderness. There is no guarding or rebound.   Musculoskeletal:      Right lower leg: No edema.      Left lower leg: No edema.   Skin:     General: Skin is warm.   Neurological:      Mental Status: She is alert and oriented to person, place, and time.   Psychiatric:         Mood and Affect: Mood normal.         Behavior: Behavior normal.          Results Reviewed:  LAB RESULTS:      Lab 25  0406 01/15/25  0417 25  0427 25  0344 25  0554 25  2135 25  1859 25  1842 25  0408 25  1807 25  1622   WBC 29.66* 21.94* 17.07* 12.92* 11.64*  --    < >  --    < >  --  16.67*   HEMOGLOBIN 9.9* 10.1* 9.8* 9.1* 9.4*  --    < >  --    < >  --  10.1*   HEMATOCRIT 30.5* 31.4* 30.6* 28.1* 28.8*  --    < >  --    < >  --  30.4*   PLATELETS  977* 866* 816* 727* 648*  --    < >  --    < >  --  493*   NEUTROS ABS 24.59* 16.61* 12.23* 8.84* 7.69*  --   --   --    < >  --  12.88*   IMMATURE GRANS (ABS) 0.42* 0.37* 0.30* 0.21* 0.17*  --   --   --    < >  --  0.09*   LYMPHS ABS 1.97 2.49 2.28 1.95 1.76  --   --   --    < >  --  1.74   MONOS ABS 1.43* 1.31* 1.32* 1.18* 1.16*  --   --   --    < >  --  1.50*   EOS ABS 1.16* 1.07* 0.88* 0.69* 0.80*  --   --   --    < >  --  0.42*   MCV 81.6 82.6 82.5 81.9 81.8  --    < >  --    < >  --  79.8   SED RATE  --   --   --   --   --   --   --   --   --   --  113*   CRP  --   --   --   --   --   --   --   --   --  13.13*  --    PROCALCITONIN  --   --   --   --   --   --   --   --   --  0.09  --    HSTROP T  --   --   --   --   --  14*  --  12  --  12 11    < > = values in this interval not displayed.         Lab 01/16/25  0406 01/15/25  0417 01/14/25  0427 01/13/25  0344 01/12/25  0555 01/11/25  1842 01/11/25  0408 01/09/25  1807 01/09/25  1622   SODIUM 129* 131* 130* 131* 134* 132* 129*  --  127*   POTASSIUM 4.0 4.2 4.1 4.0 4.2 4.6  4.6 3.1*  --  3.4*   CHLORIDE 91* 93* 93* 95* 98 97* 93*  --  90*   CO2 30.0* 33.0* 31.0* 30.0* 29.0 25.0 27.0  --  27.0   ANION GAP 8.0 5.0 6.0 6.0 7.0 10.0 9.0  --  10.0   BUN 5* 5* 4* 4* 3* 3* 3*  --  5*   CREATININE 0.24* 0.26* 0.22* 0.26* 0.19* 0.26* 0.24*  --  0.31*   EGFR 126.0 123.6 128.6 123.6 133.3 123.6 126.0  --  118.4   GLUCOSE 115* 105* 93 98 99 115* 116*  --  131*   CALCIUM 8.4* 9.2 9.1 8.5* 8.8 8.8 8.6  --  9.1   MAGNESIUM  --   --  2.1 2.0 2.2 1.8 1.8  --   --    PHOSPHORUS  --   --   --   --  3.6 1.8*  --   --   --    HEMOGLOBIN A1C  --   --   --   --   --   --   --   --  6.00*   TSH  --   --   --   --   --   --   --  0.160*  --          Lab 01/11/25 1842 01/09/25  1622   TOTAL PROTEIN 5.9* 6.1   ALBUMIN 3.0* 3.2*   GLOBULIN 2.9 2.9   ALT (SGPT) 51* 21   AST (SGOT) 101* 29   BILIRUBIN <0.2 0.3   ALK PHOS 434* 201*   LIPASE  --  18         Lab 01/11/25 2135 01/11/25 1842  01/09/25  1807 01/09/25  1622   PROBNP  --   --   --  264.7   HSTROP T 14* 12 12 11         Lab 01/09/25  1807   CHOLESTEROL 144   LDL CHOL 88   HDL CHOL 41   TRIGLYCERIDES 74             Brief Urine Lab Results  (Last result in the past 365 days)        Color   Clarity   Blood   Leuk Est   Nitrite   Protein   CREAT   Urine HCG        01/10/25 0730 Yellow   Clear   Negative   Negative   Negative   Negative                   Microbiology Results Abnormal       None            No radiology results from the last 24 hrs    Results for orders placed during the hospital encounter of 01/09/25    Adult Transthoracic Echo Complete W/ Cont if Necessary Per Protocol    Interpretation Summary    Left ventricular systolic function is hyperdynamic (EF > 70%).    No hemodynamically significant valvular disease present.    The right ventricular cavity is mildly dilated.    Estimated right ventricular systolic pressure from tricuspid regurgitation is normal (<35 mmHg).    There is a left pleural effusion.      Current medications:  Scheduled Meds:Pharmacy Consult, , Not Applicable, BID  apixaban, 5 mg, Oral, Q12H  cefTRIAXone, 2,000 mg, Intravenous, Q24H  dilTIAZem CD, 180 mg, Oral, Q24H  Fluticasone Furoate-Vilanterol, 1 puff, Inhalation, Daily - RT  ipratropium-albuterol, 3 mL, Nebulization, 4x Daily - RT  lactated ringers, 500 mL, Intravenous, Once  melatonin, 5 mg, Oral, Nightly  metoprolol tartrate, 25 mg, Oral, Q12H  minocycline, 100 mg, Oral, Q12H  pantoprazole, 40 mg, Oral, Nightly  Pharmacy Meds to Bed Consult, , Not Applicable, Daily  predniSONE, 20 mg, Oral, Daily With Breakfast  sodium chloride, 10 mL, Intravenous, Q12H      Continuous Infusions:Pharmacy Consult,       PRN Meds:.  acetaminophen **OR** acetaminophen **OR** acetaminophen    Albuterol Sulfate NEB Orderable    benzonatate    senna-docusate sodium **AND** polyethylene glycol **AND** bisacodyl **AND** bisacodyl    Calcium Replacement - Follow Nurse / BPA  Driven Protocol    Leland Cough Drops    Hydrocod Varun-Chlorphe Varun ER    LORazepam    Magnesium Standard Dose Replacement - Follow Nurse / BPA Driven Protocol    nitroglycerin    Pharmacy Consult    phenol    Phosphorus Replacement - Follow Nurse / BPA Driven Protocol    Potassium Replacement - Follow Nurse / BPA Driven Protocol    sodium chloride    sodium chloride    sodium chloride    Assessment & Plan   Assessment & Plan     Active Hospital Problems    Diagnosis  POA    **Multifocal pneumonia [J18.9]  Yes    Atrial tachycardia [I47.19]  No    Severe protein-calorie malnutrition [E43]  Yes    Atrial fibrillation/flutter [I48.91]  Yes    Heart failure with improved ejection fraction (HFimpEF) [I50.32]  Yes    Aortic atherosclerosis [I70.0]  Yes    Emphysema of lung [J43.9]  Yes    Asthma-COPD overlap syndrome [J44.89]  Yes    Anxiety disorder [F41.9]  Yes    Hyponatremia [E87.1]  Yes    Hypokalemia [E87.6]  Yes    Protein calorie malnutrition [E46]  Yes    Cachexia [R64]  Yes    End stage COPD [J44.9]  Yes    Hypothyroidism (acquired) [E03.9]  Yes    Mycobacterium avium complex [A31.0]  Yes    Chronic bronchitis [J42]  Yes    Mycobacterium infection [A31.9]  Yes      Resolved Hospital Problems    Diagnosis Date Resolved POA    Pneumonia [J18.9] 01/11/2025 Yes    T2DM (type 2 diabetes mellitus) [E11.9] 01/11/2025 Yes        Brief Hospital Course to date:  Kristen Jean is a 64 y.o. female with a PMH significant for pulmonary Mycobacterium avium complex diagnosed 2015, end-stage COPD, anxiety and protein calorie malnutrition with cachexia who presented to [facility] ED secondary to worsening shortness of breath, higher oxygen requirements, right-sided pleuritic chest pain, was found to have multifocal PNA.  Pulmonology consulted and recommended extended steroid course.  Infectious is consulted and assisting with antibiotic therapy.     Pneumonia  Multifocal pneumonia  Mycobacterium avium  complex  End-stage COPD  Asthma - COPD overlap syndrome  Emphysema of the lung  Chronic bronchitis  - Patient has a history that is significant for advanced COPD with a history of asthma - COPD overlap syndrome, follows [facility] pulmonology  - Does have a history of Mycobacterium AVM complex, appears on CT that her previous RLL nodule has decreased in size  - CT chest without contrast revealed multifocal pneumonia more pronounced RML/RLL  - MRSA swab, streptococcal Legionella urine antigens negative, respiratory panel negative  - Consulted ID,  will require guidance on antimicrobial regimen and duration. Appreciate Dr. Wasserman's assistance. Continue Rocephin/Minocycline. Plan for solely Minocycline upon d/c   -Pulmonology consulted, appreciate recs.  Recommended Breo inhaler, scheduled DuoNebs, scheduled albuterol nebs, and extended course of steroids.  - DuoNebs, Tessalon Perles and albuterol inhaler. Continue steroids.   - Continue nasal cannula oxygen and wean as tolerated     Hyponatremia  Hypokalemia  --Suspect SIADH in the setting of pneumonia versus hypovolemic hyponatremia  - s/p IVFs  -- Na+ stable  - Potassium replacement protocol in place     New Onset Afib with RVR  ?SVT  -- consult Cardiology given new diagnosis, appreciate their assistance  -- started on Eliquis, [id number] 2  -- given IV digoxin but remained tachycardic   -- EKG repeated and concern for SVT   -- pt refused IV Adenosine initially, however, eventually agreed. Briefly HR was better, but then went back to 160s.  Resumed dilt gtt at that point.   -- now off dilt gtt and in NSR  -- continue with Metoprolol and PO Diltiazem as per Cardiology  -- TTE with normal EF      Protein calorie malnutrition  Cachexia of end-stage lung disease  --Patient has low albumin, appears cachectic likely secondary to advanced lung disease  - Nutrition consult for assessment of malnutrition  --Will consider initiation of appetite stimulant     Goals of  care  -- pt still full code for time being.  Will continue to discuss with her given her poor overall prognosis      Low TSH  -- Likely subclinical thyroiditis with low TSH and normal free T4, in the setting of pneumonia  -- Will need repeat of thyroid function in 6 to 8 weeks.     T2DM/prediabetes   HbA1c 6%   LDL 88     Anxiety  --  reordered her benzo, she requested 0.25 mg QID (vs 0.5 mg BID at home).      Expected Discharge Location and Transportation: TBD  Expected Discharge   Expected Discharge Date: 1/16/2025; Expected Discharge Time:      VTE Prophylaxis:  Pharmacologic & mechanical VTE prophylaxis orders are present.         AM-PAC 6 Clicks Score (PT): 20 (01/16/25 0800)    CODE STATUS:   Code Status and Medical Interventions: CPR (Attempt to Resuscitate); Full Support   Ordered at: 01/09/25 2009     Level Of Support Discussed With:    Patient     Code Status (Patient has no pulse and is not breathing):    CPR (Attempt to Resuscitate)     Medical Interventions (Patient has pulse or is breathing):    Full Support       Wojciech Stone,   01/16/25

## 2025-01-16 NOTE — PLAN OF CARE
Problem: Adult Inpatient Plan of Care  Goal: Plan of Care Review  Outcome: Progressing  Goal: Patient-Specific Goal (Individualized)  Outcome: Progressing  Goal: Absence of Hospital-Acquired Illness or Injury  Outcome: Progressing  Intervention: Identify and Manage Fall Risk  Recent Flowsheet Documentation  Taken 1/16/2025 1600 by Gosia Watkins RN  Safety Promotion/Fall Prevention:   activity supervised   assistive device/personal items within reach   clutter free environment maintained   toileting scheduled   safety round/check completed   room organization consistent  Taken 1/16/2025 1400 by Gosia Watkins RN  Safety Promotion/Fall Prevention:   activity supervised   assistive device/personal items within reach   clutter free environment maintained   toileting scheduled   safety round/check completed   room organization consistent  Taken 1/16/2025 1200 by Gosia Watkins RN  Safety Promotion/Fall Prevention:   activity supervised   clutter free environment maintained   assistive device/personal items within reach   toileting scheduled   safety round/check completed   room organization consistent  Taken 1/16/2025 1000 by Gosia Watkins RN  Safety Promotion/Fall Prevention:   activity supervised   assistive device/personal items within reach   clutter free environment maintained   toileting scheduled   safety round/check completed   room organization consistent  Taken 1/16/2025 0800 by Gosia Watkins RN  Safety Promotion/Fall Prevention:   activity supervised   assistive device/personal items within reach   clutter free environment maintained   toileting scheduled   safety round/check completed   room organization consistent  Intervention: Prevent Skin Injury  Recent Flowsheet Documentation  Taken 1/16/2025 1600 by Gosia Watkins RN  Body Position: position changed independently  Skin Protection:   drying agents applied   transparent dressing maintained  Taken 1/16/2025 1400 by Gosia Watkins  Attempted to contact patient to schedule visit. No answer, left voicemail.   RN  Body Position: position changed independently  Skin Protection:   drying agents applied   transparent dressing maintained  Taken 1/16/2025 1200 by Gosia Watkins RN  Body Position: position changed independently  Skin Protection:   drying agents applied   transparent dressing maintained  Taken 1/16/2025 1000 by Gosia Watkins RN  Body Position: position changed independently  Skin Protection:   drying agents applied   transparent dressing maintained  Taken 1/16/2025 0800 by Gosia Watkins RN  Body Position: position changed independently  Skin Protection:   drying agents applied   transparent dressing maintained  Goal: Optimal Comfort and Wellbeing  Outcome: Progressing  Goal: Readiness for Transition of Care  Outcome: Progressing     Problem: Fall Injury Risk  Goal: Absence of Fall and Fall-Related Injury  Outcome: Progressing  Intervention: Promote Injury-Free Environment  Recent Flowsheet Documentation  Taken 1/16/2025 1600 by Gosia Watkins RN  Safety Promotion/Fall Prevention:   activity supervised   assistive device/personal items within reach   clutter free environment maintained   toileting scheduled   safety round/check completed   room organization consistent  Taken 1/16/2025 1400 by Gosia Watkins RN  Safety Promotion/Fall Prevention:   activity supervised   assistive device/personal items within reach   clutter free environment maintained   toileting scheduled   safety round/check completed   room organization consistent  Taken 1/16/2025 1200 by Gosia Watkins RN  Safety Promotion/Fall Prevention:   activity supervised   clutter free environment maintained   assistive device/personal items within reach   toileting scheduled   safety round/check completed   room organization consistent  Taken 1/16/2025 1000 by Gosia Watkins RN  Safety Promotion/Fall Prevention:   activity supervised   assistive device/personal items within reach   clutter free environment maintained   toileting  scheduled   safety round/check completed   room organization consistent  Taken 1/16/2025 0800 by Gosia Watkins, RN  Safety Promotion/Fall Prevention:   activity supervised   assistive device/personal items within reach   clutter free environment maintained   toileting scheduled   safety round/check completed   room organization consistent     Problem: Pain Acute  Goal: Optimal Pain Control and Function  Outcome: Progressing     Problem: Infection  Goal: Absence of Infection Signs and Symptoms  Outcome: Progressing     Problem: Sepsis/Septic Shock  Goal: Optimal Coping  Outcome: Progressing  Goal: Absence of Bleeding  Outcome: Progressing  Goal: Blood Glucose Level Within Target Range  Outcome: Progressing  Goal: Absence of Infection Signs and Symptoms  Outcome: Progressing  Intervention: Promote Recovery  Recent Flowsheet Documentation  Taken 1/16/2025 1600 by Gosia Watkins, RN  Activity Management: up in chair  Taken 1/16/2025 1400 by Gosia Watkins, RN  Activity Management: up in chair  Taken 1/16/2025 1200 by Gosia Watkins, RN  Activity Management: up in chair  Taken 1/16/2025 1000 by Gosia Watkins, RN  Activity Management: up in chair  Taken 1/16/2025 0800 by Gosia Watkins, RN  Activity Management: up in chair  Goal: Optimal Nutrition Delivery  Outcome: Progressing   Goal Outcome Evaluation:

## 2025-01-16 NOTE — CASE MANAGEMENT/SOCIAL WORK
Continued Stay Note  [facility]     Patient Name: Kristen Jean  MRN: [id number]  Today's Date: 1/16/2025    Admit Date: 1/9/2025    Plan: home versus home with home health   Discharge Plan       Row Name 01/16/25 0915       Plan    Plan home versus home with home health    Patient/Family in Agreement with Plan yes    Plan Comments Met with Ms. Jean at the bedside to discuss discharge plan. She requested a referral for [facility], and  called the referral in. Liaison declined referral.  also called referral to [facility] yesterday.   is still waiting on replay from [facility].  will continue to follow plan of care and assist with discharge planning needs as indicated.    Final Discharge Disposition Code 01 - home or self-care                   Discharge Codes    No documentation.                 Expected Discharge Date and Time       Expected Discharge Date Expected Discharge Time    Jan 16, 2025               Blanca Steinberg RN

## 2025-01-17 LAB
ANION GAP SERPL CALCULATED.3IONS-SCNC: 12 MMOL/L (ref 5–15)
BACTERIA SPEC AEROBE CULT: NORMAL
BASOPHILS # BLD AUTO: 0.1 10*3/MM3 (ref 0–0.2)
BASOPHILS NFR BLD AUTO: 0.4 % (ref 0–1.5)
BUN SERPL-MCNC: 5 MG/DL (ref 8–23)
BUN/CREAT SERPL: 21.7 (ref 7–25)
CALCIUM SPEC-SCNC: 8.9 MG/DL (ref 8.6–10.5)
CHLORIDE SERPL-SCNC: 87 MMOL/L (ref 98–107)
CO2 SERPL-SCNC: 29 MMOL/L (ref 22–29)
CREAT SERPL-MCNC: 0.23 MG/DL (ref 0.57–1)
DEPRECATED RDW RBC AUTO: 43.8 FL (ref 37–54)
EGFRCR SERPLBLD CKD-EPI 2021: 127.3 ML/MIN/1.73
EOSINOPHIL # BLD AUTO: 1.23 10*3/MM3 (ref 0–0.4)
EOSINOPHIL NFR BLD AUTO: 4.6 % (ref 0.3–6.2)
ERYTHROCYTE [DISTWIDTH] IN BLOOD BY AUTOMATED COUNT: 14.9 % (ref 12.3–15.4)
FERRITIN SERPL-MCNC: 109 NG/ML (ref 13–150)
GLUCOSE SERPL-MCNC: 96 MG/DL (ref 65–99)
HCT VFR BLD AUTO: 30.2 % (ref 34–46.6)
HGB BLD-MCNC: 9.8 G/DL (ref 12–15.9)
IMM GRANULOCYTES # BLD AUTO: 0.43 10*3/MM3 (ref 0–0.05)
IMM GRANULOCYTES NFR BLD AUTO: 1.6 % (ref 0–0.5)
IRON 24H UR-MRATE: 14 MCG/DL (ref 37–145)
IRON SATN MFR SERPL: 8 % (ref 20–50)
LYMPHOCYTES # BLD AUTO: 1.43 10*3/MM3 (ref 0.7–3.1)
LYMPHOCYTES NFR BLD AUTO: 5.3 % (ref 19.6–45.3)
MCH RBC QN AUTO: 26.3 PG (ref 26.6–33)
MCHC RBC AUTO-ENTMCNC: 32.5 G/DL (ref 31.5–35.7)
MCV RBC AUTO: 81.2 FL (ref 79–97)
MONOCYTES # BLD AUTO: 1.44 10*3/MM3 (ref 0.1–0.9)
MONOCYTES NFR BLD AUTO: 5.4 % (ref 5–12)
NEUTROPHILS NFR BLD AUTO: 22.1 10*3/MM3 (ref 1.7–7)
NEUTROPHILS NFR BLD AUTO: 82.7 % (ref 42.7–76)
NRBC BLD AUTO-RTO: 0 /100 WBC (ref 0–0.2)
PLATELET # BLD AUTO: 1106 10*3/MM3 (ref 140–450)
PMV BLD AUTO: 8.8 FL (ref 6–12)
POTASSIUM SERPL-SCNC: 3.8 MMOL/L (ref 3.5–5.2)
RBC # BLD AUTO: 3.72 10*6/MM3 (ref 3.77–5.28)
SODIUM SERPL-SCNC: 128 MMOL/L (ref 136–145)
TIBC SERPL-MCNC: 176 MCG/DL (ref 298–536)
TRANSFERRIN SERPL-MCNC: 118 MG/DL (ref 200–360)
WBC NRBC COR # BLD AUTO: 26.73 10*3/MM3 (ref 3.4–10.8)

## 2025-01-17 PROCEDURE — 94799 UNLISTED PULMONARY SVC/PX: CPT

## 2025-01-17 PROCEDURE — 99222 1ST HOSP IP/OBS MODERATE 55: CPT | Performed by: INTERNAL MEDICINE

## 2025-01-17 PROCEDURE — 63710000001 PREDNISONE PER 1 MG: Performed by: INTERNAL MEDICINE

## 2025-01-17 PROCEDURE — 82728 ASSAY OF FERRITIN: CPT | Performed by: INTERNAL MEDICINE

## 2025-01-17 PROCEDURE — 94664 DEMO&/EVAL PT USE INHALER: CPT

## 2025-01-17 PROCEDURE — 97530 THERAPEUTIC ACTIVITIES: CPT

## 2025-01-17 PROCEDURE — 94761 N-INVAS EAR/PLS OXIMETRY MLT: CPT

## 2025-01-17 PROCEDURE — 97535 SELF CARE MNGMENT TRAINING: CPT

## 2025-01-17 PROCEDURE — 85025 COMPLETE CBC W/AUTO DIFF WBC: CPT | Performed by: STUDENT IN AN ORGANIZED HEALTH CARE EDUCATION/TRAINING PROGRAM

## 2025-01-17 PROCEDURE — 84466 ASSAY OF TRANSFERRIN: CPT | Performed by: INTERNAL MEDICINE

## 2025-01-17 PROCEDURE — 80048 BASIC METABOLIC PNL TOTAL CA: CPT | Performed by: STUDENT IN AN ORGANIZED HEALTH CARE EDUCATION/TRAINING PROGRAM

## 2025-01-17 PROCEDURE — 99232 SBSQ HOSP IP/OBS MODERATE 35: CPT | Performed by: STUDENT IN AN ORGANIZED HEALTH CARE EDUCATION/TRAINING PROGRAM

## 2025-01-17 PROCEDURE — 83540 ASSAY OF IRON: CPT | Performed by: INTERNAL MEDICINE

## 2025-01-17 RX ORDER — MIRTAZAPINE 15 MG/1
15 TABLET, FILM COATED ORAL NIGHTLY
Status: DISCONTINUED | OUTPATIENT
Start: 2025-01-17 | End: 2025-01-20 | Stop reason: HOSPADM

## 2025-01-17 RX ADMIN — IPRATROPIUM BROMIDE AND ALBUTEROL SULFATE 3 ML: 2.5; .5 SOLUTION RESPIRATORY (INHALATION) at 19:01

## 2025-01-17 RX ADMIN — HYDROCODONE POLISTIREX AND CHLORPHENIRAMINE POLISTIREX 2.5 ML: 10; 8 SUSPENSION, EXTENDED RELEASE ORAL at 11:29

## 2025-01-17 RX ADMIN — IPRATROPIUM BROMIDE AND ALBUTEROL SULFATE 3 ML: 2.5; .5 SOLUTION RESPIRATORY (INHALATION) at 15:13

## 2025-01-17 RX ADMIN — IPRATROPIUM BROMIDE AND ALBUTEROL SULFATE 3 ML: 2.5; .5 SOLUTION RESPIRATORY (INHALATION) at 07:07

## 2025-01-17 RX ADMIN — PREDNISONE 20 MG: 20 TABLET ORAL at 08:57

## 2025-01-17 RX ADMIN — LORAZEPAM 0.25 MG: 0.5 TABLET ORAL at 21:08

## 2025-01-17 RX ADMIN — METOPROLOL TARTRATE 25 MG: 25 TABLET, FILM COATED ORAL at 08:57

## 2025-01-17 RX ADMIN — FLUTICASONE FUROATE AND VILANTEROL 1 PUFF: 200; 25 POWDER RESPIRATORY (INHALATION) at 19:01

## 2025-01-17 RX ADMIN — DILTIAZEM HYDROCHLORIDE 180 MG: 180 CAPSULE, EXTENDED RELEASE ORAL at 08:57

## 2025-01-17 RX ADMIN — MINOCYCLINE HYDROCHLORIDE 100 MG: 50 CAPSULE ORAL at 21:08

## 2025-01-17 RX ADMIN — PANTOPRAZOLE SODIUM 40 MG: 40 TABLET, DELAYED RELEASE ORAL at 21:08

## 2025-01-17 RX ADMIN — IPRATROPIUM BROMIDE AND ALBUTEROL SULFATE 3 ML: 2.5; .5 SOLUTION RESPIRATORY (INHALATION) at 12:23

## 2025-01-17 RX ADMIN — METOPROLOL TARTRATE 25 MG: 25 TABLET, FILM COATED ORAL at 21:08

## 2025-01-17 RX ADMIN — MINOCYCLINE HYDROCHLORIDE 100 MG: 50 CAPSULE ORAL at 08:57

## 2025-01-17 RX ADMIN — APIXABAN 5 MG: 5 TABLET, FILM COATED ORAL at 08:57

## 2025-01-17 RX ADMIN — APIXABAN 5 MG: 5 TABLET, FILM COATED ORAL at 21:08

## 2025-01-17 RX ADMIN — MIRTAZAPINE 15 MG: 15 TABLET, FILM COATED ORAL at 21:08

## 2025-01-17 RX ADMIN — LORAZEPAM 0.25 MG: 0.5 TABLET ORAL at 06:15

## 2025-01-17 NOTE — THERAPY TREATMENT NOTE
Patient Name: Kristen Jean  : 1960    MRN: [id number]                              Today's Date: 2025       Admit Date: 2025    Visit Dx:     ICD-10-CM ICD-9-CM   1. Multifocal pneumonia  J18.9 486   2. History of MAC infection  Z86.19 V12.09   3. Bandemia  D72.825 288.66   4. Hyponatremia  E87.1 276.1   5. Atrial tachycardia  I47.19 427.89   6. Aortic atherosclerosis  I70.0 440.0   7. Heart failure with improved ejection fraction (HFimpEF)  I50.32 428.32   8. Paroxysmal atrial fibrillation  I48.0 427.31   9. Severe protein-calorie malnutrition  E43 262   10. Mycobacterium avium complex  A31.0 031.2   11. Hypothyroidism (acquired)  E03.9 244.9   12. End stage COPD  J44.9 496   13. Cachexia  R64 799.4   14. Asthma-COPD overlap syndrome  J44.89 493.20   15. Centrilobular emphysema  J43.2 492.8   16. Pneumonia of right upper lobe due to infectious organism  J18.9 486   17. Mycobacterium infection  A31.9 031.9   18. Mucopurulent chronic bronchitis  J41.1 491.1     Patient Active Problem List   Diagnosis    Chronic bronchitis    Uncomplicated asthma    Mycobacterium infection    Emphysema of lung    Asthma-COPD overlap syndrome    Anxiety disorder    Multifocal pneumonia    Hyponatremia    Hypokalemia    Protein calorie malnutrition    Cachexia    End stage COPD    Hypothyroidism (acquired)    Mycobacterium avium complex    Severe protein-calorie malnutrition    Atrial fibrillation/flutter    Heart failure with improved ejection fraction (HFimpEF)    Aortic atherosclerosis    Atrial tachycardia     Past Medical History:   Diagnosis Date    Anxiety disorder 2025    Asthma-COPD overlap syndrome 2025    Breast injury     Cachexia 2025    Chronic bronchitis 2016    Emphysema of lung 2025    Hypothyroidism (acquired) 2025    Mycobacterium infection 2016    T2DM (type 2 diabetes mellitus) 2025     Past Surgical History:   Procedure Laterality Date    TOOTH  EXTRACTION      1990 and 2002      General Information       Row Name 01/17/25 1410          Physical Therapy Time and Intention    Document Type therapy note (daily note)  -AE     Mode of Treatment physical therapy  -AE       Row Name 01/17/25 1410          General Information    Patient Profile Reviewed yes  -AE     Existing Precautions/Restrictions fall;oxygen therapy device and L/min  -AE     Barriers to Rehab medically complex;previous functional deficit  -AE       Row Name 01/17/25 1410          Cognition    Orientation Status (Cognition) oriented x 3  -AE       Row Name 01/17/25 1410          Safety Issues/Impairments Affecting Functional Mobility    Safety Issues Affecting Function (Mobility) awareness of need for assistance;insight into deficits/self-awareness;safety precaution awareness;safety precautions follow-through/compliance;sequencing abilities  -AE     Impairments Affecting Function (Mobility) endurance/activity tolerance;postural/trunk control;shortness of breath;strength;balance  -AE               User Key  (r) = Recorded By, (t) = Taken By, (c) = Cosigned By      Initials Name Provider Type    AE Renan Yen PT Physical Therapist                   Mobility       Row Name 01/17/25 1411          Bed Mobility    Comment, (Bed Mobility) Pt received and left UI.  -AE       Row Name 01/17/25 1411          Transfers    Comment, (Transfers) VCs for hand placement and sequencing. Unsteadiness noted throughout session.  -AE       Row Name 01/17/25 1411          Sit-Stand Transfer    Sit-Stand [place] (Transfers) contact guard;verbal cues  -AE       Row Name 01/17/25 1411          Gait/Stairs (Locomotion)    [place] Level (Gait) contact guard;verbal cues  -AE     Distance in Feet (Gait) 20  -AE     Deviations/Abnormal Patterns (Gait) bilateral deviations;betty decreased;gait speed decreased;stride length decreased;scissoring  -AE     Bilateral Gait Deviations heel strike decreased;weight  shift ability decreased  -AE     Comment, (Gait/Stairs) Pt demo step through gait pattern with slowed betty, decreased step length, and unsteadiness with all mobility. Pt required increased cues to improve breathing technique while ambulating. Multiple LOB noted with short ambulation. Further distance limited by SOA and SaO2 dropping to 84%.  -AE               User Key  (r) = Recorded By, (t) = Taken By, (c) = Cosigned By      Initials Name Provider Type    AE Renan Yen PT Physical Therapist                   Obj/Interventions       Row Name 01/17/25 1416          Balance    Balance Assessment sitting static balance;sitting dynamic balance;standing static balance;standing dynamic balance  -AE     Static Sitting Balance standby assist  -AE     Dynamic Sitting Balance contact guard  -AE     Position, Sitting Balance unsupported;sitting in chair  -AE     Static Standing Balance contact guard  -AE     Dynamic Standing Balance contact guard  -AE               User Key  (r) = Recorded By, (t) = Taken By, (c) = Cosigned By      Initials Name Provider Type    AE Renan Yen PT Physical Therapist                   Goals/Plan    No documentation.                  Clinical Impression       Row Name 01/17/25 1416          Pain Scale: FACES Pre/Post-Treatment    Pain: FACES Scale, Pretreatment 0-->no hurt  -AE     Posttreatment Pain Rating 0-->no hurt  -AE       Row Name 01/17/25 1416          Plan of Care Review    Plan of Care Reviewed With patient  -AE     Progress no change  -AE     Outcome Evaluation Pt continues to present with decreased functional mobility and decreased activity tolerance compared to baseline. Pt ambulated 20ft with CGA, unsupported. Multiple LOB noted during session. Continue to progress per pt tolerance.  -AE       Row Name 01/17/25 1416          Vital Signs    Pre Systolic BP Rehab 113  -AE     Pre Treatment Diastolic BP 66  -AE     Pretreatment Heart Rate (beats/min) 89  -AE      Posttreatment Heart Rate (beats/min) 86  -AE     Pre SpO2 (%) 92  -AE     O2 Delivery Pre Treatment nasal cannula  -AE     Intra SpO2 (%) 84  -AE     O2 Delivery Intra Treatment nasal cannula  -AE     Post SpO2 (%) 91  -AE     O2 Delivery Post Treatment nasal cannula  -AE     Pre Patient Position Sitting  -AE     Intra Patient Position Standing  -AE     Post Patient Position Sitting  -AE       Row Name 01/17/25 1416          Positioning and Restraints    Pre-Treatment Position sitting in chair/recliner  -AE     Post Treatment Position chair  -AE     In Chair notified nsg;reclined;call light within reach;encouraged to call for assist;exit alarm on;waffle cushion;legs elevated  -AE               User Key  (r) = Recorded By, (t) = Taken By, (c) = Cosigned By      Initials Name Provider Type    Renan Tovar, PT Physical Therapist                   Outcome Measures       Row Name 01/17/25 1422 01/17/25 0855       How much help from another person do you currently need...    Turning from your back to your side while in flat bed without using bedrails? 4  -AE 4  -AC    Moving from lying on back to sitting on the side of a flat bed without bedrails? 3  -AE 4  -AC    Moving to and from a bed to a chair (including a wheelchair)? 3  -AE 3  -AC    Standing up from a chair using your arms (e.g., wheelchair, bedside chair)? 3  -AE 3  -AC    Climbing 3-5 steps with a railing? 3  -AE 3  -AC    To walk in hospital room? 3  -AE 3  -AC    AM-PAC 6 Clicks Score (PT) 19  -AE 20  -AC    Highest Level of Mobility Goal 6 --> Walk 10 steps or more  -AE 6 --> Walk 10 steps or more  -AC      Row Name 01/17/25 1422 01/17/25 1035       Functional Assessment    Outcome Measure Options AM-PAC 6 Clicks Basic Mobility (PT)  -AE AM-PAC 6 Clicks Daily Activity (OT)  -SCOTTY              User Key  (r) = Recorded By, (t) = Taken By, (c) = Cosigned By      Initials Name Provider Type    Estefany Salguero, OT Occupational Therapist    OSVALDO Yen  Renan, PT Physical Therapist    AC Anabelle Suh, RN Registered Nurse                                 Physical Therapy Education       Title: PT OT SLP Therapies (In Progress)       Topic: Physical Therapy (In Progress)       Point: Mobility training (In Progress)       Learning Progress Summary            Patient Acceptance, E, NR by AE at 1/17/2025 1344    Acceptance, E, NR by AS at 1/15/2025 1435    Acceptance, E, VU by AC at 1/13/2025 1547    Acceptance, E, VU,NR by LO at 1/10/2025 0945    Comment: PT POC                      Point: Home exercise program (In Progress)       Learning Progress Summary            Patient Acceptance, E, NR by AS at 1/15/2025 1435    Acceptance, E, VU by AC at 1/13/2025 1547    Acceptance, E, VU,NR by LO at 1/10/2025 0945    Comment: PT POC                      Point: Body mechanics (In Progress)       Learning Progress Summary            Patient Acceptance, E, NR by AE at 1/17/2025 1344    Acceptance, E, NR by AS at 1/15/2025 1435    Acceptance, E, VU by AC at 1/13/2025 1547    Acceptance, E, VU,NR by LO at 1/10/2025 0945    Comment: PT POC                      Point: Precautions (In Progress)       Learning Progress Summary            Patient Acceptance, E, NR by AE at 1/17/2025 1344    Acceptance, E, NR by AS at 1/15/2025 1435    Acceptance, E, VU by AC at 1/13/2025 1547    Acceptance, E, VU,NR by LO at 1/10/2025 0945    Comment: PT POC                                      User Key       Initials Effective Dates Name Provider Type Discipline    AS 12/13/24 -  Joyce Gotti, PTA Physical Therapist Assistant PT    LO 06/16/21 -  Elina Zaidi, PT Physical Therapist PT    AE 09/21/21 -  Renan Yen, PT Physical Therapist PT    AC 07/11/24 -  Gricelda Diaz, PT Physical Therapist PT                  PT Recommendation and Plan     Progress: no change  Outcome Evaluation: Pt continues to present with decreased functional mobility and decreased activity tolerance compared to  baseline. Pt ambulated 20ft with CGA, unsupported. Multiple LOB noted during session. Continue to progress per pt tolerance.     Time Calculation:         PT Charges       Row Name 01/17/25 1423             Time Calculation    Start Time 1344  -AE      PT Received On 01/17/25  -AE      PT Goal Re-Cert Due Date 01/20/25  -AE         Timed Charges    68133 - PT Therapeutic Activity Minutes 17  -AE         Total Minutes    Timed Charges Total Minutes 17  -AE       Total Minutes 17  -AE                User Key  (r) = Recorded By, (t) = Taken By, (c) = Cosigned By      Initials Name Provider Type    AE Renan Yen, PT Physical Therapist                  Therapy Charges for Today       Code Description Service Date Service Provider Modifiers Qty    11770986527 HC PT THERAPEUTIC ACT EA 15 MIN 1/17/2025 Renan Yen, LYNDON GP 1            PT G-Codes  Outcome Measure Options: AM-PAC 6 Clicks Basic Mobility (PT)  AM-PAC 6 Clicks Score (PT): 19  AM-PAC 6 Clicks Score (OT): 21  PT Discharge Summary  Anticipated Discharge Disposition (PT): inpatient rehabilitation facility    Renan Yen PT  1/17/2025

## 2025-01-17 NOTE — PROGRESS NOTES
INFECTIOUS DISEASE Progress Note    Kristen Jean  1960  [id number]    Admission Date: 1/9/2025      Requesting Provider: Rosi Medina MD  Evaluating Physician: Az Wasserman MD    Reason for Consultation: Multifocal pneumonia with h/o MAC on chronic therapy    History of present illness:     1/10/2025:Patient is a 64 y.o. female with pulmonary Mycobacterium avium infection 2015/on chronic therapy, ES COPD/3L O2 baseline, and protein calorie malnutrition who presented to [facility] ED on 1/9/25 with worsening shortness of breath with right pleuritic chest pain, and higher oxygen requirements over the last week prior to admission. About 2 days ago, the patient bent over and she felt like she tore something in her right lower chest.  She quit smoking 11/2024, but all her neighbors smoke in the apartment complex.  The past week, she has needed 5-6 L/min O2 and is still satting at 93% on this oxygen setting.  She follow pulmonology and ID at [facility].  She has been noncompliant with her antibiotics for BONI and rotating taking Levaquin, Azithromycin and Rifampin because of complaints of side effects.   She denies fever, chills, nausea, vomiting, diarrhea, or dysuria.  She has remained afebrile with initial tachycardia. Initial labs were , PCT 0.09, WBC 16,700 with 77% neutrophils, Na 127, K 3.4, creatinine 0.31, CRP 13.13, and CPK 52.  A respiratory panel PCR was negative.  Urinary antigens for Strep pneumo and Legionella were negative. Nasal MRSA PCR is negative. A CT scan of chest without contrast showed right lung multifocal pneumonia, decrease in size of RLL pulmonary nodule.  She is currently on Merrem and Zyvox.  ID was asked to evaluate and manage her antibiotic therapy.    She has been noncompliant with follow-up.  Her last ID visit at  was in 5/24.  She has been noncompliant with her azithromycin, rifampin, and Levaquin as she takes these medications intermittently.  She states that she  has not received an RSV vaccine, Prevnar 20 pneumococcal vaccine, influenza vaccine, or COVID-19 vaccine booster     1/11/2025:  She has remained afebrile.  White blood cell count is 13.9.   MRSA nasal PCR was negative.  Urinary Legionella and pneumococcal antigens were negative.  O2 saturation is 90% on 3 L.    He complains of pleuritic right chest pain.  She complains of dyspnea.  She has minimal sputum production.   she refused taking the doxycycline due to prior nausea on doxycycline.  She is agreeable to trying minocycline.  She will need to take the minocycline with food but not any calcium containing food within 1 to 2 hours of the capsules.  I discussed this issue with her today in detail     1/12/2025: She remains afebrile. White blood cell count is 11.6.   She feels better today.  She has decreased right pleuritic chest pain.  She denies increased cough and sputum production.  She denies nausea vomiting. She denies increased dyspnea.  She is tolerating the minocycline with no nausea.     1/13/2025:   She remains afebrile.  White blood cell count is 12.9.  Creatinine is 0.26.  Sodium is 131.  She is on 3 L of oxygen with an O2 saturation of 94%.   She continues to feel better.  Her right pleuritic chest pain is resolved.  She denies increased dyspnea and cough.  She denies hemoptysis    1/14/2025:  She remains afebrile.  Creatinine is 0.22.  White blood cell count is 17.1.  She is on prednisone at 20 mg/day. .  O2 saturation is 95% on 4 L.   She complains of some residual right pleuritic chest pain.  She has a cough with minimal sputum production.    1/15/24:  She remains afebrile.   White blood cell count is 21.9. and Rocephin dose is currently scheduled for 1800.  She is currently on 5 L of oxygen with an O2 saturation of 93%.   She is on 20 mg of prednisone per day.  She feels partially improved.  She does not feel ready to go home today and wants to wait until tomorrow.    1/16/2024: She had a low-grade  fever to 100.5 yesterday.  Since then she has been afebrile.  A strep throat swab was sent yesterday which is pending.  White blood cell count is 29.7.   She complains of malaise and fatigue.  She denies increased sputum production.  She would like to disposition to a subacute nursing facility    1/17/2024:  She has been afebrile over the last 24 hours.  White blood cell count is 26.7.  Creatinine is 0.23.   Respiratory virus panel PCR from 1/16 was negative.   O2 saturation is 95% on 4 L.   she feels better today.  He denies increased cough and sputum production     Past Medical History:   Diagnosis Date    Anxiety disorder 01/09/2025    Asthma-COPD overlap syndrome 01/09/2025    Breast injury     Cachexia 01/09/2025    Chronic bronchitis 08/11/2016    Emphysema of lung 01/09/2025    Hypothyroidism (acquired) 01/09/2025    Mycobacterium infection 08/11/2016    T2DM (type 2 diabetes mellitus) 01/09/2025       Past Surgical History:   Procedure Laterality Date    TOOTH EXTRACTION      1990 and 2002       Family History   Problem Relation Age of Onset    Dementia Father     Breast cancer Neg Hx     Ovarian cancer Neg Hx        Social History     Socioeconomic History    Marital status: Single   Tobacco Use    Smoking status: Every Day   Vaping Use    Vaping status: Never Used   Substance and Sexual Activity    Alcohol use: No    Drug use: Never    Sexual activity: Defer       Allergies   Allergen Reactions    Codeine     Fluticasone-Salmeterol     Penicillins          Medication:    Current Facility-Administered Medications:     !!! Please obtain patients home medications that are stored in central pharmacy ([facility] and return to patient prior to discharge!, , Not Applicable, BID, Nicole Ch, PharmD, Given at 01/10/25 2337    acetaminophen (TYLENOL) tablet 650 mg, 650 mg, Oral, Q4H PRN, 650 mg at 01/15/25 2337 **OR** acetaminophen (TYLENOL) 160 MG/5ML oral solution 650 mg, 650 mg,  Oral, Q4H PRN **OR** acetaminophen (TYLENOL) suppository 650 mg, 650 mg, Rectal, Q4H PRN, Rosi Medina MD    albuterol (PROVENTIL) nebulizer solution 0.083% 2.5 mg/3mL, 2.5 mg, Nebulization, Q4H PRN, Luis Lanier MD, 2.5 mg at 01/16/25 0415    apixaban (ELIQUIS) tablet 5 mg, 5 mg, Oral, Q12H, Wayne Salcedo IV, MD, 5 mg at 01/16/25 2052    benzonatate (TESSALON) capsule 100 mg, 100 mg, Oral, TID PRN, Rosi Medina MD, 100 mg at 01/15/25 2108    sennosides-docusate (PERICOLACE) 8.6-50 MG per tablet 2 tablet, 2 tablet, Oral, BID PRN **AND** polyethylene glycol (MIRALAX) packet 17 g, 17 g, Oral, Daily PRN **AND** bisacodyl (DULCOLAX) EC tablet 5 mg, 5 mg, Oral, Daily PRN **AND** bisacodyl (DULCOLAX) suppository 10 mg, 10 mg, Rectal, Daily PRN, Rosi Medina MD    Calcium Replacement - Follow Nurse / BPA Driven Protocol, , Not Applicable, PRN, Rosi Medina MD    cefTRIAXone (ROCEPHIN) 2,000 mg in sodium chloride 0.9 % 100 mL MBP, 2,000 mg, Intravenous, Q24H, Az Wasserman MD, Last Rate: 200 mL/hr at 01/16/25 0831, 2,000 mg at 01/16/25 0831    dilTIAZem CD (CARDIZEM CD) 24 hr capsule 180 mg, 180 mg, Oral, Q24H, Sherry Darden, APRN, 180 mg at 01/15/25 0839    Fluticasone Furoate-Vilanterol (BREO ELLIPTA) 200-25 MCG/ACT inhaler 1 puff **Patient Supplied Med**, 1 puff, Inhalation, Daily - RT, Dorian Sinha RPH, 1 puff at 01/16/25 2105    [place] Cough Drops (lozenges), 1 lozenge, Buccal, Q2H PRN, Marlen Blevins PA-C, 1 lozenge at 01/11/25 1754    Hydrocod Varun-Chlorphe Varun ER (TUSSIONEX PENNKINETIC) 10-8 MG/5ML ER suspension 2.5 mL, 2.5 mL, Oral, Q12H PRN, Wojciech Stone, , 2.5 mL at 01/16/25 1122    ipratropium-albuterol (DUO-NEB) nebulizer solution 3 mL, 3 mL, Nebulization, 4x Daily - RT, oRsi Medina MD, 3 mL at 01/17/25 0707    lactated ringers bolus 500 mL, 500 mL, Intravenous, Once, Rosi Medina MD    LORazepam (ATIVAN) tablet 0.25 mg, 0.25 mg, Oral, Q6H  PRN, Franchesca Stewart MD, 0.25 mg at 01/17/25 0615    Magnesium Standard Dose Replacement - Follow Nurse / BPA Driven Protocol, , Not Applicable, Carol ENNIS Muhammad, MD    melatonin tablet 5 mg, 5 mg, Oral, Nightly, Rosi Medina MD, 5 mg at 01/16/25 2052    metoprolol tartrate (LOPRESSOR) tablet 25 mg, 25 mg, Oral, Q12H, Wayne Salcedo IV, MD, 25 mg at 01/16/25 2052    minocycline (MINOCIN,DYNACIN) capsule 100 mg, 100 mg, Oral, Q12H, Az Wasserman MD, 100 mg at 01/16/25 2052    mirtazapine (REMERON) tablet 15 mg, 15 mg, Oral, Nightly, Wojciech Stone DO    nitroglycerin (NITROSTAT) SL tablet 0.4 mg, 0.4 mg, Sublingual, Q5 Min PRN, Rosi Medina MD    pantoprazole (PROTONIX) EC tablet 40 mg, 40 mg, Oral, Nightly, Rosi Medina MD, 40 mg at 01/16/25 2052    Pharmacy Consult, , Not Applicable, Continuous PRGEORGE, Luis Lanier MD    Pharmacy Meds to Bed Consult, , Not Applicable, Daily, Radha Carballo APRN    phenol (CHLORASEPTIC) 1.4 % liquid 1 spray, 1 spray, Mouth/Throat, Q2H PRTerry VAZQUEZ Gabriela Kirk, MD    Phosphorus Replacement - Follow Nurse / BPA Driven Protocol, , Not Applicable, Carol ENNIS Muhammad, MD    Potassium Replacement - Follow Nurse / BPA Driven Protocol, , Not Applicable, Carol ENNIS Muhammad, MD    predniSONE (DELTASONE) tablet 20 mg, 20 mg, Oral, Daily With Breakfast, Luis Lanier MD, 20 mg at 01/16/25 0830    sodium chloride 0.9 % flush 10 mL, 10 mL, Intravenous, PRN, Rosi Medina MD    sodium chloride 0.9 % flush 10 mL, 10 mL, Intravenous, Q12H, Rosi Medina MD, 10 mL at 01/16/25 0831    sodium chloride 0.9 % flush 10 mL, 10 mL, Intravenous, PRN, Rosi Medina MD    sodium chloride 0.9 % infusion 40 mL, 40 mL, Intravenous, PRN, Rosi Medina MD    Antibiotics:  Anti-Infectives (From admission, onward)      Ordered     Dose/Rate Route Frequency Start Stop    01/15/25 0825  cefTRIAXone (ROCEPHIN) 2,000 mg in sodium  chloride 0.9 % 100 mL MBP        Ordering Provider: Az Wasserman MD    2,000 mg  200 mL/hr over 30 Minutes Intravenous Every 24 Hours 01/15/25 1000 25 0959    25 1011  minocycline (MINOCIN,DYNACIN) capsule 100 mg        Ordering Provider: Az Wasserman MD    100 mg Oral Every 12 Hours Scheduled 25 1100 25 0859    01/10/25 1534  doxycycline (MONODOX) capsule 100 mg  Status:  Discontinued        Ordering Provider: Anthony Hodges PA    100 mg Oral Every 12 Hours Scheduled 01/10/25 2100 25 1010    01/10/25 1534  cefTRIAXone (ROCEPHIN) 2,000 mg in sodium chloride 0.9 % 100 mL MBP  Status:  Discontinued        Ordering Provider: Anthony Hodges PA    2,000 mg  200 mL/hr over 30 Minutes Intravenous Every 24 Hours 01/10/25 1800 01/15/25 0824    25 2014  meropenem (MERREM) 1,000 mg in sodium chloride 0.9 % 100 mL MBP  Status:  Discontinued        Ordering Provider: Rosi Medina MD    1,000 mg  over 3 Hours Intravenous Every 8 Hours 01/10/25 0600 01/10/25 1534    25  Linezolid (ZYVOX) 600 mg 300 mL  Status:  Discontinued        Ordering Provider: Rosi Medina MD    600 mg  300 mL/hr over 60 Minutes Intravenous Every 12 Hours 01/10/25 0500 25 0846    25  meropenem (MERREM) 1,000 mg in sodium chloride 0.9 % 100 mL MBP        Ordering Provider: Rosi Medina MD    1,000 mg  over 30 Minutes Intravenous Once 01/10/25 0000 01/10/25 0153    25 1717  Linezolid (ZYVOX) 600 mg 300 mL        Ordering Provider: Ezequiel Eaton DO    600 mg  300 mL/hr over 60 Minutes Intravenous Once 25 1733 25 1843    25 1717  cefepime 2000 mg IVPB in 100 mL NS (MBP)        Ordering Provider: Ezequiel Eaton DO    2,000 mg  over 30 Minutes Intravenous Once 25 1733 25 1817              Review of Systems:  See HPI      Physical Exam:   Vital Signs  Temp (24hrs), Av.1 °F (36.7 °C), Min:97.6 °F (36.4 °C), Max:98.7  °F (37.1 °C)    Temp  Min: 97.6 °F (36.4 °C)  Max: 98.7 °F (37.1 °C)  BP  Min: 130/57  Max: 145/65  Pulse  Min: 88  Max: 116  Resp  Min: 18  Max: 18  SpO2  Min: 88 %  Max: 96 %    GENERAL: Ill and cachectic appearing.  HEENT: Normocephalic, atraumatic.  PERRL. EOMI. No conjunctival injection. No icterus. Oropharynx clear without evidence of thrush or exudate.    NECK: Supple   HEART: RRR; No murmur, rubs, gallops.   LUNGS:   Bilateral right greater than left crackles  ABDOMEN: Soft, nontender, nondistended. No rebound or guarding. NO mass or HSM.  EXT:  No cyanosis, clubbing or edema. No cord.  :  Without Ocasio catheter.  MSK: No joint effusions or erythema  SKIN: Warm and dry without cutaneous eruptions on Inspection/palpation.    NEURO: Oriented to PPT.  Motor 5/5 strength  PSYCHIATRIC: Normal insight and judgment. Cooperative with PE    Laboratory Data    Results from last 7 days   Lab Units 01/17/25  0413 01/16/25  0406 01/15/25  0417   WBC 10*3/mm3 26.73* 29.66* 21.94*   HEMOGLOBIN g/dL 9.8* 9.9* 10.1*   HEMATOCRIT % 30.2* 30.5* 31.4*   PLATELETS 10*3/mm3 1,106* 977* 866*     Results from last 7 days   Lab Units 01/17/25  0413   SODIUM mmol/L 128*   POTASSIUM mmol/L 3.8   CHLORIDE mmol/L 87*   CO2 mmol/L 29.0   BUN mg/dL 5*   CREATININE mg/dL 0.23*   GLUCOSE mg/dL 96   CALCIUM mg/dL 8.9     Results from last 7 days   Lab Units 01/11/25  1842   ALK PHOS U/L 434*   BILIRUBIN mg/dL <0.2   ALT (SGPT) U/L 51*   AST (SGOT) U/L 101*                               Estimated Creatinine Clearance: 184.6 mL/min (A) (by C-G formula based on SCr of 0.23 mg/dL (L)).      Microbiology:  Microbiology Results (last 10 days)       Procedure Component Value - Date/Time    Respiratory Panel PCR w/COVID-19(SARS-CoV-2) [facility]/BRONWYN/[facility]Z [facility] In-House, NP Swab in UTM/VTM, 2 HR TAT - Swab, Nasopharynx [[id number]]  (Normal) Collected: 01/16/25 1345    Lab Status: Final result Specimen: Swab from Nasopharynx Updated: 01/16/25 4136      ADENOVIRUS, PCR Not Detected     Coronavirus 229E Not Detected     Coronavirus HKU1 Not Detected     Coronavirus NL63 Not Detected     Coronavirus OC43 Not Detected     COVID19 Not Detected     Human Metapneumovirus Not Detected     Human Rhinovirus/Enterovirus Not Detected     Influenza A PCR Not Detected     Influenza B PCR Not Detected     Parainfluenza Virus 1 Not Detected     Parainfluenza Virus 2 Not Detected     Parainfluenza Virus 3 Not Detected     Parainfluenza Virus 4 Not Detected     RSV, PCR Not Detected     Bordetella pertussis pcr Not Detected     Bordetella parapertussis PCR Not Detected     Chlamydophila pneumoniae PCR Not Detected     Mycoplasma pneumo by PCR Not Detected    Narrative:      In the setting of a positive respiratory panel with a viral infection PLUS a negative procalcitonin without other underlying concern for bacterial infection, consider observing off antibiotics or discontinuation of antibiotics and continue supportive care. If the respiratory panel is positive for atypical bacterial infection (Bordetella pertussis, Chlamydophila pneumoniae, or Mycoplasma pneumoniae), consider antibiotic de-escalation to target atypical bacterial infection.    Beta Strep Culture, Throat - Swab, Throat [[id number]]  (Normal) Collected: 01/15/25 1333    Lab Status: Preliminary result Specimen: Swab from Throat Updated: 01/16/25 0813     Throat Culture, Beta Strep No Beta Hemolytic Streptococcus Isolated    Narrative:      Group A Strep incidence is low in adults. Positive culture for Beta hemolytic Streptococcus species can reflect colonization and not true infection. Please correlate clinically.    MRSA Screen, PCR (Inpatient) - Swab, Nares [[id number]]  (Normal) Collected: 01/10/25 1435    Lab Status: Final result Specimen: Swab from Nares Updated: 01/10/25 1607     MRSA PCR Negative    Narrative:      The negative predictive value of this diagnostic test is high and should only be used to consider  de-escalating anti-MRSA therapy. A positive result may indicate colonization with MRSA and must be correlated clinically.  MRSA Negative    S. Pneumo Ag Urine or CSF - Urine, Urine, Clean Catch [[id number]]  (Normal) Collected: 01/10/25 0729    Lab Status: Final result Specimen: Urine, Clean Catch Updated: 01/10/25 1353     Strep Pneumo Ag Negative    Legionella Antigen, Urine - Urine, Urine, Clean Catch [[id number]]  (Normal) Collected: 01/10/25 0729    Lab Status: Final result Specimen: Urine, Clean Catch Updated: 01/10/25 1353     LEGIONELLA ANTIGEN, URINE Negative    Respiratory Panel PCR w/COVID-19(SARS-CoV-2) [facility]/BRONWYN/[facility] In-House, NP Swab in UTM/VTM, 2 HR TAT - Swab, Nasopharynx [[id number]]  (Normal) Collected: 01/10/25 0133    Lab Status: Final result Specimen: Swab from Nasopharynx Updated: 01/10/25 0238     ADENOVIRUS, PCR Not Detected     Coronavirus 229E Not Detected     Coronavirus HKU1 Not Detected     Coronavirus NL63 Not Detected     Coronavirus OC43 Not Detected     COVID19 Not Detected     Human Metapneumovirus Not Detected     Human Rhinovirus/Enterovirus Not Detected     Influenza A PCR Not Detected     Influenza B PCR Not Detected     Parainfluenza Virus 1 Not Detected     Parainfluenza Virus 2 Not Detected     Parainfluenza Virus 3 Not Detected     Parainfluenza Virus 4 Not Detected     RSV, PCR Not Detected     Bordetella pertussis pcr Not Detected     Bordetella parapertussis PCR Not Detected     Chlamydophila pneumoniae PCR Not Detected     Mycoplasma pneumo by PCR Not Detected    Narrative:      In the setting of a positive respiratory panel with a viral infection PLUS a negative procalcitonin without other underlying concern for bacterial infection, consider observing off antibiotics or discontinuation of antibiotics and continue supportive care. If the respiratory panel is positive for atypical bacterial infection (Bordetella pertussis, Chlamydophila pneumoniae, or Mycoplasma  pneumoniae), consider antibiotic de-escalation to target atypical bacterial infection.                  Radiology:  Imaging Results (Last 72 Hours)       ** No results found for the last 72 hours. **         I read her radiographic images.      Impression:   Multifocal pneumonia- this is likely a bacterial pneumonia superimposed on her BONI.   I will discontinue intravenous Rocephin.  Mycobacterium avium complex pulmonary infection -since 2016,  with therapeutic noncompliance.  She has been rotating Levaquin, azithromycin, and rifampin d/t side effects.  She is not compliant with ID follow up. She is at high risk for developing a resistant BONI strain that will make it difficult to treat in the future.   Leukocytosis/neutrophilia-this is secondary to her steroid therapy.  End stage chronic obstructive pulmonary disease/on 3L O2 baseline at home  Protein calorie malnutrition  Penicillin list allergy.  Seems to tolerate cephalosporins.  Medical noncompliance-this complicates all aspects of her care and increases her risk for poor outcome.    Hyponatremia-mild    PLAN/RECOMMENDATIONS:   Discontinue Rocephin  Minocycline 100 mg p.o. twice daily x 5 days  discharge  Continue O2 support  Prevnar 20 pneumococcal vaccination, influenza vaccination, RSV vaccination, and COVID-19 vaccine booster in the near future.   I discussed this again with her today.  Infectious disease follow-up at -I have advised her to be compliant with her follow-up appointments  Discharge to home       At some point she is highly likely to significantly worsen with progressive pulmonary infiltrates due to her pulmonary BONI.  She has been noncompliant with therapy and soumya be likely to have antibiotic resistant organism infection/colonization from BONI.   Her overall prognosis is extremely poor.  If she develops progressive pulmonary infiltrates with worsening respiratory failure, she should be evaluated at .  She has previously been managed by   for her pulmonary BONI and her insurance will not permit me to manage her BONI.   I discussed her complex situation with Dr. Stone.   I will sign off.    This visit included the following complex service elements:  Complex medical decision-making associated with antimicrobial prescribing.  In-depth chart review with high level synthesis for complex diagnoses.  Managed infection treatment protocol associated with transitions of care for this complex patient.  Counseled patients, family members, and/or caregivers regarding antimicrobial stewardship and antibiotic resistance.      Az Wasserman MD  1/17/2025  08:34 EST

## 2025-01-17 NOTE — PROGRESS NOTES
INFECTIOUS DISEASE Progress Note    Kristen Jean  1960  [id number]    Admission Date: 1/9/2025      Requesting Provider: Rosi Medina MD  Evaluating Physician: Az Wasserman MD    Reason for Consultation: Multifocal pneumonia with h/o MAC on chronic therapy    History of present illness:     1/10/2025:Patient is a 64 y.o. female with pulmonary Mycobacterium avium infection 2015/on chronic therapy, ES COPD/3L O2 baseline, and protein calorie malnutrition who presented to [facility] ED on 1/9/25 with worsening shortness of breath with right pleuritic chest pain, and higher oxygen requirements over the last week prior to admission. About 2 days ago, the patient bent over and she felt like she tore something in her right lower chest.  She quit smoking 11/2024, but all her neighbors smoke in the apartment complex.  The past week, she has needed 5-6 L/min O2 and is still satting at 93% on this oxygen setting.  She follow pulmonology and ID at [facility].  She has been noncompliant with her antibiotics for BONI and rotating taking Levaquin, Azithromycin and Rifampin because of complaints of side effects.   She denies fever, chills, nausea, vomiting, diarrhea, or dysuria.  She has remained afebrile with initial tachycardia. Initial labs were , PCT 0.09, WBC 16,700 with 77% neutrophils, Na 127, K 3.4, creatinine 0.31, CRP 13.13, and CPK 52.  A respiratory panel PCR was negative.  Urinary antigens for Strep pneumo and Legionella were negative. Nasal MRSA PCR is negative. A CT scan of chest without contrast showed right lung multifocal pneumonia, decrease in size of RLL pulmonary nodule.  She is currently on Merrem and Zyvox.  ID was asked to evaluate and manage her antibiotic therapy.    She has been noncompliant with follow-up.  Her last ID visit at  was in 5/24.  She has been noncompliant with her azithromycin, rifampin, and Levaquin as she takes these medications intermittently.  She states that she  has not received an RSV vaccine, Prevnar 20 pneumococcal vaccine, influenza vaccine, or COVID-19 vaccine booster     1/11/2025:  She has remained afebrile.  White blood cell count is 13.9.   MRSA nasal PCR was negative.  Urinary Legionella and pneumococcal antigens were negative.  O2 saturation is 90% on 3 L.    He complains of pleuritic right chest pain.  She complains of dyspnea.  She has minimal sputum production.   she refused taking the doxycycline due to prior nausea on doxycycline.  She is agreeable to trying minocycline.  She will need to take the minocycline with food but not any calcium containing food within 1 to 2 hours of the capsules.  I discussed this issue with her today in detail     1/12/2025: She remains afebrile. White blood cell count is 11.6.   She feels better today.  She has decreased right pleuritic chest pain.  She denies increased cough and sputum production.  She denies nausea vomiting. She denies increased dyspnea.  She is tolerating the minocycline with no nausea.     1/13/2025:   She remains afebrile.  White blood cell count is 12.9.  Creatinine is 0.26.  Sodium is 131.  She is on 3 L of oxygen with an O2 saturation of 94%.   She continues to feel better.  Her right pleuritic chest pain is resolved.  She denies increased dyspnea and cough.  She denies hemoptysis    1/14/2025:  She remains afebrile.  Creatinine is 0.22.  White blood cell count is 17.1.  She is on prednisone at 20 mg/day. .  O2 saturation is 95% on 4 L.   She complains of some residual right pleuritic chest pain.  She has a cough with minimal sputum production.    1/15/24:  She remains afebrile.   White blood cell count is 21.9. and Rocephin dose is currently scheduled for 1800.  She is currently on 5 L of oxygen with an O2 saturation of 93%.   She is on 20 mg of prednisone per day.  She feels partially improved.  She does not feel ready to go home today and wants to wait until tomorrow.    1/16/2024: She had a low-grade  fever to 100.5 yesterday.  Since then she has been afebrile.  A strep throat swab was sent yesterday which is pending.  White blood cell count is 29.7.   She complains of malaise and fatigue.  She denies increased sputum production.  She would like to disposition to a subacute nursing facility     Past Medical History:   Diagnosis Date    Anxiety disorder 01/09/2025    Asthma-COPD overlap syndrome 01/09/2025    Breast injury     Cachexia 01/09/2025    Chronic bronchitis 08/11/2016    Emphysema of lung 01/09/2025    Hypothyroidism (acquired) 01/09/2025    Mycobacterium infection 08/11/2016    T2DM (type 2 diabetes mellitus) 01/09/2025       Past Surgical History:   Procedure Laterality Date    TOOTH EXTRACTION      1990 and 2002       Family History   Problem Relation Age of Onset    Dementia Father     Breast cancer Neg Hx     Ovarian cancer Neg Hx        Social History     Socioeconomic History    Marital status: Single   Tobacco Use    Smoking status: Every Day   Vaping Use    Vaping status: Never Used   Substance and Sexual Activity    Alcohol use: No    Drug use: Never    Sexual activity: Defer       Allergies   Allergen Reactions    Codeine     Fluticasone-Salmeterol     Penicillins          Medication:    Current Facility-Administered Medications:     !!! Please obtain patients home medications that are stored in central pharmacy (including Breo inhaler in [facility]) and return to patient prior to discharge!, , Not Applicable, BID, Nicole Ch, PharmD, Given at 01/10/25 2337    acetaminophen (TYLENOL) tablet 650 mg, 650 mg, Oral, Q4H PRN, 650 mg at 01/15/25 1747 **OR** acetaminophen (TYLENOL) 160 MG/5ML oral solution 650 mg, 650 mg, Oral, Q4H PRN **OR** acetaminophen (TYLENOL) suppository 650 mg, 650 mg, Rectal, Q4H PRN, Rosi Medina MD    albuterol (PROVENTIL) nebulizer solution 0.083% 2.5 mg/3mL, 2.5 mg, Nebulization, Q4H PRN, Luis Lanier MD, 2.5 mg at 01/16/25 4002    apixaban (ELIQUIS)  tablet 5 mg, 5 mg, Oral, Q12H, Wayne Salcedo IV, MD, 5 mg at 01/16/25 2052    benzonatate (TESSALON) capsule 100 mg, 100 mg, Oral, TID PRN, Rosi Medina MD, 100 mg at 01/15/25 2108    sennosides-docusate (PERICOLACE) 8.6-50 MG per tablet 2 tablet, 2 tablet, Oral, BID PRN **AND** polyethylene glycol (MIRALAX) packet 17 g, 17 g, Oral, Daily PRN **AND** bisacodyl (DULCOLAX) EC tablet 5 mg, 5 mg, Oral, Daily PRN **AND** bisacodyl (DULCOLAX) suppository 10 mg, 10 mg, Rectal, Daily PRN, oRsi Medina MD    Calcium Replacement - Follow Nurse / BPA Driven Protocol, , Not Applicable, PRN, Rosi Medina MD    cefTRIAXone (ROCEPHIN) 2,000 mg in sodium chloride 0.9 % 100 mL MBP, 2,000 mg, Intravenous, Q24H, Az Wasserman MD, Last Rate: 200 mL/hr at 01/16/25 0831, 2,000 mg at 01/16/25 0831    dilTIAZem CD (CARDIZEM CD) 24 hr capsule 180 mg, 180 mg, Oral, Q24H, Sherry Darden, APRN, 180 mg at 01/15/25 0839    Fluticasone Furoate-Vilanterol (BREO ELLIPTA) 200-25 MCG/ACT inhaler 1 puff **Patient Supplied Med**, 1 puff, Inhalation, Daily - RT, Dorian Sinha, RPH, 1 puff at 01/16/25 2105    [place] Cough Drops (lozenges), 1 lozenge, Buccal, Q2H PRN, Marlen Blevins PA-C, 1 lozenge at 01/11/25 1754    Hydrocod Varun-Chlorphe Varun ER (TUSSIONEX PENNKINETIC) 10-8 MG/5ML ER suspension 2.5 mL, 2.5 mL, Oral, Q12H PRN, Wojciech Stone DO, 2.5 mL at 01/16/25 1122    ipratropium-albuterol (DUO-NEB) nebulizer solution 3 mL, 3 mL, Nebulization, 4x Daily - RT, Rosi Medina MD, 3 mL at 01/17/25 0707    lactated ringers bolus 500 mL, 500 mL, Intravenous, Once, Rosi Medina MD    LORazepam (ATIVAN) tablet 0.25 mg, 0.25 mg, Oral, Q6H PRN, Franchesca Stewart MD, 0.25 mg at 01/17/25 0615    Magnesium Standard Dose Replacement - Follow Nurse / BPA Driven Protocol, , Not Applicable, PRN, Rosi Medina MD    melatonin tablet 5 mg, 5 mg, Oral, Nightly, Rosi Medina MD, 5 mg at 01/16/25 6482     metoprolol tartrate (LOPRESSOR) tablet 25 mg, 25 mg, Oral, Q12H, Wayne Salcedo IV, MD, 25 mg at 01/16/25 2052    minocycline (MINOCIN,DYNACIN) capsule 100 mg, 100 mg, Oral, Q12H, Az Wasserman MD, 100 mg at 01/16/25 2052    mirtazapine (REMERON) tablet 15 mg, 15 mg, Oral, Nightly, Wojciech Stone DO    nitroglycerin (NITROSTAT) SL tablet 0.4 mg, 0.4 mg, Sublingual, Q5 Min PRN, Rosi Medina MD    pantoprazole (PROTONIX) EC tablet 40 mg, 40 mg, Oral, Nightly, Rosi Medina MD, 40 mg at 01/16/25 2052    Pharmacy Consult, , Not Applicable, Continuous PRN, Luis Lanier MD    Pharmacy Meds to Bed Consult, , Not Applicable, Daily, Radha Carballo APRN    phenol (CHLORASEPTIC) 1.4 % liquid 1 spray, 1 spray, Mouth/Throat, Q2H PRN, Franchesca Stewart MD    Phosphorus Replacement - Follow Nurse / BPA Driven Protocol, , Not Applicable, PRCarol VAZQUEZ Muhammad, MD    Potassium Replacement - Follow Nurse / BPA Driven Protocol, , Not Applicable, PRCarol VAZQUEZ Muhammad, MD    predniSONE (DELTASONE) tablet 20 mg, 20 mg, Oral, Daily With Breakfast, Luis Lanier MD, 20 mg at 01/16/25 0830    sodium chloride 0.9 % flush 10 mL, 10 mL, Intravenous, PRN, Rosi Medina MD    sodium chloride 0.9 % flush 10 mL, 10 mL, Intravenous, Q12H, Rosi Medina MD, 10 mL at 01/16/25 0831    sodium chloride 0.9 % flush 10 mL, 10 mL, Intravenous, PRN, Rosi Medina MD    sodium chloride 0.9 % infusion 40 mL, 40 mL, Intravenous, PRN, Rosi Medina MD    Antibiotics:  Anti-Infectives (From admission, onward)      Ordered     Dose/Rate Route Frequency Start Stop    01/15/25 0825  cefTRIAXone (ROCEPHIN) 2,000 mg in sodium chloride 0.9 % 100 mL MBP        Ordering Provider: Az Wasserman MD    2,000 mg  200 mL/hr over 30 Minutes Intravenous Every 24 Hours 01/15/25 1000 01/25/25 0959    01/11/25 1011  minocycline (MINOCIN,DYNACIN) capsule 100 mg        Ordering Provider: Az Wasserman MD     100 mg Oral Every 12 Hours Scheduled 25 1100 25 0859    01/10/25 1534  doxycycline (MONODOX) capsule 100 mg  Status:  Discontinued        Ordering Provider: Anthony Hodges PA    100 mg Oral Every 12 Hours Scheduled 01/10/25 2100 25 1010    01/10/25 1534  cefTRIAXone (ROCEPHIN) 2,000 mg in sodium chloride 0.9 % 100 mL MBP  Status:  Discontinued        Ordering Provider: Anthony Hodges PA    2,000 mg  200 mL/hr over 30 Minutes Intravenous Every 24 Hours 01/10/25 1800 01/15/25 0824    25 2014  meropenem (MERREM) 1,000 mg in sodium chloride 0.9 % 100 mL MBP  Status:  Discontinued        Ordering Provider: Rosi Medina MD    1,000 mg  over 3 Hours Intravenous Every 8 Hours 01/10/25 0600 01/10/25 1534    25  Linezolid (ZYVOX) 600 mg 300 mL  Status:  Discontinued        Ordering Provider: Rosi Medina MD    600 mg  300 mL/hr over 60 Minutes Intravenous Every 12 Hours 01/10/25 0500 25 0846    25  meropenem (MERREM) 1,000 mg in sodium chloride 0.9 % 100 mL MBP        Ordering Provider: Rosi Medina MD    1,000 mg  over 30 Minutes Intravenous Once 01/10/25 0000 01/10/25 0153    25 1717  Linezolid (ZYVOX) 600 mg 300 mL        Ordering Provider: Ezequiel Eaton DO    600 mg  300 mL/hr over 60 Minutes Intravenous Once 25 1733 25 1843    25 1717  cefepime 2000 mg IVPB in 100 mL NS (MBP)        Ordering Provider: Ezequiel Eaton DO    2,000 mg  over 30 Minutes Intravenous Once 25 1733 25 1817              Review of Systems:  See HPI      Physical Exam:   Vital Signs  Temp (24hrs), Av.1 °F (36.7 °C), Min:97.6 °F (36.4 °C), Max:98.7 °F (37.1 °C)    Temp  Min: 97.6 °F (36.4 °C)  Max: 98.7 °F (37.1 °C)  BP  Min: 130/57  Max: 145/65  Pulse  Min: 88  Max: 116  Resp  Min: 18  Max: 18  SpO2  Min: 88 %  Max: 96 %    GENERAL: Ill and cachectic appearing.  HEENT: Normocephalic, atraumatic.  PERRL. EOMI. No  conjunctival injection. No icterus. Oropharynx clear without evidence of thrush or exudate.    NECK: Supple   HEART: RRR; No murmur, rubs, gallops.   LUNGS:   Bilateral right greater than left crackles  ABDOMEN: Soft, nontender, nondistended. No rebound or guarding. NO mass or HSM.  EXT:  No cyanosis, clubbing or edema. No cord.  :  Without Ocasio catheter.  MSK: No joint effusions or erythema  SKIN: Warm and dry without cutaneous eruptions on Inspection/palpation.    NEURO: Oriented to PPT.  Motor 5/5 strength  PSYCHIATRIC: Normal insight and judgment. Cooperative with PE    Laboratory Data    Results from last 7 days   Lab Units 01/17/25  0413 01/16/25  0406 01/15/25  0417   WBC 10*3/mm3 26.73* 29.66* 21.94*   HEMOGLOBIN g/dL 9.8* 9.9* 10.1*   HEMATOCRIT % 30.2* 30.5* 31.4*   PLATELETS 10*3/mm3 1,106* 977* 866*     Results from last 7 days   Lab Units 01/17/25  0413   SODIUM mmol/L 128*   POTASSIUM mmol/L 3.8   CHLORIDE mmol/L 87*   CO2 mmol/L 29.0   BUN mg/dL 5*   CREATININE mg/dL 0.23*   GLUCOSE mg/dL 96   CALCIUM mg/dL 8.9     Results from last 7 days   Lab Units 01/11/25  1842   ALK PHOS U/L 434*   BILIRUBIN mg/dL <0.2   ALT (SGPT) U/L 51*   AST (SGOT) U/L 101*                               Estimated Creatinine Clearance: 184.6 mL/min (A) (by C-G formula based on SCr of 0.23 mg/dL (L)).      Microbiology:  Microbiology Results (last 10 days)       Procedure Component Value - Date/Time    Respiratory Panel PCR w/COVID-19(SARS-CoV-2) [facility]WYN/[facility] In-House, NP Swab in UTM/VTM, 2 HR TAT - Swab, Nasopharynx [[id number]]  (Normal) Collected: 01/16/25 3375    Lab Status: Final result Specimen: Swab from Nasopharynx Updated: 01/16/25 1452     ADENOVIRUS, PCR Not Detected     Coronavirus 229E Not Detected     Coronavirus HKU1 Not Detected     Coronavirus NL63 Not Detected     Coronavirus OC43 Not Detected     COVID19 Not Detected     Human Metapneumovirus Not Detected     Human Rhinovirus/Enterovirus Not  Detected     Influenza A PCR Not Detected     Influenza B PCR Not Detected     Parainfluenza Virus 1 Not Detected     Parainfluenza Virus 2 Not Detected     Parainfluenza Virus 3 Not Detected     Parainfluenza Virus 4 Not Detected     RSV, PCR Not Detected     Bordetella pertussis pcr Not Detected     Bordetella parapertussis PCR Not Detected     Chlamydophila pneumoniae PCR Not Detected     Mycoplasma pneumo by PCR Not Detected    Narrative:      In the setting of a positive respiratory panel with a viral infection PLUS a negative procalcitonin without other underlying concern for bacterial infection, consider observing off antibiotics or discontinuation of antibiotics and continue supportive care. If the respiratory panel is positive for atypical bacterial infection (Bordetella pertussis, Chlamydophila pneumoniae, or Mycoplasma pneumoniae), consider antibiotic de-escalation to target atypical bacterial infection.    Beta Strep Culture, Throat - Swab, Throat [[id number]]  (Normal) Collected: 01/15/25 1333    Lab Status: Preliminary result Specimen: Swab from Throat Updated: 01/16/25 0813     Throat Culture, Beta Strep No Beta Hemolytic Streptococcus Isolated    Narrative:      Group A Strep incidence is low in adults. Positive culture for Beta hemolytic Streptococcus species can reflect colonization and not true infection. Please correlate clinically.    MRSA Screen, PCR (Inpatient) - Swab, Nares [[id number]]  (Normal) Collected: 01/10/25 1435    Lab Status: Final result Specimen: Swab from Nares Updated: 01/10/25 1607     MRSA PCR Negative    Narrative:      The negative predictive value of this diagnostic test is high and should only be used to consider de-escalating anti-MRSA therapy. A positive result may indicate colonization with MRSA and must be correlated clinically.  MRSA Negative    S. Pneumo Ag Urine or CSF - Urine, Urine, Clean Catch [[id number]]  (Normal) Collected: 01/10/25 0729    Lab Status: Final  result Specimen: Urine, Clean Catch Updated: 01/10/25 1353     Strep Pneumo Ag Negative    Legionella Antigen, Urine - Urine, Urine, Clean Catch [[id number]]  (Normal) Collected: 01/10/25 0729    Lab Status: Final result Specimen: Urine, Clean Catch Updated: 01/10/25 1353     LEGIONELLA ANTIGEN, URINE Negative    Respiratory Panel PCR w/COVID-19(SARS-CoV-2) [facility] In-House, NP Swab in UTM/VTM, 2 HR TAT - Swab, Nasopharynx [[id number]]  (Normal) Collected: 01/10/25 0133    Lab Status: Final result Specimen: Swab from Nasopharynx Updated: 01/10/25 0238     ADENOVIRUS, PCR Not Detected     Coronavirus 229E Not Detected     Coronavirus HKU1 Not Detected     Coronavirus NL63 Not Detected     Coronavirus OC43 Not Detected     COVID19 Not Detected     Human Metapneumovirus Not Detected     Human Rhinovirus/Enterovirus Not Detected     Influenza A PCR Not Detected     Influenza B PCR Not Detected     Parainfluenza Virus 1 Not Detected     Parainfluenza Virus 2 Not Detected     Parainfluenza Virus 3 Not Detected     Parainfluenza Virus 4 Not Detected     RSV, PCR Not Detected     Bordetella pertussis pcr Not Detected     Bordetella parapertussis PCR Not Detected     Chlamydophila pneumoniae PCR Not Detected     Mycoplasma pneumo by PCR Not Detected    Narrative:      In the setting of a positive respiratory panel with a viral infection PLUS a negative procalcitonin without other underlying concern for bacterial infection, consider observing off antibiotics or discontinuation of antibiotics and continue supportive care. If the respiratory panel is positive for atypical bacterial infection (Bordetella pertussis, Chlamydophila pneumoniae, or Mycoplasma pneumoniae), consider antibiotic de-escalation to target atypical bacterial infection.                  Radiology:  Imaging Results (Last 72 Hours)       ** No results found for the last 72 hours. **         I read her radiographic images.      Impression:    Multifocal pneumonia- this is likely a bacterial pneumonia superimposed on her BONI.  I will leave her on intravenous Rocephin and oral minocycline.  Mycobacterium avium complex pulmonary infection -since 2016,  with therapeutic noncompliance.  She has been rotating Levaquin, azithromycin, and rifampin d/t side effects.  She is not compliant with ID follow up. She is at high risk for developing a resistant BONI strain that will make it difficult to treat in the future.   Leukocytosis/neutrophilia-this is secondary to her steroid therapy.  End stage chronic obstructive pulmonary disease/on 3L O2 baseline at home  Protein calorie malnutrition  Penicillin list allergy.  Seems to tolerate cephalosporins.  Medical noncompliance-this complicates all aspects of her care and increases her risk for poor outcome.    Hyponatremia-mild    PLAN/RECOMMENDATIONS:   Rocephin 2 gm IV daily  Minocycline 100 mg p.o. twice daily  Continue O2 support  Prevnar 20 pneumococcal vaccination, influenza vaccination, RSV vaccination, and COVID-19 vaccine booster in the near future  Infectious disease follow-up at -I have advised her to be compliant with her follow-up appointments  Discharge to home versus a subacute nursing facility        Az Wasserman MD  1/17/2025  08:33 EST

## 2025-01-17 NOTE — PROGRESS NOTES
[facility] Medicine Services  PROGRESS NOTE    Patient Name: Kristen Jean  : 1960  MRN: [id number]    Date of Admission: 2025  Primary Care Physician: Moiz Nova MD    Subjective   Subjective     CC:  Shortness of breath    HPI:  Patient seen resting in bedside chair in no acute distress.  Reports continued improvement in symptoms, though mild.  Near baseline oxygen at 3.5 L nasal cannula today, baseline oxygen at 3 L nasal cannula.      Objective   Objective     Vital Signs:   Temp:  [97.6 °F (36.4 °C)-98.3 °F (36.8 °C)] 98.3 °F (36.8 °C)  Heart Rate:  [] 87  Resp:  [18] 18  BP: (113-145)/(57-66) 113/66  Flow (L/min) (Oxygen Therapy):  [3-4] 3.5     Physical Exam  Constitutional:       General: She is not in acute distress.  Cardiovascular:      Rate and Rhythm: Normal rate.      Pulses: Normal pulses.   Pulmonary:      Effort: Pulmonary effort is normal. No respiratory distress.      Comments: Diminished breath sounds throughout  Abdominal:      General: There is no distension.      Palpations: Abdomen is soft.      Tenderness: There is no abdominal tenderness. There is no guarding or rebound.   Musculoskeletal:      Right lower leg: No edema.      Left lower leg: No edema.   Skin:     General: Skin is warm.   Neurological:      Mental Status: She is alert and oriented to person, place, and time.   Psychiatric:         Mood and Affect: Mood normal.         Behavior: Behavior normal.          Results Reviewed:  LAB RESULTS:      Lab 25  0413 25  0406 01/15/25  0417 25  0427 25  0344 25  0554 25  2135 25  1859 25  1842   WBC 26.73* 29.66* 21.94* 17.07* 12.92*   < >  --    < >  --    HEMOGLOBIN 9.8* 9.9* 10.1* 9.8* 9.1*   < >  --    < >  --    HEMATOCRIT 30.2* 30.5* 31.4* 30.6* 28.1*   < >  --    < >  --    PLATELETS 1,106* 977* 866* 816* 727*   < >  --    < >  --    NEUTROS ABS 22.10* 24.59* 16.61* 12.23* 8.84*   <  >  --   --   --    IMMATURE GRANS (ABS) 0.43* 0.42* 0.37* 0.30* 0.21*   < >  --   --   --    LYMPHS ABS 1.43 1.97 2.49 2.28 1.95   < >  --   --   --    MONOS ABS 1.44* 1.43* 1.31* 1.32* 1.18*   < >  --   --   --    EOS ABS 1.23* 1.16* 1.07* 0.88* 0.69*   < >  --   --   --    MCV 81.2 81.6 82.6 82.5 81.9   < >  --    < >  --    HSTROP T  --   --   --   --   --   --  14*  --  12    < > = values in this interval not displayed.         Lab 01/17/25  0413 01/16/25  0406 01/15/25  0417 01/14/25  0427 01/13/25  0344 01/12/25  0555 01/11/25  1842 01/11/25  0408   SODIUM 128* 129* 131* 130* 131* 134* 132* 129*   POTASSIUM 3.8 4.0 4.2 4.1 4.0 4.2 4.6  4.6 3.1*   CHLORIDE 87* 91* 93* 93* 95* 98 97* 93*   CO2 29.0 30.0* 33.0* 31.0* 30.0* 29.0 25.0 27.0   ANION GAP 12.0 8.0 5.0 6.0 6.0 7.0 10.0 9.0   BUN 5* 5* 5* 4* 4* 3* 3* 3*   CREATININE 0.23* 0.24* 0.26* 0.22* 0.26* 0.19* 0.26* 0.24*   EGFR 127.3 126.0 123.6 128.6 123.6 133.3 123.6 126.0   GLUCOSE 96 115* 105* 93 98 99 115* 116*   CALCIUM 8.9 8.4* 9.2 9.1 8.5* 8.8 8.8 8.6   MAGNESIUM  --   --   --  2.1 2.0 2.2 1.8 1.8   PHOSPHORUS  --   --   --   --   --  3.6 1.8*  --          Lab 01/11/25  1842   TOTAL PROTEIN 5.9*   ALBUMIN 3.0*   GLOBULIN 2.9   ALT (SGPT) 51*   AST (SGOT) 101*   BILIRUBIN <0.2   ALK PHOS 434*         Lab 01/11/25  2135 01/11/25  1842   HSTROP T 14* 12               Lab 01/17/25  0413   IRON 14*   IRON SATURATION (TSAT) 8*   TIBC 176*   TRANSFERRIN 118*   FERRITIN 109.00         Brief Urine Lab Results  (Last result in the past 365 days)        Color   Clarity   Blood   Leuk Est   Nitrite   Protein   CREAT   Urine HCG        01/10/25 0730 Yellow   Clear   Negative   Negative   Negative   Negative                   Microbiology Results Abnormal       None            No radiology results from the last 24 hrs    Results for orders placed during the hospital encounter of 01/09/25    Adult Transthoracic Echo Complete W/ Cont if Necessary Per  Protocol    Interpretation Summary    Left ventricular systolic function is hyperdynamic (EF > 70%).    No hemodynamically significant valvular disease present.    The right ventricular cavity is mildly dilated.    Estimated right ventricular systolic pressure from tricuspid regurgitation is normal (<35 mmHg).    There is a left pleural effusion.      Current medications:  Scheduled Meds:Pharmacy Consult, , Not Applicable, BID  apixaban, 5 mg, Oral, Q12H  dilTIAZem CD, 180 mg, Oral, Q24H  Fluticasone Furoate-Vilanterol, 1 puff, Inhalation, Daily - RT  ipratropium-albuterol, 3 mL, Nebulization, 4x Daily - RT  lactated ringers, 500 mL, Intravenous, Once  melatonin, 5 mg, Oral, Nightly  metoprolol tartrate, 25 mg, Oral, Q12H  minocycline, 100 mg, Oral, Q12H  mirtazapine, 15 mg, Oral, Nightly  pantoprazole, 40 mg, Oral, Nightly  Pharmacy Meds to Bed Consult, , Not Applicable, Daily  predniSONE, 20 mg, Oral, Daily With Breakfast  sodium chloride, 10 mL, Intravenous, Q12H      Continuous Infusions:Pharmacy Consult,       PRN Meds:.  acetaminophen **OR** acetaminophen **OR** acetaminophen    Albuterol Sulfate NEB Orderable    benzonatate    senna-docusate sodium **AND** polyethylene glycol **AND** bisacodyl **AND** bisacodyl    Calcium Replacement - Follow Nurse / BPA Driven Protocol    Halls Cough Drops    Hydrocod Varun-Chlorphe Varun ER    LORazepam    Magnesium Standard Dose Replacement - Follow Nurse / BPA Driven Protocol    nitroglycerin    Pharmacy Consult    phenol    Phosphorus Replacement - Follow Nurse / BPA Driven Protocol    Potassium Replacement - Follow Nurse / BPA Driven Protocol    sodium chloride    sodium chloride    sodium chloride    Assessment & Plan   Assessment & Plan     Active Hospital Problems    Diagnosis  POA    **Multifocal pneumonia [J18.9]  Yes    Atrial tachycardia [I47.19]  No    Severe protein-calorie malnutrition [E43]  Yes    Atrial fibrillation/flutter [I48.91]  Yes    Heart failure with  improved ejection fraction (HFimpEF) [I50.32]  Yes    Aortic atherosclerosis [I70.0]  Yes    Emphysema of lung [J43.9]  Yes    Asthma-COPD overlap syndrome [J44.89]  Yes    Anxiety disorder [F41.9]  Yes    Hyponatremia [E87.1]  Yes    Hypokalemia [E87.6]  Yes    Protein calorie malnutrition [E46]  Yes    Cachexia [R64]  Yes    End stage COPD [J44.9]  Yes    Hypothyroidism (acquired) [E03.9]  Yes    Mycobacterium avium complex [A31.0]  Yes    Chronic bronchitis [J42]  Yes    Mycobacterium infection [A31.9]  Yes      Resolved Hospital Problems    Diagnosis Date Resolved POA    Pneumonia [J18.9] 01/11/2025 Yes    T2DM (type 2 diabetes mellitus) [E11.9] 01/11/2025 Yes        Brief Hospital Course to date:  Kristen Jean is a 64 y.o. female with a PMH significant for pulmonary Mycobacterium avium complex diagnosed 2015, end-stage COPD, anxiety and protein calorie malnutrition with cachexia who presented to [facility] ED secondary to worsening shortness of breath, higher oxygen requirements, right-sided pleuritic chest pain, was found to have multifocal PNA.  Pulmonology consulted and recommended extended steroid course.  Infectious is consulted and assisting with antibiotic therapy.  Patient on Rocephin and minocycline.  Patient will continue p.o. minocycline at this time.     Pneumonia  Multifocal pneumonia  Mycobacterium avium complex  End-stage COPD  Asthma - COPD overlap syndrome  Emphysema of the lung  Chronic bronchitis  - Patient has a history that is significant for advanced COPD with a history of asthma - COPD overlap syndrome, follows [facility] pulmonology  - Does have a history of Mycobacterium AVM complex, appears on CT that her previous RLL nodule has decreased in size  - CT chest without contrast revealed multifocal pneumonia more pronounced RML/RLL  - MRSA swab, streptococcal Legionella urine antigens negative, respiratory panel negative  -Consulted, initially on Rocephin and minocycline.   Will discontinue Rocephin and continue minocycline for 10 days.  Patient will need to follow-up with infectious disease at [facility].  -Pulmonology consulted, appreciate recs.  Recommended Breo inhaler, scheduled DuoNebs, scheduled albuterol nebs, and extended course of steroids.  - DuoNebs, Tessalon Perles and albuterol inhaler. Continue steroids.   - Continue nasal cannula oxygen and wean as tolerated     Hyponatremia  Hypokalemia  --Suspect SIADH in the setting of pneumonia versus hypovolemic hyponatremia  - s/p IVFs  -- Na+ stable  - Potassium replacement protocol in place    Thrombocytosis  -Hematology consulted  -Obtaining JAK2 mutation     New Onset Afib with RVR  ?SVT  -- consult Cardiology given new diagnosis, appreciate their assistance  -- started on Eliquis, [id number] 2  -- given IV digoxin but remained tachycardic   -- EKG repeated and concern for SVT   -- pt refused IV Adenosine initially, however, eventually agreed. Briefly HR was better, but then went back to 160s.  Resumed dilt gtt at that point.   -- now off dilt gtt and in NSR  -- continue with Metoprolol and PO Diltiazem as per Cardiology  -- TTE with normal EF      Protein calorie malnutrition  Cachexia of end-stage lung disease  --Patient has low albumin, appears cachectic likely secondary to advanced lung disease  - Nutrition consult for assessment of malnutrition  --Will consider initiation of appetite stimulant     Goals of care  -- pt still full code for time being.  Will continue to discuss with her given her poor overall prognosis      Low TSH  -- Likely subclinical thyroiditis with low TSH and normal free T4, in the setting of pneumonia  -- Will need repeat of thyroid function in 6 to 8 weeks.     T2DM/prediabetes   HbA1c 6%   LDL 88     Anxiety  --  reordered her benzo, she requested 0.25 mg QID (vs 0.5 mg BID at home).      Expected Discharge Location and Transportation: TBD  Expected Discharge   Expected Discharge Date:  1/16/2025; Expected Discharge Time:      VTE Prophylaxis:  Pharmacologic & mechanical VTE prophylaxis orders are present.         AM-PAC 6 Clicks Score (PT): 19 (01/17/25 3602)    CODE STATUS:   Code Status and Medical Interventions: CPR (Attempt to Resuscitate); Full Support   Ordered at: 01/09/25 2009     Level Of Support Discussed With:    Patient     Code Status (Patient has no pulse and is not breathing):    CPR (Attempt to Resuscitate)     Medical Interventions (Patient has pulse or is breathing):    Full Support       Wojciech Stone DO  01/17/25

## 2025-01-17 NOTE — PLAN OF CARE
Goal Outcome Evaluation:  Plan of Care Reviewed With: patient        Progress: improving  Outcome Evaluation: Pt progressing toward ADL goals, this date completed BSC transfer with SUP, SUP for toileting tasks in standing, ambulated short distances in room with CGA/no AD. Pt's O2 sat. 89% on 3.5L following self-care activity with rest breaks needed throughout for recovery. Recommend home with assist and HHOT at discharge.    Anticipated Discharge Disposition (OT): home with assist, home with home health

## 2025-01-17 NOTE — PLAN OF CARE
Goal Outcome Evaluation:  Plan of Care Reviewed With: patient        Progress: no change  Outcome Evaluation: Pt continues to present with decreased functional mobility and decreased activity tolerance compared to baseline. Pt ambulated 20ft with CGA, unsupported. Multiple LOB noted during session. Continue to progress per pt tolerance.    Anticipated Discharge Disposition (PT): inpatient rehabilitation facility

## 2025-01-17 NOTE — CONSULTS
HEMATOLOGY/ONCOLOGY INPATIENT CONSULT    REASON FOR CONSULT: Thrombocytosis    Subjective   HISTORY OF PRESENT ILLNESS;   Ms. Jean is a 64-year-old lady with past medical history of asthma/COPD, hypothyroidism, chronic Mycobacterium infection, type 2 diabetes, anxiety, questionable history of polycythemia vera/essential thrombocytosis who presented to [facility] with worsening chest pain and shortness of breath.  Patient notes a chronic history of smoking and is followed by pulmonology for COPD/asthma as well as a chronic Mycobacterium infection.  She started to have worsening shortness of breath last week and subsequently presented to the ER where she was found to have a multifocal pneumonia on CT scan.  During her hospitalization, she has been on antibiotic therapy as well as nasal cannula supplementation.  Breathing improving however her CBC has been notable for a rising thrombocytosis initially beginning at 493K and now rising to 1106K.  She states that she was diagnosed with polycythemia vera/essential thrombocytosis in the early 2000's in [place].  Notes that she has had a history of therapeutic phlebotomies but has not had one in some time.  Is not currently on a baby aspirin therapy.  It is uncertain if she was JAK2 mutational positive for another mutation positive.  She is on apixaban for her history of atrial fibrillation.  She is overall doing okay today and has no other major concerns or complaints      Past Medical History:   Diagnosis Date    Anxiety disorder 01/09/2025    Asthma-COPD overlap syndrome 01/09/2025    Breast injury     Cachexia 01/09/2025    Chronic bronchitis 08/11/2016    Emphysema of lung 01/09/2025    Hypothyroidism (acquired) 01/09/2025    Mycobacterium infection 08/11/2016    T2DM (type 2 diabetes mellitus) 01/09/2025     Past Surgical History:   Procedure Laterality Date    TOOTH EXTRACTION      1990 and 2002       No current facility-administered medications on file  "prior to encounter.     Current Outpatient Medications on File Prior to Encounter   Medication Sig Dispense Refill    azithromycin (ZITHROMAX) 500 MG tablet Take 1 tablet by mouth Daily. For 30 days, ongoing therapy- last refill 12-14-24      Fluticasone Furoate-Vilanterol (BREO ELLIPTA) 200-25 MCG/ACT inhaler Inhale 1 puff Daily.      ipratropium-albuterol (DUO-NEB) 0.5-2.5 mg/3 ml nebulizer Take 3 mL by nebulization 4 (Four) Times a Day As Needed for Wheezing or Shortness of Air.      LORazepam (ATIVAN) 0.5 MG tablet Take 1 tablet by mouth 2 (Two) Times a Day As Needed for Anxiety.      pantoprazole (PROTONIX) 40 MG EC tablet Take 1 tablet by mouth Every Night.      rifAMPin (RIFADIN) 300 MG capsule Take 1 capsule by mouth Every Other Day.         Allergies   Allergen Reactions    Codeine     Fluticasone-Salmeterol     Penicillins        Social History     Socioeconomic History    Marital status: Single   Tobacco Use    Smoking status: Every Day   Vaping Use    Vaping status: Never Used   Substance and Sexual Activity    Alcohol use: No    Drug use: Never    Sexual activity: Defer       Family History   Problem Relation Age of Onset    Dementia Father     Breast cancer Neg Hx     Ovarian cancer Neg Hx          REVIEW OF SYSTEMS:  A 12-point review of systems was performed and is negative except as noted above.    Objective   PHYSICAL EXAM:    /66 (BP Location: Right arm, Patient Position: Lying)   Pulse 83   Temp 98.3 °F (36.8 °C) (Oral)   Resp 18   Ht 167.6 cm (66\")   Wt 46.7 kg (103 lb)   SpO2 93%   BMI 16.62 kg/m²     General: well appearing female in no acute distress  HEENT: sclerae anicteric, oropharynx clear  Lymphatics: no cervical, supraclavicular, inguinal, or axillary adenopathy  Neck: Supple. No thyromegaly.  Cardiovascular: regular rate and rhythm, no murmurs  Lungs: clear to auscultation bilaterally. No respiratory distress  Abdomen: soft, nontender, nondistended.  No palpable " organomegaly  Extremities: no lower extremity edema, cyanosis, or clubbing  Skin: no rashes, lesions, bruising, or petechiae  Neuro: Alert and oriented x3. Moves all extremities.    Results:    Results from last 7 days   Lab Units 01/17/25 0413 01/16/25 0406 01/15/25  0417   WBC 10*3/mm3 26.73* 29.66* 21.94*   HEMOGLOBIN g/dL 9.8* 9.9* 10.1*   PLATELETS 10*3/mm3 1,106* 977* 866*     Results from last 7 days   Lab Units 01/17/25 0413 01/16/25  0406 01/15/25  0417   SODIUM mmol/L 128* 129* 131*   POTASSIUM mmol/L 3.8 4.0 4.2   CO2 mmol/L 29.0 30.0* 33.0*   BUN mg/dL 5* 5* 5*   CREATININE mg/dL 0.23* 0.24* 0.26*   GLUCOSE mg/dL 96 115* 105*     Results from last 7 days   Lab Units 01/11/25  1842   AST (SGOT) U/L 101*   ALT (SGPT) U/L 51*   BILIRUBIN mg/dL <0.2   ALK PHOS U/L 434*         CT Chest Without Contrast Diagnostic    Result Date: 1/9/2025  Impression: 1.Right lung multifocal pneumonia 2.Decrease in size of right lower lobe pulmonary nodule. No new suspicious nodule identified. 3.Other chronic findings as discussed. Electronically Signed: Gustavo Proctor MD  1/9/2025 6:07 PM EST  Workstation ID: [id number]    XR Chest 1 View    Result Date: 1/9/2025  Impression: 1.Hyperinflated lungs with underlying COPD/emphysema. 2.Interstitial opacities within the right lung likely representing infectious or inflammatory process. 3.New bleb within the superior lateral aspect of the right upper lobe with surrounding thickening of the wall. Electronically Signed: Pito Hutton  1/9/2025 5:14 PM EST  Workstation ID: [id number]     Assessment    ASSESSMENT & PLAN:  Thrombocytosis  -Platelet count rising from 493K to 1106K during her hospitalization  -Iron studies consistent with a mild iron deficiency anemia  -Currently on steroids for COPD exacerbation  -Acute bowel cytosis likely related to inflammation along with her iron deficiency anemia  -Patient reports a remote history of a myeloproliferative disorder such as essential  thrombocytosis or polycythemia vera but notes that this was diagnosed in the early 2000's and has not necessarily been followed closely since  -Will check JAK2 mutational testing today  -Currently on apixaban for A-fib    Iron deficiency anemia  -Will plan to hold on IV iron given concerns for potential myeloproliferative disorder such as polycythemia vera    Leukocytosis  -Secondary to chronic inflammation as well as steroid use  -Differential predominantly neutrophilic  -No indication for bone marrow biopsy or flow cytometry    Patrick Benavides MD  Hematology and Oncology    1/17/2025  12:53 EST

## 2025-01-17 NOTE — PLAN OF CARE
Problem: Adult Inpatient Plan of Care  Goal: Plan of Care Review  Outcome: Progressing  Goal: Patient-Specific Goal (Individualized)  Outcome: Progressing  Goal: Absence of Hospital-Acquired Illness or Injury  Outcome: Progressing  Intervention: Identify and Manage Fall Risk  Description: Perform standard risk assessment on admission using a validated tool or comprehensive approach appropriate to the patient; reassess fall risk frequently, with change in status or transfer to another level of care.  Communicate risk to interprofessional healthcare team; ensure fall risk visible cue.  Determine need for increased observation, equipment and environmental modification, as well as use of supportive, nonskid footwear.  Adjust safety measures to individual needs and identified risk factors.  Reinforce the importance of active participation with fall risk prevention, safety, and physical activity with the patient and family.  Perform regular intentional rounding to assess need for position change, pain assessment and personal needs, including assistance with toileting.  Recent Flowsheet Documentation  Taken 1/17/2025 1600 by Anabelle Suh, RN  Safety Promotion/Fall Prevention:   activity supervised   assistive device/personal items within reach   clutter free environment maintained   fall prevention program maintained   nonskid shoes/slippers when out of bed   room organization consistent   safety round/check completed  Taken 1/17/2025 1200 by Anabelle Suh, RN  Safety Promotion/Fall Prevention:   activity supervised   assistive device/personal items within reach   clutter free environment maintained   fall prevention program maintained   nonskid shoes/slippers when out of bed   room organization consistent   safety round/check completed  Taken 1/17/2025 0855 by Anabelle Suh, RN  Safety Promotion/Fall Prevention:   assistive device/personal items within reach   activity supervised   clutter free environment  maintained   fall prevention program maintained   nonskid shoes/slippers when out of bed   room organization consistent   safety round/check completed  Intervention: Prevent Skin Injury  Description: Perform a screening for skin injury risk, such as pressure or moisture-associated skin damage on admission and at regular intervals throughout hospital stay.  Keep all areas of skin (especially folds) clean and dry.  Maintain adequate skin hydration.  Relieve and redistribute pressure and protect bony prominences and skin at risk for injury; implement measures based on patient-specific risk factors.  Match turning and repositioning schedule to clinical condition.  Encourage weight shift frequently; assist with reposition if unable to complete independently.  Float heels off bed; avoid pressure on the Achilles tendon.  Keep skin free from extended contact with medical devices.  Optimize nutrition and hydration.  Encourage functional activity and mobility, as early as tolerated.  Use aids (e.g., slide boards, mechanical lift) during transfer.  Recent Flowsheet Documentation  Taken 1/17/2025 1200 by Anabelle Suh RN  Skin Protection:   silicone foam dressing in place   protective footwear used  Taken 1/17/2025 0855 by Anabelle Suh RN  Body Position:   position changed independently   lower extremity elevated   neutral head position   neutral body alignment  Skin Protection:   incontinence pads utilized   silicone foam dressing in place  Intervention: Prevent Infection  Description: Maintain skin and mucous membrane integrity; promote hand, oral and pulmonary hygiene.  Optimize fluid balance, nutrition, sleep and glycemic control to maximize infection resistance.  Identify potential sources of infection early to prevent or mitigate progression of infection (e.g., wound, lines, devices).  Evaluate ongoing need for invasive devices; remove promptly when no longer indicated.  Review vaccination status.  Recent Flowsheet  Documentation  Taken 1/17/2025 1600 by Anabelle Suh RN  Infection Prevention:   environmental surveillance performed   equipment surfaces disinfected   hand hygiene promoted   personal protective equipment utilized   rest/sleep promoted   single patient room provided  Taken 1/17/2025 1200 by Anabelle Suh RN  Infection Prevention:   environmental surveillance performed   equipment surfaces disinfected   hand hygiene promoted   personal protective equipment utilized   rest/sleep promoted   single patient room provided  Taken 1/17/2025 0855 by Anabelle Suh RN  Infection Prevention:   environmental surveillance performed   equipment surfaces disinfected   hand hygiene promoted   personal protective equipment utilized   rest/sleep promoted   single patient room provided  Goal: Optimal Comfort and Wellbeing  Outcome: Progressing  Intervention: Provide Person-Centered Care  Description: Use a family-focused approach to care; encourage support system presence and participation.  Develop trust and rapport by proactively providing information, encouraging questions, addressing concerns and offering reassurance.  Acknowledge emotional response to hospitalization.  Recognize and utilize personal coping strategies and strengths; develop goals via shared decision-making.  Honor spiritual and cultural preferences.  Recent Flowsheet Documentation  Taken 1/17/2025 1200 by Anabelle Suh RN  Trust Relationship/Rapport:   care explained   choices provided   questions answered   questions encouraged   thoughts/feelings acknowledged  Taken 1/17/2025 0855 by Anabelle Suh RN  Trust Relationship/Rapport:   care explained   choices provided   questions answered   questions encouraged   thoughts/feelings acknowledged  Goal: Readiness for Transition of Care  Outcome: Progressing   Goal Outcome Evaluation:

## 2025-01-17 NOTE — THERAPY TREATMENT NOTE
Patient Name: Kristen Jean  : 1960    MRN: [id number]                              Today's Date: 2025       Admit Date: 2025    Visit Dx:     ICD-10-CM ICD-9-CM   1. Multifocal pneumonia  J18.9 486   2. History of MAC infection  Z86.19 V12.09   3. Bandemia  D72.825 288.66   4. Hyponatremia  E87.1 276.1   5. Atrial tachycardia  I47.19 427.89   6. Aortic atherosclerosis  I70.0 440.0   7. Heart failure with improved ejection fraction (HFimpEF)  I50.32 428.32   8. Paroxysmal atrial fibrillation  I48.0 427.31   9. Severe protein-calorie malnutrition  E43 262   10. Mycobacterium avium complex  A31.0 031.2   11. Hypothyroidism (acquired)  E03.9 244.9   12. End stage COPD  J44.9 496   13. Cachexia  R64 799.4   14. Asthma-COPD overlap syndrome  J44.89 493.20   15. Centrilobular emphysema  J43.2 492.8   16. Pneumonia of right upper lobe due to infectious organism  J18.9 486   17. Mycobacterium infection  A31.9 031.9   18. Mucopurulent chronic bronchitis  J41.1 491.1     Patient Active Problem List   Diagnosis    Chronic bronchitis    Uncomplicated asthma    Mycobacterium infection    Emphysema of lung    Asthma-COPD overlap syndrome    Anxiety disorder    Multifocal pneumonia    Hyponatremia    Hypokalemia    Protein calorie malnutrition    Cachexia    End stage COPD    Hypothyroidism (acquired)    Mycobacterium avium complex    Severe protein-calorie malnutrition    Atrial fibrillation/flutter    Heart failure with improved ejection fraction (HFimpEF)    Aortic atherosclerosis    Atrial tachycardia     Past Medical History:   Diagnosis Date    Anxiety disorder 2025    Asthma-COPD overlap syndrome 2025    Breast injury     Cachexia 2025    Chronic bronchitis 2016    Emphysema of lung 2025    Hypothyroidism (acquired) 2025    Mycobacterium infection 2016    T2DM (type 2 diabetes mellitus) 2025     Past Surgical History:   Procedure Laterality Date    TOOTH  EXTRACTION      1990 and 2002      General Information       Row Name 01/17/25 1025          OT Time and Intention    Document Type therapy note (daily note)  -     Mode of Treatment occupational therapy  -       Row Name 01/17/25 1025          General Information    Patient Profile Reviewed yes  -     Existing Precautions/Restrictions fall;oxygen therapy device and L/min  -     Barriers to Rehab medically complex;previous functional deficit  -       Row Name 01/17/25 1025          Cognition    Orientation Status (Cognition) oriented x 3  -[facility] Name 01/17/25 1025          Safety Issues/Impairments Affecting Functional Mobility    Safety Issues Affecting Function (Mobility) insight into deficits/self-awareness;judgment;problem-solving;safety precaution awareness;safety precautions follow-through/compliance;sequencing abilities  -     Impairments Affecting Function (Mobility) endurance/activity tolerance;postural/trunk control;shortness of breath;strength  -               User Key  (r) = Recorded By, (t) = Taken By, (c) = Cosigned By      Initials Name Provider Type     Estefany Elmore OT Occupational Therapist                     Mobility/ADL's       [facility] Name 01/17/25 1026          Bed Mobility    Comment, (Bed Mobility) Received UI and returned to chair  -[facility] Name 01/17/25 1026          Transfers    Transfers sit-stand transfer;toilet transfer  -     Comment, (Transfers) no reports of dizziness or lightheadedness with positional changes  -[facility] Name 01/17/25 1026          Sit-Stand Transfer    Sit-Stand [place] (Transfers) supervision;verbal cues  -     Comment, (Sit-Stand Transfer) no AD  -[facility] Name 01/17/25 1026          Toilet Transfer    Type (Toilet Transfer) stand pivot/stand step;stand-sit;sit-stand  -     [place] Level (Toilet Transfer) supervision  -     Assistive Device (Toilet Transfer) commode, bedside without drop arms  -       Row Name  01/17/25 1026          Functional Mobility    Functional Mobility- Ind. Level contact guard assist;verbal cues required  -SCOTTY     Functional Mobility-Distance (Feet) 15  -SCOTTY     Functional Mobility- Safety Issues supplemental O2  -SCOTTY     Functional Mobility- Comment Pt ambulated short distances in room with CGA/no AD; cues for O2 tubing management  -SCOTTY       Row Name 01/17/25 1026          Activities of Daily Living    BADL Assessment/Intervention lower body dressing;grooming;toileting  -       Row Name 01/17/25 1026          Lower Body Dressing Assessment/Training    [place] Level (Lower Body Dressing) don;shoes/slippers;set up  -SCOTTY     Position (Lower Body Dressing) unsupported sitting  -SCOTTY       Row Name 01/17/25 1026          Grooming Assessment/Training    [place] Level (Grooming) oral care regimen;wash face, hands;supervision  -SCOTTY     Position (Grooming) sink side;supported standing  -SCOTTY     Comment, (Grooming) Pt's forearms resting on sink countertop  -SCOTTY       Row Name 01/17/25 1026          Toileting Assessment/Training    [place] Level (Toileting) perform perineal hygiene;supervision  -SCOTTY     Assistive Devices (Toileting) commode, bedside without drop arms  -SCOTTY     Position (Toileting) unsupported sitting;unsupported standing  -SCOTTY               User Key  (r) = Recorded By, (t) = Taken By, (c) = Cosigned By      Initials Name Provider Type    Estefany Salguero OT Occupational Therapist                   Obj/Interventions       Row Name 01/17/25 1030          Balance    Balance Assessment sitting static balance;sitting dynamic balance;standing static balance;standing dynamic balance  -SCOTTY     Static Sitting Balance independent  -SCOTTY     Dynamic Sitting Balance supervision  -SCOTTY     Position, Sitting Balance unsupported;sitting in chair  -SCOTTY     Static Standing Balance supervision  -SCOTTY     Dynamic Standing Balance contact guard  -SCOTTY     Balance Interventions standing;dynamic;occupation  based/functional task  -SCOTTY     Comment, Balance SUP toileting tasks in standing  -SCOTTY               User Key  (r) = Recorded By, (t) = Taken By, (c) = Cosigned By      Initials Name Provider Type    Estefany Salguero OT Occupational Therapist                   Goals/Plan    No documentation.                  Clinical Impression       Row Name 01/17/25 1031          Pain Scale: FACES Pre/Post-Treatment    Pain: FACES Scale, Pretreatment 0-->no hurt  -SCOTTY     Posttreatment Pain Rating 0-->no hurt  -SCOTTY       Row Name 01/17/25 1031          Plan of Care Review    Plan of Care Reviewed With patient  -SCOTTY     Progress improving  -SCOTTY     Outcome Evaluation Pt progressing toward ADL goals, this date completed BSC transfer with SUP, SUP for toileting tasks in standing, ambulated short distances in room with CGA/no AD. Pt's O2 sat. 89% on 3.5L following self-care activity with rest breaks needed throughout for recovery. Recommend home with assist and HHOT at discharge.  -       Row Name 01/17/25 1031          Therapy Plan Review/Discharge Plan (OT)    Anticipated Discharge Disposition (OT) home with assist;home with home health  -       Row Name 01/17/25 1031          Vital Signs    Pretreatment Heart Rate (beats/min) 80  -SCOTTY     Intratreatment Heart Rate (beats/min) 92  -SCOTTY     Pre SpO2 (%) 93  -SCOTTY     O2 Delivery Pre Treatment nasal cannula  -SCOTTY     Intra SpO2 (%) 89  -SCOTTY     O2 Delivery Intra Treatment nasal cannula  -SCOTTY     Post SpO2 (%) 93  -SCOTTY     O2 Delivery Post Treatment nasal cannula  -SCOTTY     Pre Patient Position Sitting  -SCOTTY     Intra Patient Position Standing  -SCOTTY     Post Patient Position Sitting  -SCOTTY       Row Name 01/17/25 1031          Positioning and Restraints    Pre-Treatment Position sitting in chair/recliner  -SCOTTY     Post Treatment Position chair  -SCOTTY     In Chair notified nsg;reclined;call light within reach;encouraged to call for assist;exit alarm on;waffle cushion;legs elevated  -SCOTTY               User  Key  (r) = Recorded By, (t) = Taken By, (c) = Cosigned By      Initials Name Provider Type    Estefany Salguero OT Occupational Therapist                   Outcome Measures       Row Name 01/17/25 1035          How much help from another is currently needed...    Putting on and taking off regular lower body clothing? 3  -SCOTTY     Bathing (including washing, rinsing, and drying) 3  -SCOTTY     Toileting (which includes using toilet bed pan or urinal) 4  -SCOTTY     Putting on and taking off regular upper body clothing 4  -SCOTTY     Taking care of personal grooming (such as brushing teeth) 3  -SCOTTY     Eating meals 4  -SCOTTY     AM-PAC 6 Clicks Score (OT) 21  -SCOTTY       Row Name 01/17/25 1035          Functional Assessment    Outcome Measure Options AM-PAC 6 Clicks Daily Activity (OT)  -SCOTTY               User Key  (r) = Recorded By, (t) = Taken By, (c) = Cosigned By      Initials Name Provider Type    Estefany Salguero OT Occupational Therapist                    Occupational Therapy Education       Title: PT OT SLP Therapies (In Progress)       Topic: Occupational Therapy (In Progress)       Point: ADL training (Done)       Description:   Instruct learner(s) on proper safety adaptation and remediation techniques during self care or transfers.   Instruct in proper use of assistive devices.                  Learning Progress Summary            Patient Acceptance, E, VU,NR by  at 1/17/2025 1036    Comment: OT POC; incentive spirometer use and goal setting; energy conservation/pacing; optimal breathing techniques; discharge planning    Acceptance, E, NR by  at 1/15/2025 1528    Acceptance, E, NR by  at 1/10/2025 0820    Comment: benefits of activity, PLB                      Point: Home exercise program (Done)       Description:   Instruct learner(s) on appropriate technique for monitoring, assisting and/or progressing therapeutic exercises/activities.                  Learning Progress Summary            Patient Acceptance, E, VU,NR  by SCOTTY at 1/17/2025 1036    Comment: OT POC; incentive spirometer use and goal setting; energy conservation/pacing; optimal breathing techniques; discharge planning                      Point: Precautions (Done)       Description:   Instruct learner(s) on prescribed precautions during self-care and functional transfers.                  Learning Progress Summary            Patient Acceptance, E, VU,NR by SCOTTY at 1/17/2025 1036    Comment: OT POC; incentive spirometer use and goal setting; energy conservation/pacing; optimal breathing techniques; discharge planning                      Point: Body mechanics (Not Started)       Description:   Instruct learner(s) on proper positioning and spine alignment during self-care, functional mobility activities and/or exercises.                  Learner Progress:  Not documented in this visit.                              User Key       Initials Effective Dates Name Provider Type Discipline     02/03/23 -  Liyah Sharif, OT Occupational Therapist OT    SCOTTY 06/16/21 -  Estefany Elmore OT Occupational Therapist OT                  OT Recommendation and Plan     Plan of Care Review  Plan of Care Reviewed With: patient  Progress: improving  Outcome Evaluation: Pt progressing toward ADL goals, this date completed BSC transfer with SUP, SUP for toileting tasks in standing, ambulated short distances in room with CGA/no AD. Pt's O2 sat. 89% on 3.5L following self-care activity with rest breaks needed throughout for recovery. Recommend home with assist and HHOT at discharge.     Time Calculation:         Time Calculation- OT       Row Name 01/17/25 0944             Time Calculation- OT    OT Start Time 0944  -SCOTTY      OT Received On 01/17/25  -SCOTTY         Timed Charges    97980 - OT Self Care/Mgmt Minutes 40  -SCOTTY         Total Minutes    Timed Charges Total Minutes 40  -SCOTTY       Total Minutes 40  -SCOTTY                User Key  (r) = Recorded By, (t) = Taken By, (c) = Cosigned By      Initials  Name Provider Type    Estefany Salguero OT Occupational Therapist                  Therapy Charges for Today       Code Description Service Date Service Provider Modifiers Qty    44992498089 HC OT SELF CARE/MGMT/TRAIN EA 15 MIN 1/17/2025 Estefany Elmore OT GO 3                 Estefany Elmore OT  1/17/2025

## 2025-01-18 LAB
ANION GAP SERPL CALCULATED.3IONS-SCNC: 7 MMOL/L (ref 5–15)
BASOPHILS # BLD AUTO: 0.05 10*3/MM3 (ref 0–0.2)
BASOPHILS NFR BLD AUTO: 0.2 % (ref 0–1.5)
BUN SERPL-MCNC: 6 MG/DL (ref 8–23)
BUN/CREAT SERPL: 21.4 (ref 7–25)
CALCIUM SPEC-SCNC: 9.3 MG/DL (ref 8.6–10.5)
CHLORIDE SERPL-SCNC: 91 MMOL/L (ref 98–107)
CO2 SERPL-SCNC: 32 MMOL/L (ref 22–29)
CREAT SERPL-MCNC: 0.28 MG/DL (ref 0.57–1)
DEPRECATED RDW RBC AUTO: 44.2 FL (ref 37–54)
EGFRCR SERPLBLD CKD-EPI 2021: 121.4 ML/MIN/1.73
EOSINOPHIL # BLD AUTO: 0.91 10*3/MM3 (ref 0–0.4)
EOSINOPHIL NFR BLD AUTO: 4.4 % (ref 0.3–6.2)
ERYTHROCYTE [DISTWIDTH] IN BLOOD BY AUTOMATED COUNT: 15.2 % (ref 12.3–15.4)
GLUCOSE SERPL-MCNC: 98 MG/DL (ref 65–99)
HCT VFR BLD AUTO: 31.2 % (ref 34–46.6)
HGB BLD-MCNC: 10.3 G/DL (ref 12–15.9)
IMM GRANULOCYTES # BLD AUTO: 0.25 10*3/MM3 (ref 0–0.05)
IMM GRANULOCYTES NFR BLD AUTO: 1.2 % (ref 0–0.5)
LYMPHOCYTES # BLD AUTO: 2.15 10*3/MM3 (ref 0.7–3.1)
LYMPHOCYTES NFR BLD AUTO: 10.4 % (ref 19.6–45.3)
MCH RBC QN AUTO: 26.5 PG (ref 26.6–33)
MCHC RBC AUTO-ENTMCNC: 33 G/DL (ref 31.5–35.7)
MCV RBC AUTO: 80.2 FL (ref 79–97)
MONOCYTES # BLD AUTO: 1.13 10*3/MM3 (ref 0.1–0.9)
MONOCYTES NFR BLD AUTO: 5.5 % (ref 5–12)
NEUTROPHILS NFR BLD AUTO: 16.11 10*3/MM3 (ref 1.7–7)
NEUTROPHILS NFR BLD AUTO: 78.3 % (ref 42.7–76)
NRBC BLD AUTO-RTO: 0 /100 WBC (ref 0–0.2)
PLATELET # BLD AUTO: 1149 10*3/MM3 (ref 140–450)
PMV BLD AUTO: 8.7 FL (ref 6–12)
POTASSIUM SERPL-SCNC: 4.4 MMOL/L (ref 3.5–5.2)
RBC # BLD AUTO: 3.89 10*6/MM3 (ref 3.77–5.28)
SODIUM SERPL-SCNC: 130 MMOL/L (ref 136–145)
WBC NRBC COR # BLD AUTO: 20.6 10*3/MM3 (ref 3.4–10.8)

## 2025-01-18 PROCEDURE — 94799 UNLISTED PULMONARY SVC/PX: CPT

## 2025-01-18 PROCEDURE — 99232 SBSQ HOSP IP/OBS MODERATE 35: CPT | Performed by: STUDENT IN AN ORGANIZED HEALTH CARE EDUCATION/TRAINING PROGRAM

## 2025-01-18 PROCEDURE — 99232 SBSQ HOSP IP/OBS MODERATE 35: CPT | Performed by: INTERNAL MEDICINE

## 2025-01-18 PROCEDURE — 63710000001 PREDNISONE PER 1 MG: Performed by: INTERNAL MEDICINE

## 2025-01-18 PROCEDURE — 94664 DEMO&/EVAL PT USE INHALER: CPT

## 2025-01-18 PROCEDURE — 94761 N-INVAS EAR/PLS OXIMETRY MLT: CPT

## 2025-01-18 PROCEDURE — 80048 BASIC METABOLIC PNL TOTAL CA: CPT | Performed by: STUDENT IN AN ORGANIZED HEALTH CARE EDUCATION/TRAINING PROGRAM

## 2025-01-18 PROCEDURE — 93010 ELECTROCARDIOGRAM REPORT: CPT | Performed by: INTERNAL MEDICINE

## 2025-01-18 PROCEDURE — 25010000002 NA FERRIC GLUC CPLX PER 12.5 MG: Performed by: INTERNAL MEDICINE

## 2025-01-18 PROCEDURE — 85025 COMPLETE CBC W/AUTO DIFF WBC: CPT | Performed by: STUDENT IN AN ORGANIZED HEALTH CARE EDUCATION/TRAINING PROGRAM

## 2025-01-18 PROCEDURE — 93005 ELECTROCARDIOGRAM TRACING: CPT | Performed by: STUDENT IN AN ORGANIZED HEALTH CARE EDUCATION/TRAINING PROGRAM

## 2025-01-18 RX ORDER — DIPHENHYDRAMINE HYDROCHLORIDE AND LIDOCAINE HYDROCHLORIDE AND ALUMINUM HYDROXIDE AND MAGNESIUM HYDRO
5 KIT EVERY 6 HOURS
Status: DISCONTINUED | OUTPATIENT
Start: 2025-01-18 | End: 2025-01-19

## 2025-01-18 RX ADMIN — IPRATROPIUM BROMIDE AND ALBUTEROL SULFATE 3 ML: 2.5; .5 SOLUTION RESPIRATORY (INHALATION) at 08:14

## 2025-01-18 RX ADMIN — LORAZEPAM 0.25 MG: 0.5 TABLET ORAL at 12:57

## 2025-01-18 RX ADMIN — LIDOCAINE HYDROCHLORIDE 5 ML: 20 SOLUTION OROPHARYNGEAL at 16:48

## 2025-01-18 RX ADMIN — MINOCYCLINE HYDROCHLORIDE 100 MG: 50 CAPSULE ORAL at 08:38

## 2025-01-18 RX ADMIN — PREDNISONE 20 MG: 20 TABLET ORAL at 08:38

## 2025-01-18 RX ADMIN — ALBUTEROL SULFATE 2.5 MG: 2.5 SOLUTION RESPIRATORY (INHALATION) at 03:52

## 2025-01-18 RX ADMIN — PANTOPRAZOLE SODIUM 40 MG: 40 TABLET, DELAYED RELEASE ORAL at 20:59

## 2025-01-18 RX ADMIN — METOPROLOL TARTRATE 25 MG: 25 TABLET, FILM COATED ORAL at 20:58

## 2025-01-18 RX ADMIN — LORAZEPAM 0.25 MG: 0.5 TABLET ORAL at 20:59

## 2025-01-18 RX ADMIN — APIXABAN 5 MG: 5 TABLET, FILM COATED ORAL at 08:38

## 2025-01-18 RX ADMIN — APIXABAN 5 MG: 5 TABLET, FILM COATED ORAL at 20:59

## 2025-01-18 RX ADMIN — FLUTICASONE FUROATE AND VILANTEROL 1 PUFF: 200; 25 POWDER RESPIRATORY (INHALATION) at 22:00

## 2025-01-18 RX ADMIN — LORAZEPAM 0.25 MG: 0.5 TABLET ORAL at 05:33

## 2025-01-18 RX ADMIN — SODIUM CHLORIDE 125 MG: 9 INJECTION, SOLUTION INTRAVENOUS at 11:34

## 2025-01-18 RX ADMIN — MIRTAZAPINE 15 MG: 15 TABLET, FILM COATED ORAL at 20:59

## 2025-01-18 RX ADMIN — IPRATROPIUM BROMIDE AND ALBUTEROL SULFATE 3 ML: 2.5; .5 SOLUTION RESPIRATORY (INHALATION) at 13:32

## 2025-01-18 RX ADMIN — DILTIAZEM HYDROCHLORIDE 180 MG: 180 CAPSULE, EXTENDED RELEASE ORAL at 08:39

## 2025-01-18 RX ADMIN — IPRATROPIUM BROMIDE AND ALBUTEROL SULFATE 3 ML: 2.5; .5 SOLUTION RESPIRATORY (INHALATION) at 16:34

## 2025-01-18 RX ADMIN — LIDOCAINE HYDROCHLORIDE 5 ML: 20 SOLUTION OROPHARYNGEAL at 20:58

## 2025-01-18 RX ADMIN — MINOCYCLINE HYDROCHLORIDE 100 MG: 50 CAPSULE ORAL at 20:59

## 2025-01-18 RX ADMIN — PHENOL 1 SPRAY: 1.5 LIQUID ORAL at 14:38

## 2025-01-18 RX ADMIN — IPRATROPIUM BROMIDE AND ALBUTEROL SULFATE 3 ML: 2.5; .5 SOLUTION RESPIRATORY (INHALATION) at 21:59

## 2025-01-18 NOTE — PROGRESS NOTES
HEMATOLOGY/ONCOLOGY PROGRESS NOTE    Subjective      CC: Thrombocytosis    SUBJECTIVE:   No acute events overnight.  Patient sitting in the chair this a.m.  Notes worsening fatigue, general malaise as well as increased shortness of breath with exertion today.  Denies any fevers        Past Medical History, Past Surgical History, Social History, Family History have been reviewed and are without significant changes except as mentioned.      Medications:  The current medication list was reviewed in the EMR    ALLERGIES:   Allergies   Allergen Reactions    Codeine     Fluticasone-Salmeterol     Penicillins        ROS:  A comprehensive 10-point review of systems was performed and was negative except as mentioned.      Objective      Vitals:    01/18/25 0345 01/18/25 0352 01/18/25 0814 01/18/25 0836   BP: 144/67   128/50   BP Location: Right arm   Right arm   Patient Position: Sitting   Sitting   Pulse:  88 92 92   Resp: 18 18 18 18   Temp: 97.3 °F (36.3 °C)   98 °F (36.7 °C)   TempSrc: Oral   Oral   SpO2:  92% 92% 91%   Weight:       Height:              General: Sitting in chair in no acute distress  HEENT: sclerae anicteric, oropharynx clear  Lymphatics: no cervical, supraclavicular, inguinal, or axillary adenopathy  Cardiovascular: regular rate and rhythm, no murmurs  Lungs: clear to auscultation bilaterally  Abdomen: soft, nontender, nondistended.  No palpable organomegaly  Extremities: no lower extremity edema  Skin: no rashes, lesions, bruising, or petechiae  Neuro: Alert and oriented x 3. Moves all extremities.    RECENT LABS:    Results from last 7 days   Lab Units 01/18/25  0350 01/17/25 0413 01/16/25  0406   WBC 10*3/mm3 20.60* 26.73* 29.66*   HEMOGLOBIN g/dL 10.3* 9.8* 9.9*   PLATELETS 10*3/mm3 1,149* 1,106* 977*     Results from last 7 days   Lab Units 01/18/25  0350 01/17/25 0413 01/16/25  0406   SODIUM mmol/L 130* 128* 129*   POTASSIUM mmol/L 4.4 3.8 4.0   CO2 mmol/L 32.0* 29.0 30.0*   BUN mg/dL 6* 5* 5*    CREATININE mg/dL 0.28* 0.23* 0.24*   GLUCOSE mg/dL 98 96 115*     Results from last 7 days   Lab Units 01/11/25  1842   AST (SGOT) U/L 101*   ALT (SGPT) U/L 51*   BILIRUBIN mg/dL <0.2   ALK PHOS U/L 434*         No radiology results for the last 7 days        Assessment   ASSESSMENT & PLAN:  Thrombocytosis  -Platelet count rising from 493K to 1106K during her hospitalization  -Iron studies consistent with a mild iron deficiency anemia  -Currently on steroids for COPD exacerbation  -Acute bowel cytosis likely related to inflammation along with her iron deficiency anemia  -Patient reports a remote history of a myeloproliferative disorder such as essential thrombocytosis or polycythemia vera but notes that this was diagnosed in the early 2000's and has not necessarily been followed closely since  -JAK2 mutational testing pending  -Currently on apixaban for A-fib     Iron deficiency anemia  -Iron studies on 1/17 consistent with iron deficiency anemia  -Given her worsening general malaise and fatigue, will initiate  ferrous gluconate 125 mg x 3 days  -Will monitor her hemoglobin     Leukocytosis  -Secondary to chronic inflammation as well as steroid use  -Differential predominantly neutrophilic  -No indication for bone marrow biopsy or flow cytometry       Patrick Benavides MD  Hematology and Oncology    1/18/2025  10:47 EST

## 2025-01-18 NOTE — PLAN OF CARE
Problem: Adult Inpatient Plan of Care  Goal: Plan of Care Review  Outcome: Progressing  Goal: Patient-Specific Goal (Individualized)  Outcome: Progressing  Goal: Absence of Hospital-Acquired Illness or Injury  Outcome: Progressing  Intervention: Identify and Manage Fall Risk  Description: Perform standard risk assessment on admission using a validated tool or comprehensive approach appropriate to the patient; reassess fall risk frequently, with change in status or transfer to another level of care.  Communicate risk to interprofessional healthcare team; ensure fall risk visible cue.  Determine need for increased observation, equipment and environmental modification, as well as use of supportive, nonskid footwear.  Adjust safety measures to individual needs and identified risk factors.  Reinforce the importance of active participation with fall risk prevention, safety, and physical activity with the patient and family.  Perform regular intentional rounding to assess need for position change, pain assessment and personal needs, including assistance with toileting.  Recent Flowsheet Documentation  Taken 1/18/2025 1600 by Anabelle Suh, RN  Safety Promotion/Fall Prevention:   activity supervised   assistive device/personal items within reach   clutter free environment maintained   fall prevention program maintained   nonskid shoes/slippers when out of bed   room organization consistent   safety round/check completed  Taken 1/18/2025 1200 by Anabelle Suh, RN  Safety Promotion/Fall Prevention:   activity supervised   assistive device/personal items within reach   clutter free environment maintained   fall prevention program maintained   nonskid shoes/slippers when out of bed   room organization consistent   safety round/check completed  Taken 1/18/2025 0836 by Anabelle Suh, RN  Safety Promotion/Fall Prevention:   activity supervised   assistive device/personal items within reach   clutter free environment  maintained   fall prevention program maintained   nonskid shoes/slippers when out of bed   room organization consistent   safety round/check completed  Intervention: Prevent Skin Injury  Description: Perform a screening for skin injury risk, such as pressure or moisture-associated skin damage on admission and at regular intervals throughout hospital stay.  Keep all areas of skin (especially folds) clean and dry.  Maintain adequate skin hydration.  Relieve and redistribute pressure and protect bony prominences and skin at risk for injury; implement measures based on patient-specific risk factors.  Match turning and repositioning schedule to clinical condition.  Encourage weight shift frequently; assist with reposition if unable to complete independently.  Float heels off bed; avoid pressure on the Achilles tendon.  Keep skin free from extended contact with medical devices.  Optimize nutrition and hydration.  Encourage functional activity and mobility, as early as tolerated.  Use aids (e.g., slide boards, mechanical lift) during transfer.  Recent Flowsheet Documentation  Taken 1/18/2025 1600 by Anabelle Suh RN  Body Position:   lower extremity elevated   neutral body alignment   neutral head position  Skin Protection:   protective footwear used   silicone foam dressing in place   incontinence pads utilized  Taken 1/18/2025 1200 by Anabelle Suh RN  Skin Protection:   protective footwear used   silicone foam dressing in place  Taken 1/18/2025 0836 by Anabelle Suh RN  Body Position:   lower extremity elevated   neutral body alignment   neutral head position  Skin Protection:   protective footwear used   silicone foam dressing in place  Intervention: Prevent Infection  Description: Maintain skin and mucous membrane integrity; promote hand, oral and pulmonary hygiene.  Optimize fluid balance, nutrition, sleep and glycemic control to maximize infection resistance.  Identify potential sources of infection early  to prevent or mitigate progression of infection (e.g., wound, lines, devices).  Evaluate ongoing need for invasive devices; remove promptly when no longer indicated.  Review vaccination status.  Recent Flowsheet Documentation  Taken 1/18/2025 1600 by Anabelle Suh RN  Infection Prevention:   environmental surveillance performed   equipment surfaces disinfected   hand hygiene promoted   personal protective equipment utilized   rest/sleep promoted   single patient room provided  Taken 1/18/2025 1200 by Anabelle Suh RN  Infection Prevention:   environmental surveillance performed   equipment surfaces disinfected   hand hygiene promoted   personal protective equipment utilized   rest/sleep promoted   single patient room provided  Taken 1/18/2025 0836 by Anabelle Suh RN  Infection Prevention:   environmental surveillance performed   equipment surfaces disinfected   hand hygiene promoted   personal protective equipment utilized   rest/sleep promoted   single patient room provided  Goal: Optimal Comfort and Wellbeing  Outcome: Progressing  Intervention: Provide Person-Centered Care  Description: Use a family-focused approach to care; encourage support system presence and participation.  Develop trust and rapport by proactively providing information, encouraging questions, addressing concerns and offering reassurance.  Acknowledge emotional response to hospitalization.  Recognize and utilize personal coping strategies and strengths; develop goals via shared decision-making.  Honor spiritual and cultural preferences.  Recent Flowsheet Documentation  Taken 1/18/2025 0836 by Anabelle Suh RN  Trust Relationship/Rapport:   care explained   choices provided   questions answered   questions encouraged   thoughts/feelings acknowledged  Goal: Readiness for Transition of Care  Outcome: Progressing     Problem: Fall Injury Risk  Goal: Absence of Fall and Fall-Related Injury  Outcome: Progressing  Intervention: Identify  and Manage Contributors  Description: Develop a fall prevention plan, considering patient-centered interventions and family/caregiver involvement; identify and address patient's facilitators and barriers.  Provide reorientation, appropriate sensory stimulation and routines with changes in mental status to decrease risk of fall.  Promote use of personal vision and auditory aids.  Assess assistance level required for safe and effective self-care; provide support as needed, such as toileting and mobilization. For age 65 and older, implement timed toileting with assistance.  Encourage physical activity, such as performance of mobility and self-care at highest level of patient ability, multicomponent exercise program and provision of appropriate assistive devices.  If fall occurs, assess the severity of injury; implement fall injury protocol. Determine the cause and revise fall injury prevention plan.  Regularly review and advocate for medication adjustment to decrease fall risk; consider administration times, polypharmacy and age.  Balance adequate pain management with potential for oversedation.  Recent Flowsheet Documentation  Taken 1/18/2025 1600 by Anabelle Suh RN  Medication Review/Management: medications reviewed  Taken 1/18/2025 1200 by Anabelle Suh RN  Medication Review/Management: medications reviewed  Taken 1/18/2025 0836 by Anabelle Suh RN  Medication Review/Management: medications reviewed  Intervention: Promote Injury-Free Environment  Description: Provide a safe, barrier-free environment that encourages independent activity.  Keep care area uncluttered and well-lighted.  Determine need for increased observation or monitoring.  Avoid use of devices that minimize mobility, such as restraints or indwelling urinary catheter.  Recent Flowsheet Documentation  Taken 1/18/2025 1600 by Anabelle Suh RN  Safety Promotion/Fall Prevention:   activity supervised   assistive device/personal items within  reach   clutter free environment maintained   fall prevention program maintained   nonskid shoes/slippers when out of bed   room organization consistent   safety round/check completed  Taken 1/18/2025 1200 by Anabelle Suh RN  Safety Promotion/Fall Prevention:   activity supervised   assistive device/personal items within reach   clutter free environment maintained   fall prevention program maintained   nonskid shoes/slippers when out of bed   room organization consistent   safety round/check completed  Taken 1/18/2025 0836 by Anabelle Suh RN  Safety Promotion/Fall Prevention:   activity supervised   assistive device/personal items within reach   clutter free environment maintained   fall prevention program maintained   nonskid shoes/slippers when out of bed   room organization consistent   safety round/check completed     Problem: Pain Acute  Goal: Optimal Pain Control and Function  Outcome: Progressing  Intervention: Prevent or Manage Pain  Description: Evaluate pain level, effect of treatment and patient response at regular intervals.  Minimize painful stimuli; coordinate care and adjust environment (e.g., light, noise, unnecessary movement); promote sleep/rest.  Match pharmacologic analgesia to severity and type of pain mechanism (e.g., neuropathic, muscle, inflammatory); consider multimodal approach.  Provide medication at regular intervals; titrate to patient response; premedicate for painful procedures.  Manage breakthrough pain with additional doses; consider rotation or switching medication.  Monitor for signs of substance tolerance (increased dose to reach desired effect, decreased effect with same dose).  Manage medication-induced adverse events and side effects.  Provide multimodal interventions, such as physical activity, therapeutic exercise, yoga, TENS (transcutaneous electrical nerve stimulation) and manual therapy.  Train in functional activity modifications, such as body mechanics, posture,  ergonomics, energy conservation and activity pacing.  Consider addition of complementary or alternative therapy, such as acupuncture, hypnosis or therapeutic touch.  Recent Flowsheet Documentation  Taken 1/18/2025 1600 by Anabelle Suh RN  Medication Review/Management: medications reviewed  Taken 1/18/2025 1200 by Anabelle Suh RN  Medication Review/Management: medications reviewed  Taken 1/18/2025 0836 by Anabelle Suh RN  Medication Review/Management: medications reviewed     Problem: Infection  Goal: Absence of Infection Signs and Symptoms  Outcome: Progressing     Problem: Sepsis/Septic Shock  Goal: Optimal Coping  Outcome: Progressing  Goal: Absence of Bleeding  Outcome: Progressing  Goal: Blood Glucose Level Within Target Range  Outcome: Progressing  Goal: Absence of Infection Signs and Symptoms  Outcome: Progressing  Intervention: Initiate Sepsis Management  Description: Provide fluid therapy, such as crystalloid or albumin, to increase intravascular volume, organ perfusion and oxygen delivery.  Provide respiratory support, such as oxygen therapy, noninvasive or invasive positive pressure ventilation, to achieve oxygenation and ventilation goal; avoid hyperoxemia.  Obtain cultures prior to initiating antimicrobial therapy when possible. Do not delay treatment for laboratory results in the presence of high suspicion or clinical indicators.  Administer intravenous broad-spectrum antimicrobial therapy promptly.  Implement hemodynamic monitoring to guide intravascular support based on individual targeted parameters.  Determine and address underlying source of infection aggressively; implement transmission-based precautions and isolation, as indicated.  Recent Flowsheet Documentation  Taken 1/18/2025 1600 by Anabelle Suh RN  Infection Prevention:   environmental surveillance performed   equipment surfaces disinfected   hand hygiene promoted   personal protective equipment utilized   rest/sleep  promoted   single patient room provided  Taken 1/18/2025 1200 by Anabelle Suh RN  Infection Prevention:   environmental surveillance performed   equipment surfaces disinfected   hand hygiene promoted   personal protective equipment utilized   rest/sleep promoted   single patient room provided  Taken 1/18/2025 0836 by Anabelle Suh RN  Infection Prevention:   environmental surveillance performed   equipment surfaces disinfected   hand hygiene promoted   personal protective equipment utilized   rest/sleep promoted   single patient room provided  Intervention: Promote Recovery  Description: Encourage pulmonary hygiene, such as cough-enhancement and airway-clearance techniques, that may include use of incentive spirometry, deep breathing and cough.  Encourage early rehabilitation and physical activity to optimize functional ability and activity tolerance, as well as minimize delirium.  Promote energy conservation; minimize oxygen demand and consumption by adjusting environment, decreasing stimulation, maintaining normothermia and treating pain.  Optimize fluid balance, nutrition intake, sleep and glycemic control to maintain tissue perfusion and enhance immune response.  Recent Flowsheet Documentation  Taken 1/18/2025 1600 by Anabelle Suh RN  Activity Management: up in chair  Taken 1/18/2025 1200 by Anabelle Suh RN  Activity Management: up in chair  Taken 1/18/2025 0836 by Anabelle Suh RN  Activity Management: up in chair  Goal: Optimal Nutrition Delivery  Outcome: Progressing   Goal Outcome Evaluation:

## 2025-01-18 NOTE — PROGRESS NOTES
[facility]  PROGRESS NOTE    Patient Name: Kristen Jean  : 1960  MRN: [id number]    Date of Admission: 2025  Primary Care Physician: Moiz Nova MD    Subjective   Subjective     CC:  Shortness of breath    HPI:  Patient seen resting in bedside chair in no acute distress.  Reports continued improvement in symptoms. Planning for discharge tomorrow.       Objective   Objective     Vital Signs:   Temp:  [97.2 °F (36.2 °C)-98.6 °F (37 °C)] 97.2 °F (36.2 °C)  Heart Rate:  [88-97] 94  Resp:  [16-18] 18  BP: (117-144)/(50-67) 117/61  Flow (L/min) (Oxygen Therapy):  [3-5] 3.5     Physical Exam  Constitutional:       General: She is not in acute distress.  Cardiovascular:      Rate and Rhythm: Normal rate.      Pulses: Normal pulses.   Pulmonary:      Effort: Pulmonary effort is normal. No respiratory distress.      Comments: Diminished breath sounds throughout  Abdominal:      General: There is no distension.      Palpations: Abdomen is soft.      Tenderness: There is no abdominal tenderness. There is no guarding or rebound.   Musculoskeletal:      Right lower leg: No edema.      Left lower leg: No edema.   Skin:     General: Skin is warm.   Neurological:      Mental Status: She is alert and oriented to person, place, and time.   Psychiatric:         Mood and Affect: Mood normal.         Behavior: Behavior normal.          Results Reviewed:  LAB RESULTS:      Lab 25  0350 25  0413 25  0406 01/15/25  0417 25  0427 25  0554 25  2135 25  1859 25  1842   WBC 20.60* 26.73* 29.66* 21.94* 17.07*   < >  --    < >  --    HEMOGLOBIN 10.3* 9.8* 9.9* 10.1* 9.8*   < >  --    < >  --    HEMATOCRIT 31.2* 30.2* 30.5* 31.4* 30.6*   < >  --    < >  --    PLATELETS 1,149* 1,106* 977* 866* 816*   < >  --    < >  --    NEUTROS ABS 16.11* 22.10* 24.59* 16.61* 12.23*   < >  --   --   --    IMMATURE GRANS (ABS) 0.25* 0.43* 0.42* 0.37*  0.30*   < >  --   --   --    LYMPHS ABS 2.15 1.43 1.97 2.49 2.28   < >  --   --   --    MONOS ABS 1.13* 1.44* 1.43* 1.31* 1.32*   < >  --   --   --    EOS ABS 0.91* 1.23* 1.16* 1.07* 0.88*   < >  --   --   --    MCV 80.2 81.2 81.6 82.6 82.5   < >  --    < >  --    HSTROP T  --   --   --   --   --   --  14*  --  12    < > = values in this interval not displayed.         Lab 01/18/25  0350 01/17/25  0413 01/16/25  0406 01/15/25  0417 01/14/25  0427 01/13/25  0344 01/12/25  0555 01/11/25  1842   SODIUM 130* 128* 129* 131* 130* 131* 134* 132*   POTASSIUM 4.4 3.8 4.0 4.2 4.1 4.0 4.2 4.6  4.6   CHLORIDE 91* 87* 91* 93* 93* 95* 98 97*   CO2 32.0* 29.0 30.0* 33.0* 31.0* 30.0* 29.0 25.0   ANION GAP 7.0 12.0 8.0 5.0 6.0 6.0 7.0 10.0   BUN 6* 5* 5* 5* 4* 4* 3* 3*   CREATININE 0.28* 0.23* 0.24* 0.26* 0.22* 0.26* 0.19* 0.26*   EGFR 121.4 127.3 126.0 123.6 128.6 123.6 133.3 123.6   GLUCOSE 98 96 115* 105* 93 98 99 115*   CALCIUM 9.3 8.9 8.4* 9.2 9.1 8.5* 8.8 8.8   MAGNESIUM  --   --   --   --  2.1 2.0 2.2 1.8   PHOSPHORUS  --   --   --   --   --   --  3.6 1.8*         Lab 01/11/25  1842   TOTAL PROTEIN 5.9*   ALBUMIN 3.0*   GLOBULIN 2.9   ALT (SGPT) 51*   AST (SGOT) 101*   BILIRUBIN <0.2   ALK PHOS 434*         Lab 01/11/25  2135 01/11/25  1842   HSTROP T 14* 12               Lab 01/17/25  0413   IRON 14*   IRON SATURATION (TSAT) 8*   TIBC 176*   TRANSFERRIN 118*   FERRITIN 109.00         Brief Urine Lab Results  (Last result in the past 365 days)        Color   Clarity   Blood   Leuk Est   Nitrite   Protein   CREAT   Urine HCG        01/10/25 0730 Yellow   Clear   Negative   Negative   Negative   Negative                   Microbiology Results Abnormal       None            No radiology results from the last 24 hrs    Results for orders placed during the hospital encounter of 01/09/25    Adult Transthoracic Echo Complete W/ Cont if Necessary Per Protocol    Interpretation Summary    Left ventricular systolic function is  hyperdynamic (EF > 70%).    No hemodynamically significant valvular disease present.    The right ventricular cavity is mildly dilated.    Estimated right ventricular systolic pressure from tricuspid regurgitation is normal (<35 mmHg).    There is a left pleural effusion.      Current medications:  Scheduled Meds:Pharmacy Consult, , Not Applicable, BID  apixaban, 5 mg, Oral, Q12H  dilTIAZem CD, 180 mg, Oral, Q24H  ferric gluconate, 125 mg, Intravenous, Daily  First Mouthwash (Magic Mouthwash), 5 mL, Swish & Spit, Q6H  Fluticasone Furoate-Vilanterol, 1 puff, Inhalation, Daily - RT  ipratropium-albuterol, 3 mL, Nebulization, 4x Daily - RT  lactated ringers, 500 mL, Intravenous, Once  melatonin, 5 mg, Oral, Nightly  metoprolol tartrate, 25 mg, Oral, Q12H  minocycline, 100 mg, Oral, Q12H  mirtazapine, 15 mg, Oral, Nightly  pantoprazole, 40 mg, Oral, Nightly  Pharmacy Meds to Bed Consult, , Not Applicable, Daily  predniSONE, 20 mg, Oral, Daily With Breakfast  sodium chloride, 10 mL, Intravenous, Q12H      Continuous Infusions:Pharmacy Consult,       PRN Meds:.  acetaminophen **OR** acetaminophen **OR** acetaminophen    Albuterol Sulfate NEB Orderable    benzonatate    senna-docusate sodium **AND** polyethylene glycol **AND** bisacodyl **AND** bisacodyl    Calcium Replacement - Follow Nurse / BPA Driven Protocol    Halls Cough Drops    Hydrocod Varun-Chlorphe Varun ER    LORazepam    Magnesium Standard Dose Replacement - Follow Nurse / BPA Driven Protocol    nitroglycerin    Pharmacy Consult    phenol    Phosphorus Replacement - Follow Nurse / BPA Driven Protocol    Potassium Replacement - Follow Nurse / BPA Driven Protocol    sodium chloride    sodium chloride    sodium chloride    Assessment & Plan   Assessment & Plan     Active Hospital Problems    Diagnosis  POA    **Multifocal pneumonia [J18.9]  Yes    Atrial tachycardia [I47.19]  No    Severe protein-calorie malnutrition [E43]  Yes    Atrial fibrillation/flutter  [I48.91]  Yes    Heart failure with improved ejection fraction (HFimpEF) [I50.32]  Yes    Aortic atherosclerosis [I70.0]  Yes    Emphysema of lung [J43.9]  Yes    Asthma-COPD overlap syndrome [J44.89]  Yes    Anxiety disorder [F41.9]  Yes    Hyponatremia [E87.1]  Yes    Hypokalemia [E87.6]  Yes    Protein calorie malnutrition [E46]  Yes    Cachexia [R64]  Yes    End stage COPD [J44.9]  Yes    Hypothyroidism (acquired) [E03.9]  Yes    Mycobacterium avium complex [A31.0]  Yes    Chronic bronchitis [J42]  Yes    Mycobacterium infection [A31.9]  Yes      Resolved Hospital Problems    Diagnosis Date Resolved POA    Pneumonia [J18.9] 01/11/2025 Yes    T2DM (type 2 diabetes mellitus) [E11.9] 01/11/2025 Yes        Brief Hospital Course to date:  Kristen Jean is a 64 y.o. female with a PMH significant for pulmonary Mycobacterium avium complex diagnosed 2015, end-stage COPD, anxiety and protein calorie malnutrition with cachexia who presented to [facility] ED secondary to worsening shortness of breath, higher oxygen requirements, right-sided pleuritic chest pain, was found to have multifocal PNA.  Pulmonology consulted and recommended extended steroid course.  Infectious is consulted and assisting with antibiotic therapy.  Patient initially started on Rocephin and minocycline.  Patient will continue p.o. minocycline at this time.     Pneumonia  Multifocal pneumonia  Mycobacterium avium complex  End-stage COPD  Asthma - COPD overlap syndrome  Emphysema of the lung  Chronic bronchitis  - Patient has a history that is significant for advanced COPD with a history of asthma - COPD overlap syndrome, follows [facility] pulmonology  - Does have a history of Mycobacterium AVM complex, appears on CT that her previous RLL nodule has decreased in size  - CT chest without contrast revealed multifocal pneumonia more pronounced RML/RLL  - MRSA swab, streptococcal Legionella urine antigens negative, respiratory panel  negative  -Consulted, initially on Rocephin and minocycline.  Will discontinue Rocephin and continue minocycline for 10 days total.  Patient will need to follow-up with infectious disease at [facility].  -Pulmonology consulted, appreciate recs.  Recommended Breo inhaler, scheduled DuoNebs, scheduled albuterol nebs, and extended course of steroids.  - DuoNebs, Tessalon Perles and albuterol inhaler. Continue steroids.   - Continue nasal cannula oxygen and wean as tolerated     Hyponatremia, stable   Hypokalemia, resolved  --Suspect SIADH in the setting of pneumonia versus hypovolemic hyponatremia  - s/p IVFs  -- Na+ stable  - Potassium replacement protocol in place    Thrombocytosis  -Hematology consulted  -Obtaining JAK2 mutation     New Onset Afib with RVR  ?SVT  -- consult Cardiology given new diagnosis, appreciate their assistance  -- started on Eliquis, [id number]  -- given IV digoxin but remained tachycardic   -- EKG repeated and concern for SVT   -- pt refused IV Adenosine initially, however, eventually agreed. Briefly HR was better, but then went back to 160s.  Resumed dilt gtt at that point.   -- now off dilt gtt and in NSR  -- continue with Metoprolol and PO Diltiazem as per Cardiology  -- TTE with normal EF      Protein calorie malnutrition  Cachexia of end-stage lung disease  --Patient has low albumin, appears cachectic likely secondary to advanced lung disease  - Nutrition consult for assessment of malnutrition  -- Started Remeron for appetite stimulation      Goals of care  -- pt still full code for time being.  Will continue to discuss with her given her poor overall prognosis      Low TSH  -- Likely subclinical thyroiditis with low TSH and normal free T4, in the setting of pneumonia  -- Will need repeat of thyroid function in 6 to 8 weeks.     T2DM/prediabetes  - HbA1c 6%   - LDL 88     Anxiety  --  reordered her benzo, she requested 0.25 mg QID (vs 0.5 mg BID at home).      Expected  Discharge Location and Transportation: TBD  Expected Discharge   Expected Discharge Date: 1/16/2025; Expected Discharge Time:      VTE Prophylaxis:  Pharmacologic & mechanical VTE prophylaxis orders are present.         AM-PAC 6 Clicks Score (PT): 19 (01/18/25 0836)    CODE STATUS:   Code Status and Medical Interventions: CPR (Attempt to Resuscitate); Full Support   Ordered at: 01/09/25 2009     Level Of Support Discussed With:    Patient     Code Status (Patient has no pulse and is not breathing):    CPR (Attempt to Resuscitate)     Medical Interventions (Patient has pulse or is breathing):    Full Support       Wojciech Stone DO  01/18/25

## 2025-01-19 PROCEDURE — 99232 SBSQ HOSP IP/OBS MODERATE 35: CPT | Performed by: STUDENT IN AN ORGANIZED HEALTH CARE EDUCATION/TRAINING PROGRAM

## 2025-01-19 PROCEDURE — 25010000002 NA FERRIC GLUC CPLX PER 12.5 MG: Performed by: INTERNAL MEDICINE

## 2025-01-19 PROCEDURE — 94799 UNLISTED PULMONARY SVC/PX: CPT

## 2025-01-19 PROCEDURE — 63710000001 PREDNISONE PER 1 MG: Performed by: INTERNAL MEDICINE

## 2025-01-19 PROCEDURE — 94664 DEMO&/EVAL PT USE INHALER: CPT

## 2025-01-19 RX ORDER — BUDESONIDE AND FORMOTEROL FUMARATE DIHYDRATE 160; 4.5 UG/1; UG/1
2 AEROSOL RESPIRATORY (INHALATION)
Status: DISCONTINUED | OUTPATIENT
Start: 2025-01-19 | End: 2025-01-20 | Stop reason: HOSPADM

## 2025-01-19 RX ORDER — ALBUTEROL SULFATE 0.83 MG/ML
2.5 SOLUTION RESPIRATORY (INHALATION)
Status: DISCONTINUED | OUTPATIENT
Start: 2025-01-19 | End: 2025-01-20 | Stop reason: HOSPADM

## 2025-01-19 RX ORDER — CLOTRIMAZOLE 10 MG/1
10 LOZENGE ORAL
Status: DISCONTINUED | OUTPATIENT
Start: 2025-01-19 | End: 2025-01-20 | Stop reason: HOSPADM

## 2025-01-19 RX ADMIN — ALBUTEROL SULFATE 2.5 MG: 2.5 SOLUTION RESPIRATORY (INHALATION) at 20:45

## 2025-01-19 RX ADMIN — MIRTAZAPINE 15 MG: 15 TABLET, FILM COATED ORAL at 20:36

## 2025-01-19 RX ADMIN — LORAZEPAM 0.25 MG: 0.5 TABLET ORAL at 08:18

## 2025-01-19 RX ADMIN — ALBUTEROL SULFATE 2.5 MG: 2.5 SOLUTION RESPIRATORY (INHALATION) at 12:42

## 2025-01-19 RX ADMIN — ALBUTEROL SULFATE 2.5 MG: 2.5 SOLUTION RESPIRATORY (INHALATION) at 06:29

## 2025-01-19 RX ADMIN — PANTOPRAZOLE SODIUM 40 MG: 40 TABLET, DELAYED RELEASE ORAL at 20:35

## 2025-01-19 RX ADMIN — LIDOCAINE HYDROCHLORIDE 5 ML: 20 SOLUTION OROPHARYNGEAL at 01:06

## 2025-01-19 RX ADMIN — SODIUM CHLORIDE 125 MG: 9 INJECTION, SOLUTION INTRAVENOUS at 10:42

## 2025-01-19 RX ADMIN — MINOCYCLINE HYDROCHLORIDE 100 MG: 50 CAPSULE ORAL at 08:12

## 2025-01-19 RX ADMIN — PHENOL 1 SPRAY: 1.5 LIQUID ORAL at 08:13

## 2025-01-19 RX ADMIN — Medication 5 MG: at 20:35

## 2025-01-19 RX ADMIN — CLOTRIMAZOLE 10 MG: 10 LOZENGE ORAL at 21:45

## 2025-01-19 RX ADMIN — Medication 10 ML: at 20:38

## 2025-01-19 RX ADMIN — BUDESONIDE AND FORMOTEROL FUMARATE DIHYDRATE 2 PUFF: 160; 4.5 AEROSOL RESPIRATORY (INHALATION) at 20:45

## 2025-01-19 RX ADMIN — PREDNISONE 20 MG: 20 TABLET ORAL at 08:12

## 2025-01-19 RX ADMIN — MINOCYCLINE HYDROCHLORIDE 100 MG: 50 CAPSULE ORAL at 20:36

## 2025-01-19 RX ADMIN — APIXABAN 5 MG: 5 TABLET, FILM COATED ORAL at 08:12

## 2025-01-19 RX ADMIN — LORAZEPAM 0.25 MG: 0.5 TABLET ORAL at 20:37

## 2025-01-19 RX ADMIN — APIXABAN 5 MG: 5 TABLET, FILM COATED ORAL at 20:35

## 2025-01-19 NOTE — PROGRESS NOTES
[facility] Medicine Services  PROGRESS NOTE    Patient Name: Kristen Jean  : 1960  MRN: [id number]    Date of Admission: 2025  Primary Care Physician: Moiz Nova MD    Subjective   Subjective     CC:  Shortness of breath    HPI:  Patient seen resting in bedside chair in no acute distress.  Saturating well on baseline oxygen.  Plan for discharge in the morning.      Objective   Objective     Vital Signs:   Temp:  [97.2 °F (36.2 °C)-98 °F (36.7 °C)] 97.9 °F (36.6 °C)  Heart Rate:  [72-94] 87  Resp:  [16-18] 18  BP: (116-133)/(42-61) 133/52  Flow (L/min) (Oxygen Therapy):  [3-3.5] 3     Physical Exam  Constitutional:       General: She is not in acute distress.  Cardiovascular:      Rate and Rhythm: Normal rate.      Pulses: Normal pulses.   Pulmonary:      Effort: Pulmonary effort is normal. No respiratory distress.      Comments: Diminished breath sounds throughout  Abdominal:      General: There is no distension.      Palpations: Abdomen is soft.      Tenderness: There is no abdominal tenderness. There is no guarding or rebound.   Musculoskeletal:      Right lower leg: No edema.      Left lower leg: No edema.   Skin:     General: Skin is warm.   Neurological:      Mental Status: She is alert and oriented to person, place, and time.   Psychiatric:         Mood and Affect: Mood normal.         Behavior: Behavior normal.          Results Reviewed:  LAB RESULTS:      Lab 25  0350 25  0413 25  0406 01/15/25  0417 25  0427   WBC 20.60* 26.73* 29.66* 21.94* 17.07*   HEMOGLOBIN 10.3* 9.8* 9.9* 10.1* 9.8*   HEMATOCRIT 31.2* 30.2* 30.5* 31.4* 30.6*   PLATELETS 1,149* 1,106* 977* 866* 816*   NEUTROS ABS 16.11* 22.10* 24.59* 16.61* 12.23*   IMMATURE GRANS (ABS) 0.25* 0.43* 0.42* 0.37* 0.30*   LYMPHS ABS 2.15 1.43 1.97 2.49 2.28   MONOS ABS 1.13* 1.44* 1.43* 1.31* 1.32*   EOS ABS 0.91* 1.23* 1.16* 1.07* 0.88*   MCV 80.2 81.2 81.6 82.6 82.5         Lab  01/18/25  0350 01/17/25  0413 01/16/25  0406 01/15/25  0417 01/14/25  0427 01/13/25  0344   SODIUM 130* 128* 129* 131* 130* 131*   POTASSIUM 4.4 3.8 4.0 4.2 4.1 4.0   CHLORIDE 91* 87* 91* 93* 93* 95*   CO2 32.0* 29.0 30.0* 33.0* 31.0* 30.0*   ANION GAP 7.0 12.0 8.0 5.0 6.0 6.0   BUN 6* 5* 5* 5* 4* 4*   CREATININE 0.28* 0.23* 0.24* 0.26* 0.22* 0.26*   EGFR 121.4 127.3 126.0 123.6 128.6 123.6   GLUCOSE 98 96 115* 105* 93 98   CALCIUM 9.3 8.9 8.4* 9.2 9.1 8.5*   MAGNESIUM  --   --   --   --  2.1 2.0                           Lab 01/17/25  0413   IRON 14*   IRON SATURATION (TSAT) 8*   TIBC 176*   TRANSFERRIN 118*   FERRITIN 109.00         Brief Urine Lab Results  (Last result in the past 365 days)        Color   Clarity   Blood   Leuk Est   Nitrite   Protein   CREAT   Urine HCG        01/10/25 0730 Yellow   Clear   Negative   Negative   Negative   Negative                   Microbiology Results Abnormal       None            No radiology results from the last 24 hrs    Results for orders placed during the hospital encounter of 01/09/25    Adult Transthoracic Echo Complete W/ Cont if Necessary Per Protocol    Interpretation Summary    Left ventricular systolic function is hyperdynamic (EF > 70%).    No hemodynamically significant valvular disease present.    The right ventricular cavity is mildly dilated.    Estimated right ventricular systolic pressure from tricuspid regurgitation is normal (<35 mmHg).    There is a left pleural effusion.      Current medications:  Scheduled Meds:Pharmacy Consult, , Not Applicable, BID  albuterol, 2.5 mg, Nebulization, Q6H - RT  apixaban, 5 mg, Oral, Q12H  budesonide-formoterol, 2 puff, Inhalation, BID - RT  dilTIAZem CD, 180 mg, Oral, Q24H  ferric gluconate, 125 mg, Intravenous, Daily  First Mouthwash (Magic Mouthwash), 5 mL, Swish & Spit, Q6H  melatonin, 5 mg, Oral, Nightly  metoprolol tartrate, 25 mg, Oral, Q12H  minocycline, 100 mg, Oral, Q12H  mirtazapine, 15 mg, Oral,  Nightly  pantoprazole, 40 mg, Oral, Nightly  Pharmacy Meds to Bed Consult, , Not Applicable, Daily  sodium chloride, 10 mL, Intravenous, Q12H      Continuous Infusions:Pharmacy Consult,       PRN Meds:.  acetaminophen **OR** acetaminophen **OR** acetaminophen    Albuterol Sulfate NEB Orderable    benzonatate    senna-docusate sodium **AND** polyethylene glycol **AND** bisacodyl **AND** bisacodyl    Calcium Replacement - Follow Nurse / BPA Driven Protocol    Halls Cough Drops    Hydrocod Varun-Chlorphe Varun ER    LORazepam    Magnesium Standard Dose Replacement - Follow Nurse / BPA Driven Protocol    nitroglycerin    Pharmacy Consult    phenol    Phosphorus Replacement - Follow Nurse / BPA Driven Protocol    Potassium Replacement - Follow Nurse / BPA Driven Protocol    sodium chloride    sodium chloride    sodium chloride    Assessment & Plan   Assessment & Plan     Active Hospital Problems    Diagnosis  POA    **Multifocal pneumonia [J18.9]  Yes    Atrial tachycardia [I47.19]  No    Severe protein-calorie malnutrition [E43]  Yes    Atrial fibrillation/flutter [I48.91]  Yes    Heart failure with improved ejection fraction (HFimpEF) [I50.32]  Yes    Aortic atherosclerosis [I70.0]  Yes    Emphysema of lung [J43.9]  Yes    Asthma-COPD overlap syndrome [J44.89]  Yes    Anxiety disorder [F41.9]  Yes    Hyponatremia [E87.1]  Yes    Hypokalemia [E87.6]  Yes    Protein calorie malnutrition [E46]  Yes    Cachexia [R64]  Yes    End stage COPD [J44.9]  Yes    Hypothyroidism (acquired) [E03.9]  Yes    Mycobacterium avium complex [A31.0]  Yes    Chronic bronchitis [J42]  Yes    Mycobacterium infection [A31.9]  Yes      Resolved Hospital Problems    Diagnosis Date Resolved POA    Pneumonia [J18.9] 01/11/2025 Yes    T2DM (type 2 diabetes mellitus) [E11.9] 01/11/2025 Yes        Brief Hospital Course to date:  Kristen Jean is a 64 y.o. female with a PMH significant for pulmonary Mycobacterium avium complex diagnosed 2015, end-stage  COPD, anxiety and protein calorie malnutrition with cachexia who presented to [facility] ED secondary to worsening shortness of breath, higher oxygen requirements, right-sided pleuritic chest pain, was found to have multifocal PNA.  Pulmonology consulted and recommended extended steroid course.  Infectious is consulted and assisted with antibiotic therapy.  Patient initially started on Rocephin and minocycline.  Patient will continue p.o. minocycline at this time.     Pneumonia  Multifocal pneumonia  Mycobacterium avium complex  End-stage COPD  Asthma - COPD overlap syndrome  Emphysema of the lung  Chronic bronchitis  - Patient has a history that is significant for advanced COPD with a history of asthma - COPD overlap syndrome, follows [facility] pulmonology  - Does have a history of Mycobacterium AVM complex, appears on CT that her previous RLL nodule has decreased in size  - CT chest without contrast revealed multifocal pneumonia more pronounced RML/RLL  - MRSA swab, streptococcal Legionella urine antigens negative, respiratory panel negative  PLAN  -ID consulted, initially on Rocephin and minocycline.  Will discontinue Rocephin and continue minocycline for 10 days total.  Patient will need to follow-up with infectious disease at [facility] (appt on 2/3/25)  -Pulmonology consulted, appreciate recs.  Recommended Breo inhaler, scheduled DuoNebs, scheduled albuterol nebs, and extended course of steroids. Pulmonology follow up on 2/5/25.   - Continue nasal cannula oxygen and wean as tolerated     Hyponatremia, stable   Hypokalemia, resolved  --Suspect SIADH in the setting of pneumonia versus hypovolemic hyponatremia  - s/p IVFs  -- Na+ stable  - Potassium replacement protocol in place    NAKIA and Thrombocytosis  -Hematology consulted  --s/p IV iron  -Obtaining JAK2 mutation  -Will need outpatient referral to hematology for follow up     New Onset Afib with RVR  ?SVT  -- consulted  Cardiology given new diagnosis, appreciate their assistance  -- started on Eliquis, [id number]  -- given IV digoxin but remained tachycardic   -- EKG repeated and concern for SVT   -- pt refused IV Adenosine initially, however, eventually agreed. Briefly HR was better, but then went back to 160s.  Resumed dilt gtt at that point.   -- now off dilt gtt and in NSR  -- continue with Metoprolol and PO Diltiazem as per Cardiology  -- TTE with normal EF      Protein calorie malnutrition  Cachexia of end-stage lung disease  --Patient has low albumin, appears cachectic likely secondary to advanced lung disease  - Nutrition consult for assessment of malnutrition  -- Started Remeron for appetite stimulation      Goals of care  -- pt still full code       Low TSH  -- Likely subclinical thyroiditis with low TSH and normal free T4, in the setting of pneumonia  -- Will need repeat of thyroid function in 6 to 8 weeks.     T2DM/prediabetes  - HbA1c 6%   - LDL 88     Anxiety  --  reordered her benzo, she requested 0.25 mg QID (vs 0.5 mg BID at home).      Oral thrush  - Clotrimazole troches x 7 days     Expected Discharge Location and Transportation: Home on 1/19  Expected Discharge   Expected Discharge Date: 1/16/2025; Expected Discharge Time:      VTE Prophylaxis:  Pharmacologic & mechanical VTE prophylaxis orders are present.         AM-PAC 6 Clicks Score (PT): 19 (01/19/25 0810)    CODE STATUS:   Code Status and Medical Interventions: CPR (Attempt to Resuscitate); Full Support   Ordered at: 01/09/25 2009     Level Of Support Discussed With:    Patient     Code Status (Patient has no pulse and is not breathing):    CPR (Attempt to Resuscitate)     Medical Interventions (Patient has pulse or is breathing):    Full Support       Wojciech Stone,   01/19/25

## 2025-01-19 NOTE — PLAN OF CARE
Problem: Adult Inpatient Plan of Care  Goal: Plan of Care Review  Outcome: Progressing  Goal: Patient-Specific Goal (Individualized)  Outcome: Progressing  Goal: Absence of Hospital-Acquired Illness or Injury  Outcome: Progressing  Intervention: Identify and Manage Fall Risk  Description: Perform standard risk assessment on admission using a validated tool or comprehensive approach appropriate to the patient; reassess fall risk frequently, with change in status or transfer to another level of care.  Communicate risk to interprofessional healthcare team; ensure fall risk visible cue.  Determine need for increased observation, equipment and environmental modification, as well as use of supportive, nonskid footwear.  Adjust safety measures to individual needs and identified risk factors.  Reinforce the importance of active participation with fall risk prevention, safety, and physical activity with the patient and family.  Perform regular intentional rounding to assess need for position change, pain assessment and personal needs, including assistance with toileting.  Recent Flowsheet Documentation  Taken 1/19/2025 1200 by Anabelle Suh, RN  Safety Promotion/Fall Prevention:   activity supervised   assistive device/personal items within reach   clutter free environment maintained   fall prevention program maintained   nonskid shoes/slippers when out of bed   room organization consistent   safety round/check completed  Taken 1/19/2025 0810 by Anabelle Suh, RN  Safety Promotion/Fall Prevention:   activity supervised   assistive device/personal items within reach   clutter free environment maintained   fall prevention program maintained   nonskid shoes/slippers when out of bed   room organization consistent   safety round/check completed  Intervention: Prevent Skin Injury  Description: Perform a screening for skin injury risk, such as pressure or moisture-associated skin damage on admission and at regular intervals  throughout hospital stay.  Keep all areas of skin (especially folds) clean and dry.  Maintain adequate skin hydration.  Relieve and redistribute pressure and protect bony prominences and skin at risk for injury; implement measures based on patient-specific risk factors.  Match turning and repositioning schedule to clinical condition.  Encourage weight shift frequently; assist with reposition if unable to complete independently.  Float heels off bed; avoid pressure on the Achilles tendon.  Keep skin free from extended contact with medical devices.  Optimize nutrition and hydration.  Encourage functional activity and mobility, as early as tolerated.  Use aids (e.g., slide boards, mechanical lift) during transfer.  Recent Flowsheet Documentation  Taken 1/19/2025 1200 by Anabelle Suh RN  Skin Protection:   silicone foam dressing in place   skin sealant/moisture barrier applied   incontinence pads utilized  Taken 1/19/2025 0810 by Anabelle Suh RN  Body Position:   position changed independently   lower extremity elevated   neutral body alignment   neutral head position  Skin Protection:   incontinence pads utilized   silicone foam dressing in place   protective footwear used  Intervention: Prevent Infection  Description: Maintain skin and mucous membrane integrity; promote hand, oral and pulmonary hygiene.  Optimize fluid balance, nutrition, sleep and glycemic control to maximize infection resistance.  Identify potential sources of infection early to prevent or mitigate progression of infection (e.g., wound, lines, devices).  Evaluate ongoing need for invasive devices; remove promptly when no longer indicated.  Review vaccination status.  Recent Flowsheet Documentation  Taken 1/19/2025 1200 by Anbaelle Suh RN  Infection Prevention:   environmental surveillance performed   equipment surfaces disinfected   hand hygiene promoted   personal protective equipment utilized   rest/sleep promoted   single patient room  provided  Taken 1/19/2025 0810 by Anabelle Suh RN  Infection Prevention:   environmental surveillance performed   equipment surfaces disinfected   hand hygiene promoted   personal protective equipment utilized   rest/sleep promoted   single patient room provided  Goal: Optimal Comfort and Wellbeing  Outcome: Progressing  Goal: Readiness for Transition of Care  Outcome: Progressing     Problem: Fall Injury Risk  Goal: Absence of Fall and Fall-Related Injury  Outcome: Progressing  Intervention: Identify and Manage Contributors  Description: Develop a fall prevention plan, considering patient-centered interventions and family/caregiver involvement; identify and address patient's facilitators and barriers.  Provide reorientation, appropriate sensory stimulation and routines with changes in mental status to decrease risk of fall.  Promote use of personal vision and auditory aids.  Assess assistance level required for safe and effective self-care; provide support as needed, such as toileting and mobilization. For age 65 and older, implement timed toileting with assistance.  Encourage physical activity, such as performance of mobility and self-care at highest level of patient ability, multicomponent exercise program and provision of appropriate assistive devices.  If fall occurs, assess the severity of injury; implement fall injury protocol. Determine the cause and revise fall injury prevention plan.  Regularly review and advocate for medication adjustment to decrease fall risk; consider administration times, polypharmacy and age.  Balance adequate pain management with potential for oversedation.  Recent Flowsheet Documentation  Taken 1/19/2025 1200 by Anabelle Suh RN  Medication Review/Management: medications reviewed  Taken 1/19/2025 0810 by Anabelle Suh RN  Medication Review/Management: medications reviewed  Intervention: Promote Injury-Free Environment  Description: Provide a safe, barrier-free environment  that encourages independent activity.  Keep care area uncluttered and well-lighted.  Determine need for increased observation or monitoring.  Avoid use of devices that minimize mobility, such as restraints or indwelling urinary catheter.  Recent Flowsheet Documentation  Taken 1/19/2025 1200 by Anabelle Suh, RN  Safety Promotion/Fall Prevention:   activity supervised   assistive device/personal items within reach   clutter free environment maintained   fall prevention program maintained   nonskid shoes/slippers when out of bed   room organization consistent   safety round/check completed  Taken 1/19/2025 0810 by Anabelle Suh, RN  Safety Promotion/Fall Prevention:   activity supervised   assistive device/personal items within reach   clutter free environment maintained   fall prevention program maintained   nonskid shoes/slippers when out of bed   room organization consistent   safety round/check completed   Goal Outcome Evaluation:

## 2025-01-19 NOTE — CASE MANAGEMENT/SOCIAL WORK
Continued Stay Note  [facility]     Patient Name: Kristen Jean  MRN: [id number]  Today's Date: 1/19/2025    Admit Date: 1/9/2025    Plan: Home   Discharge Plan       Row Name 01/19/25 1215       Plan    Plan Home    Patient/Family in Agreement with Plan yes    Plan Comments Received call back from [employer]. Wheelchair transport arranged for 1030 on Monday, 1/20.    Final Discharge Disposition Code 01 - home or self-care      Row Name 01/19/25 1208       Plan    Plan Home    Patient/Family in Agreement with Plan yes    Plan Comments  left a message for [facility] at [phone] to arrange wheelchair transportation for Monday morning. Awaiting a call back.    Final Discharge Disposition Code 01 - home or self-care                   Discharge Codes    No documentation.                 Expected Discharge Date and Time       Expected Discharge Date Expected Discharge Time    Jan 16, 2025               Sara Rae RN

## 2025-01-19 NOTE — CASE MANAGEMENT/SOCIAL WORK
Continued Stay Note  [facility]     Patient Name: Kristen Jean  MRN: [id number]  Today's Date: 1/19/2025    Admit Date: 1/9/2025    Plan: Home   Discharge Plan       Row Name 01/19/25 1208       Plan    Plan Home    Patient/Family in Agreement with Plan yes    Plan Comments CM left a message for [employer] at [phone] to arrange wheelchair transportation for Monday morning. Awaiting a call back.    Final Discharge Disposition Code 01 - home or self-care                   Discharge Codes    No documentation.                 Expected Discharge Date and Time       Expected Discharge Date Expected Discharge Time    Jan 16, 2025               Sara Rae RN

## 2025-01-20 ENCOUNTER — READMISSION MANAGEMENT (OUTPATIENT)
Dept: CALL CENTER | Facility: HOSPITAL | Age: 65
End: 2025-01-20
Payer: COMMERCIAL

## 2025-01-20 VITALS
SYSTOLIC BLOOD PRESSURE: 137 MMHG | WEIGHT: 103 LBS | HEIGHT: 66 IN | RESPIRATION RATE: 17 BRPM | OXYGEN SATURATION: 98 % | DIASTOLIC BLOOD PRESSURE: 53 MMHG | HEART RATE: 111 BPM | BODY MASS INDEX: 16.55 KG/M2 | TEMPERATURE: 97 F

## 2025-01-20 PROBLEM — E87.6 HYPOKALEMIA: Status: RESOLVED | Noted: 2025-01-09 | Resolved: 2025-01-20

## 2025-01-20 LAB
QT INTERVAL: 418 MS
QTC INTERVAL: 514 MS

## 2025-01-20 PROCEDURE — 94799 UNLISTED PULMONARY SVC/PX: CPT

## 2025-01-20 PROCEDURE — 94664 DEMO&/EVAL PT USE INHALER: CPT

## 2025-01-20 PROCEDURE — 94761 N-INVAS EAR/PLS OXIMETRY MLT: CPT

## 2025-01-20 PROCEDURE — 99239 HOSP IP/OBS DSCHRG MGMT >30: CPT | Performed by: STUDENT IN AN ORGANIZED HEALTH CARE EDUCATION/TRAINING PROGRAM

## 2025-01-20 RX ORDER — CLOTRIMAZOLE 10 MG/1
10 LOZENGE ORAL
Qty: 33 TABLET | Refills: 0 | Status: SHIPPED | OUTPATIENT
Start: 2025-01-20 | End: 2025-01-27

## 2025-01-20 RX ORDER — MINOCYCLINE HYDROCHLORIDE 100 MG/1
100 CAPSULE ORAL EVERY 12 HOURS SCHEDULED
Qty: 2 CAPSULE | Refills: 0 | Status: SHIPPED | OUTPATIENT
Start: 2025-01-20 | End: 2025-01-21

## 2025-01-20 RX ORDER — MIRTAZAPINE 15 MG/1
15 TABLET, FILM COATED ORAL NIGHTLY
Qty: 30 TABLET | Refills: 0 | Status: SHIPPED | OUTPATIENT
Start: 2025-01-20 | End: 2025-02-19

## 2025-01-20 RX ADMIN — DILTIAZEM HYDROCHLORIDE 180 MG: 180 CAPSULE, EXTENDED RELEASE ORAL at 09:49

## 2025-01-20 RX ADMIN — METOPROLOL TARTRATE 25 MG: 25 TABLET, FILM COATED ORAL at 09:48

## 2025-01-20 RX ADMIN — ALBUTEROL SULFATE 2.5 MG: 2.5 SOLUTION RESPIRATORY (INHALATION) at 01:52

## 2025-01-20 RX ADMIN — ALBUTEROL SULFATE 2.5 MG: 2.5 SOLUTION RESPIRATORY (INHALATION) at 06:57

## 2025-01-20 RX ADMIN — Medication: at 09:49

## 2025-01-20 RX ADMIN — BUDESONIDE AND FORMOTEROL FUMARATE DIHYDRATE 2 PUFF: 160; 4.5 AEROSOL RESPIRATORY (INHALATION) at 10:28

## 2025-01-20 RX ADMIN — MINOCYCLINE HYDROCHLORIDE 100 MG: 50 CAPSULE ORAL at 09:48

## 2025-01-20 RX ADMIN — CLOTRIMAZOLE 10 MG: 10 LOZENGE ORAL at 06:31

## 2025-01-20 RX ADMIN — APIXABAN 5 MG: 5 TABLET, FILM COATED ORAL at 09:48

## 2025-01-20 NOTE — CASE MANAGEMENT/SOCIAL WORK
Case Management Discharge Note      Final Note: Met with Ms. Jean at the bedside to finalize discharge plan. She is returning home on [employer] wheelchair van. They will pick patient up at the bedside. No other discharge needs were identified.         Selected Continued Care - Admitted Since 1/9/2025       Destination    No services have been selected for the patient.                Durable Medical Equipment    No services have been selected for the patient.                Dialysis/Infusion    No services have been selected for the patient.                Home Medical Care    No services have been selected for the patient.                Therapy    No services have been selected for the patient.                Community Resources    No services have been selected for the patient.                Community & DME    No services have been selected for the patient.                    Transportation Services  W/C Van: Other ([employer])    Final Discharge Disposition Code: 01 - home or self-care

## 2025-01-20 NOTE — DISCHARGE SUMMARY
[facility] Hospital Medicine Services  DISCHARGE SUMMARY    Patient Name: Kristen Jean  : 1960  MRN: [id number]    Date of Admission: 2025  4:06 PM  Date of Discharge:  25  Primary Care Physician: Moiz Nova MD    Consults       Date and Time Order Name Status Description    2025  6:15 AM Inpatient Hematology & Oncology Consult Completed     2025 10:00 AM Inpatient Cardiology Consult Completed     2025 11:45 PM Inpatient Pulmonology Consult Completed     2025  8:31 PM Inpatient Infectious Diseases Consult Completed             Hospital Course     Presenting Problem: Shortness of breath    Active Hospital Problems    Diagnosis  POA    **Multifocal pneumonia [J18.9]  Yes    Atrial tachycardia [I47.19]  No    Severe protein-calorie malnutrition [E43]  Yes    Atrial fibrillation/flutter [I48.91]  Yes    Heart failure with improved ejection fraction (HFimpEF) [I50.32]  Yes    Aortic atherosclerosis [I70.0]  Yes    Emphysema of lung [J43.9]  Yes    Asthma-COPD overlap syndrome [J44.89]  Yes    Anxiety disorder [F41.9]  Yes    Hyponatremia [E87.1]  Yes    Protein calorie malnutrition [E46]  Yes    Cachexia [R64]  Yes    End stage COPD [J44.9]  Yes    Hypothyroidism (acquired) [E03.9]  Yes    Mycobacterium avium complex [A31.0]  Yes    Chronic bronchitis [J42]  Yes    Mycobacterium infection [A31.9]  Yes      Resolved Hospital Problems    Diagnosis Date Resolved POA    Pneumonia [J18.9] 2025 Yes    Hypokalemia [E87.6] 2025 Yes    T2DM (type 2 diabetes mellitus) [E11.9] 2025 Yes        Hospital Course:  Kristen Jean is a 64 y.o. female with a PMH significant for pulmonary Mycobacterium avium complex diagnosed , end-stage COPD, anxiety and protein calorie malnutrition with cachexia who presented to [facility] ED secondary to worsening shortness of breath, higher oxygen requirements, right-sided pleuritic chest pain, was found to  have multifocal PNA.  Pulmonology consulted and recommended extended steroid course.  Infectious disease consulted and assisted with antibiotic therapy.  Patient initially started on Rocephin and minocycline.  Patient will continue p.o. minocycline upon discharge.  Patient to follow-up with infectious disease at [facility] in 2/3/2025.  Patient to follow-up with PCP in 1 week.  Follow-up with hematology.  Further details documented below.    Pneumonia  Multifocal pneumonia  Mycobacterium avium complex  End-stage COPD  Asthma - COPD overlap syndrome  Emphysema of the lung  Chronic bronchitis  - Patient has a history that is significant for advanced COPD with a history of asthma - COPD overlap syndrome, follows [facility] pulmonology  - Does have a history of Mycobacterium AVM complex, appears on CT that her previous RLL nodule has decreased in size  - CT chest without contrast revealed multifocal pneumonia more pronounced RML/RLL  - MRSA swab, streptococcal Legionella urine antigens negative, respiratory panel negative  PLAN  -ID consulted, initially on Rocephin and minocycline.  Will discontinue Rocephin and continue minocycline for 10 days total.  Patient will need to follow-up with infectious disease at [facility] (appt on 2/3/25)  -Pulmonology consulted, appreciate recs.  Recommended extended course of steroids.  Continue home inhalers. Pulmonology follow up on 2/5/25.   - Continue nasal cannula oxygen      Hyponatremia, stable   Hypokalemia, resolved  --Suspect SIADH in the setting of pneumonia versus hypovolemic hyponatremia  - s/p IVFs  -- Na+ stable     NAKIA and Thrombocytosis  -Hematology consulted  --s/p IV iron  -Obtaining JAK2 mutation  -Will need outpatient referral to hematology for follow up     New Onset Afib with RVR  -- consulted Cardiology given new diagnosis, appreciate their assistance  -- started on EliquTEODORO [id number]  -- continue with Metoprolol and PO Diltiazem  as per Cardiology  -- TTE with normal EF      Protein calorie malnutrition  Cachexia of end-stage lung disease  --Patient has low albumin, appears cachectic likely secondary to advanced lung disease  - Nutrition consult for assessment of malnutrition  -- Started Remeron for appetite stimulation      Goals of care  -- pt still full code       Low TSH  -- Likely subclinical thyroiditis with low TSH and normal free T4, in the setting of pneumonia  -- Will need repeat of thyroid function in 6 to 8 weeks.     T2DM/prediabetes  - HbA1c 6%   - LDL 88     Anxiety  -- Continue home medication     Oral thrush  - Clotrimazole troches x 7 days       Discharge Follow Up Recommendations for outpatient labs/diagnostics:  Continue oral antibiotics as prescribed.  Continue home inhalers and home oxygen.  Follow-up with PCP in 1 week.  Follow-up with infectious disease at the [facility] scheduled.  Follow-up with hematology    Day of Discharge     HPI:   Patient seen resting in bed in no acute distress.  Tolerating baseline home oxygen nasal cannula.  Discussed plan as documented above.  All questions answered.        Vital Signs:   Temp:  [97 °F (36.1 °C)-98.5 °F (36.9 °C)] 97 °F (36.1 °C)  Heart Rate:  [] 104  Resp:  [17-20] 17  BP: (107-137)/(51-54) 137/53  Flow (L/min) (Oxygen Therapy):  [3-5] 4      Physical Exam  Constitutional:       General: She is not in acute distress.  Cardiovascular:      Rate and Rhythm: Tachycardia present.      Pulses: Normal pulses.   Pulmonary:      Effort: Pulmonary effort is normal. No respiratory distress.      Comments: Diminished breath sounds  Abdominal:      General: There is no distension.      Palpations: Abdomen is soft.      Tenderness: There is no abdominal tenderness. There is no guarding or rebound.   Musculoskeletal:      Right lower leg: No edema.      Left lower leg: No edema.   Skin:     General: Skin is warm.   Neurological:      Mental Status: She is alert and  oriented to person, place, and time.   Psychiatric:         Mood and Affect: Mood normal.         Behavior: Behavior normal.          Pertinent  and/or Most Recent Results     LAB RESULTS:      Lab 01/18/25  0350 01/17/25  0413 01/16/25  0406 01/15/25  0417 01/14/25  0427   WBC 20.60* 26.73* 29.66* 21.94* 17.07*   HEMOGLOBIN 10.3* 9.8* 9.9* 10.1* 9.8*   HEMATOCRIT 31.2* 30.2* 30.5* 31.4* 30.6*   PLATELETS 1,149* 1,106* 977* 866* 816*   NEUTROS ABS 16.11* 22.10* 24.59* 16.61* 12.23*   IMMATURE GRANS (ABS) 0.25* 0.43* 0.42* 0.37* 0.30*   LYMPHS ABS 2.15 1.43 1.97 2.49 2.28   MONOS ABS 1.13* 1.44* 1.43* 1.31* 1.32*   EOS ABS 0.91* 1.23* 1.16* 1.07* 0.88*   MCV 80.2 81.2 81.6 82.6 82.5         Lab 01/18/25  0350 01/17/25  0413 01/16/25  0406 01/15/25  0417 01/14/25  0427   SODIUM 130* 128* 129* 131* 130*   POTASSIUM 4.4 3.8 4.0 4.2 4.1   CHLORIDE 91* 87* 91* 93* 93*   CO2 32.0* 29.0 30.0* 33.0* 31.0*   ANION GAP 7.0 12.0 8.0 5.0 6.0   BUN 6* 5* 5* 5* 4*   CREATININE 0.28* 0.23* 0.24* 0.26* 0.22*   EGFR 121.4 127.3 126.0 123.6 128.6   GLUCOSE 98 96 115* 105* 93   CALCIUM 9.3 8.9 8.4* 9.2 9.1   MAGNESIUM  --   --   --   --  2.1                     Lab 01/17/25 0413   IRON 14*   IRON SATURATION (TSAT) 8*   TIBC 176*   TRANSFERRIN 118*   FERRITIN 109.00         Brief Urine Lab Results  (Last result in the past 365 days)        Color   Clarity   Blood   Leuk Est   Nitrite   Protein   CREAT   Urine HCG        01/10/25 0730 Yellow   Clear   Negative   Negative   Negative   Negative                 Microbiology Results (last 10 days)       Procedure Component Value - Date/Time    Respiratory Panel PCR w/COVID-19(SARS-CoV-2) MARIANELA/[facility]/LUZ MARINA/PAD/COR/KINGS In-House, NP Swab in UTM/VTM, 2 HR TAT - Swab, Nasopharynx [[id number]]  (Normal) Collected: 01/16/25 1345    Lab Status: Final result Specimen: Swab from Nasopharynx Updated: 01/16/25 1452     ADENOVIRUS, PCR Not Detected     Coronavirus 229E Not Detected     Coronavirus HKU1 Not  Detected     Coronavirus NL63 Not Detected     Coronavirus OC43 Not Detected     COVID19 Not Detected     Human Metapneumovirus Not Detected     Human Rhinovirus/Enterovirus Not Detected     Influenza A PCR Not Detected     Influenza B PCR Not Detected     Parainfluenza Virus 1 Not Detected     Parainfluenza Virus 2 Not Detected     Parainfluenza Virus 3 Not Detected     Parainfluenza Virus 4 Not Detected     RSV, PCR Not Detected     Bordetella pertussis pcr Not Detected     Bordetella parapertussis PCR Not Detected     Chlamydophila pneumoniae PCR Not Detected     Mycoplasma pneumo by PCR Not Detected    Narrative:      In the setting of a positive respiratory panel with a viral infection PLUS a negative procalcitonin without other underlying concern for bacterial infection, consider observing off antibiotics or discontinuation of antibiotics and continue supportive care. If the respiratory panel is positive for atypical bacterial infection (Bordetella pertussis, Chlamydophila pneumoniae, or Mycoplasma pneumoniae), consider antibiotic de-escalation to target atypical bacterial infection.    Beta Strep Culture, Throat - Swab, Throat [[id number]]  (Normal) Collected: 01/15/25 1333    Lab Status: Final result Specimen: Swab from Throat Updated: 01/17/25 0955     Throat Culture, Beta Strep No Beta Hemolytic Streptococcus Isolated    Narrative:      Group A Strep incidence is low in adults. Positive culture for Beta hemolytic Streptococcus species can reflect colonization and not true infection. Please correlate clinically.      MRSA Screen, PCR (Inpatient) - Swab, Nares [[id number]]  (Normal) Collected: 01/10/25 1435    Lab Status: Final result Specimen: Swab from Nares Updated: 01/10/25 1607     MRSA PCR Negative    Narrative:      The negative predictive value of this diagnostic test is high and should only be used to consider de-escalating anti-MRSA therapy. A positive result may indicate colonization with MRSA and  must be correlated clinically.  MRSA Negative            CT Chest Without Contrast Diagnostic    Result Date: 1/9/2025  CT CHEST WO CONTRAST DIAGNOSTIC Date of Exam: 1/9/2025 5:35 PM EST Indication: cough, wheezing, right sided pain with HX of MAC. Comparison: 9/20/2022 Technique: Axial CT images were obtained of the chest without contrast administration.  Reconstructed coronal and sagittal images were also obtained. Automated exposure control and iterative construction methods were used. Findings: MEDIASTINUM: Unremarkable. Aortic and heart size are normal. No mass nor pericardial effusion. CORONARY ARTERIES: No calcified atherosclerotic disease. LUNGS: There is inferior right upper lobe and right middle lobe airspace disease consistent multifocal pneumonia. This is superimposed on a background pattern of emphysema and partially calcified opacities in both lungs which are similar. Previous 12 mm right lower lobe nodule now measures 8 mm. No new/suspicious nodule is identified. PLEURAL SPACE: No effusion, mass, nor pneumothorax. LYMPH NODES: There are no pathologically enlarged lymph nodes. UPPER ABDOMEN: Unremarkable OSSEOUS STRUCTURES: Appropriate for age with no acute process identified.     Impression: 1.Right lung multifocal pneumonia 2.Decrease in size of right lower lobe pulmonary nodule. No new suspicious nodule identified. 3.Other chronic findings as discussed. Electronically Signed: Gustavo Proctor MD  1/9/2025 6:07 PM EST  Workstation ID: [id number]    XR Chest 1 View    Result Date: 1/9/2025  XR CHEST 1 VW Date of Exam: 1/9/2025 4:50 PM EST Indication: Chest Pain Triage Protocol Comparison: CT chest dated 9/20/2022 Findings: The cardiomediastinal silhouette is within normal limits. Pulmonary vascularity appears normal. The lungs are hyperinflated with with underlying COPD/emphysema. There are interstitial opacities within the right lung likely representing infectious or inflammatory process. There is  biapical scarring. There is a bleb within the superior lateral aspect of the right upper lobe with surrounding thickened wall. This is new from prior exam. There is no pleural effusion.     Impression: 1.Hyperinflated lungs with underlying COPD/emphysema. 2.Interstitial opacities within the right lung likely representing infectious or inflammatory process. 3.New bleb within the superior lateral aspect of the right upper lobe with surrounding thickening of the wall. Electronically Signed: Pito Brennen  1/9/2025 5:14 PM EST  Workstation ID: [id number]             Results for orders placed during the hospital encounter of 01/09/25    Adult Transthoracic Echo Complete W/ Cont if Necessary Per Protocol    Interpretation Summary    Left ventricular systolic function is hyperdynamic (EF > 70%).    No hemodynamically significant valvular disease present.    The right ventricular cavity is mildly dilated.    Estimated right ventricular systolic pressure from tricuspid regurgitation is normal (<35 mmHg).    There is a left pleural effusion.      Plan for Follow-up of Pending Labs/Results: Follow up with hematology  Pending Labs       Order Current Status    JAK2 V617F, reflex to CALR + MPL + E12-15 In process          Discharge Details        Discharge Medications        New Medications        Instructions Start Date   clotrimazole 10 MG moy  Commonly known as: MYCELEX   10 mg, Oral, 5 Times Daily      dilTIAZem  MG 24 hr capsule  Commonly known as: CARDIZEM CD   180 mg, Oral, Every 24 Hours Scheduled      Eliquis 5 MG tablet tablet  Generic drug: apixaban   Take 1 tablet by mouth Every 12 (Twelve) Hours as directed for Atrial Fibrillation      metoprolol tartrate 25 MG tablet  Commonly known as: LOPRESSOR   25 mg, Oral, Every 12 Hours Scheduled      minocycline 100 MG capsule  Commonly known as: MINOCIN,DYNACIN   100 mg, Oral, Every 12 Hours Scheduled      mirtazapine 15 MG tablet  Commonly known as: REMERON   15  mg, Oral, Nightly      phenol 1.4 % liquid liquid  Commonly known as: CHLORASEPTIC   1 spray, Mouth/Throat, Every 2 Hours PRN             Continue These Medications        Instructions Start Date   Fluticasone Furoate-Vilanterol 200-25 MCG/ACT inhaler  Commonly known as: BREO ELLIPTA   1 puff, Daily - RT      ipratropium-albuterol 0.5-2.5 mg/3 ml nebulizer  Commonly known as: DUO-NEB   3 mL, 4 Times Daily PRN      LORazepam 0.5 MG tablet  Commonly known as: ATIVAN   0.5 mg, 2 Times Daily PRN      pantoprazole 40 MG EC tablet  Commonly known as: PROTONIX   40 mg, Nightly             Stop These Medications      azithromycin 500 MG tablet  Commonly known as: ZITHROMAX     rifAMPin 300 MG capsule  Commonly known as: RIFADIN              Allergies   Allergen Reactions    Codeine     Fluticasone-Salmeterol     Penicillins          Discharge Disposition:  Home or Self Care    Diet:  Hospital:  Diet Order   Procedures    Diet: Regular/House; Fluid Consistency: Thin (IDDSI 0)            Activity: As tolerated      Restrictions or Other Recommendations:  As tolerated        CODE STATUS:    Code Status and Medical Interventions: CPR (Attempt to Resuscitate); Full Support   Ordered at: 01/09/25 2009     Level Of Support Discussed With:    Patient     Code Status (Patient has no pulse and is not breathing):    CPR (Attempt to Resuscitate)     Medical Interventions (Patient has pulse or is breathing):    Full Support       Future Appointments   Date Time Provider Department Center   2/5/2025  9:45 AM [employer] TECH [facility]   2/5/2025 10:00 AM [facility], PFT LAB 1 [facility]   2/5/2025 10:30 AM Alex Estrada DO [facility]       Additional Instructions for the Follow-ups that You Need to Schedule       Ambulatory Referral to Home Health   As directed      Face to Face Visit Date: 1/15/2025   Follow-up provider for Plan of Care?: I treated the patient in an acute care  facility and will not continue treatment after discharge.   Follow-up provider: MOIZ CUEVA [[id number]]   Reason/Clinical Findings: Impaired functional mobility, endurance, balance, and gait.   Describe mobility limitations that make leaving home difficult: Impaired functional mobility, endurance, balance, and gait.   Nursing/Therapeutic Services Requested: Skilled Nursing Physical Therapy Occupational Therapy   Skilled nursing orders: Medication education Cardiopulmonary assessments   PT orders: Therapeutic exercise Gait Training Transfer training Strengthening Ultrasound Home safety assessment   Weight Bearing Status: As Tolerated   Occupational orders: Activities of daily living Energy conservation Strengthening Home safety assessment   Frequency: 1 Week 1        Discharge Follow-up with PCP   As directed       Currently Documented PCP:    Moiz Cueva MD    PCP Phone Number:    [phone]     Follow Up Details: Follow-up with PCP in 1 week        Discharge Follow-up with Specialty: Follow-up with infectious disease as scheduled at [facility].  Follow-up with primary cardiologist in 4 weeks   As directed      Specialty: Follow-up with infectious disease as scheduled at [facility].  Follow-up with primary cardiologist in 4 weeks                      Wojciech Stone DO  01/20/25      Time Spent on Discharge:  I spent  35  minutes on this discharge activity which included: face-to-face encounter with the patient, reviewing the data in the system, coordination of the care with the nursing staff as well as consultants, documentation, and entering orders.

## 2025-01-21 NOTE — OUTREACH NOTE
Prep Survey      Flowsheet Row Responses   [facility] discharged from? [facility]   Is LACE score < 7 ? No   Eligibility Readm Mgmt   Discharge diagnosis Multifocal pneumonia   Does the patient have one of the following disease processes/diagnoses(primary or secondary)? Pneumonia   Does the patient have Home health ordered? No   Is there a DME ordered? No   Prep survey completed? Yes            HUNTER MEYERS - Registered Nurse

## 2025-01-21 NOTE — PAYOR COMM NOTE
"  Ref# [id number]   Discharge Summary    CORINA Serrano, RN  Utilization Review  Phone [phone]  Fax [phone]    [place] [address]       Kristen Terrell (64 y.o. Female)       Date of Birth   1960    Social Security Number       [address]   [address] [address]    Home Phone   [phone]    MRN   [id number]       Sikh   None    Marital Status   Single                            Admission Date   25    Admission Type   Emergency    Admitting Provider   Wojciech Stone DO    Attending Provider       Department, Room/Bed   [facility] 5G, S559/1       Discharge Date   2025    Discharge Disposition   Home or Self Care    Discharge Destination                                 Attending Provider: (none)   Allergies: Codeine, Fluticasone-salmeterol, Penicillins    Isolation: None   Infection: None   Code Status: Prior    Ht: 167.6 cm (66\")   Wt: 46.7 kg (103 lb)    Admission Cmt: None   Principal Problem: Multifocal pneumonia [J18.9]                   Active Insurance as of 2025       Primary Coverage       Payor Plan Insurance Group Employer/Plan Group    [employer] [place] [employer] [place]        Payor Plan Address Payor Plan Phone Number Payor Plan Fax Number Effective Dates    [address] [address] [phone]  2016 - None Entered    [facility] [id number]         Subscriber Name Subscriber Birth Date Member ID       KRISTEN TERRELL 1960 [id number]                     Emergency Contacts        (Rel.) Home Phone Work Phone Mobile Phone    BRADYMAY (Friend) [phone] -- [phone]                 Discharge Summary        Wojciech Stone DO at 25 1025              [facility] Medicine Services  DISCHARGE SUMMARY    Patient Name: Kristen Terrell  : 1960  MRN: [id number]    Date of Admission: 2025  4:06 PM  Date of Discharge:  25  Primary Care Physician: Rohini" Moiz Galindo MD    Consults       Date and Time Order Name Status Description    1/17/2025  6:15 AM Inpatient Hematology & Oncology Consult Completed     1/11/2025 10:00 AM Inpatient Cardiology Consult Completed     1/9/2025 11:45 PM Inpatient Pulmonology Consult Completed     1/9/2025  8:31 PM Inpatient Infectious Diseases Consult Completed             Hospital Course     Presenting Problem: Shortness of breath    Active Hospital Problems    Diagnosis  POA    **Multifocal pneumonia [J18.9]  Yes    Atrial tachycardia [I47.19]  No    Severe protein-calorie malnutrition [E43]  Yes    Atrial fibrillation/flutter [I48.91]  Yes    Heart failure with improved ejection fraction (HFimpEF) [I50.32]  Yes    Aortic atherosclerosis [I70.0]  Yes    Emphysema of lung [J43.9]  Yes    Asthma-COPD overlap syndrome [J44.89]  Yes    Anxiety disorder [F41.9]  Yes    Hyponatremia [E87.1]  Yes    Protein calorie malnutrition [E46]  Yes    Cachexia [R64]  Yes    End stage COPD [J44.9]  Yes    Hypothyroidism (acquired) [E03.9]  Yes    Mycobacterium avium complex [A31.0]  Yes    Chronic bronchitis [J42]  Yes    Mycobacterium infection [A31.9]  Yes      Resolved Hospital Problems    Diagnosis Date Resolved POA    Pneumonia [J18.9] 01/11/2025 Yes    Hypokalemia [E87.6] 01/20/2025 Yes    T2DM (type 2 diabetes mellitus) [E11.9] 01/11/2025 Yes        Hospital Course:  Kristen Jean is a 64 y.o. female with a PMH significant for pulmonary Mycobacterium avium complex diagnosed 2015, end-stage COPD, anxiety and protein calorie malnutrition with cachexia who presented to [facility] ED secondary to worsening shortness of breath, higher oxygen requirements, right-sided pleuritic chest pain, was found to have multifocal PNA.  Pulmonology consulted and recommended extended steroid course.  Infectious disease consulted and assisted with antibiotic therapy.  Patient initially started on Rocephin and minocycline.  Patient will continue p.o.  minocycline upon discharge.  Patient to follow-up with infectious disease at [facility] in 2/3/2025.  Patient to follow-up with PCP in 1 week.  Follow-up with hematology.  Further details documented below.    Pneumonia  Multifocal pneumonia  Mycobacterium avium complex  End-stage COPD  Asthma - COPD overlap syndrome  Emphysema of the lung  Chronic bronchitis  - Patient has a history that is significant for advanced COPD with a history of asthma - COPD overlap syndrome, follows [facility] pulmonology  - Does have a history of Mycobacterium AVM complex, appears on CT that her previous RLL nodule has decreased in size  - CT chest without contrast revealed multifocal pneumonia more pronounced RML/RLL  - MRSA swab, streptococcal Legionella urine antigens negative, respiratory panel negative  PLAN  -ID consulted, initially on Rocephin and minocycline.  Will discontinue Rocephin and continue minocycline for 10 days total.  Patient will need to follow-up with infectious disease at [facility] (appt on 2/3/25)  -Pulmonology consulted, appreciate recs.  Recommended extended course of steroids.  Continue home inhalers. Pulmonology follow up on 2/5/25.   - Continue nasal cannula oxygen      Hyponatremia, stable   Hypokalemia, resolved  --Suspect SIADH in the setting of pneumonia versus hypovolemic hyponatremia  - s/p IVFs  -- Na+ stable     NAKIA and Thrombocytosis  -Hematology consulted  --s/p IV iron  -Obtaining JAK2 mutation  -Will need outpatient referral to hematology for follow up     New Onset Afib with RVR  -- consulted Cardiology given new diagnosis, appreciate their assistance  -- started on Eliquis, [id number] 2  -- continue with Metoprolol and PO Diltiazem as per Cardiology  -- TTE with normal EF      Protein calorie malnutrition  Cachexia of end-stage lung disease  --Patient has low albumin, appears cachectic likely secondary to advanced lung disease  - Nutrition consult for assessment of  malnutrition  -- Started Remeron for appetite stimulation      Goals of care  -- pt still full code       Low TSH  -- Likely subclinical thyroiditis with low TSH and normal free T4, in the setting of pneumonia  -- Will need repeat of thyroid function in 6 to 8 weeks.     T2DM/prediabetes  - HbA1c 6%   - LDL 88     Anxiety  -- Continue home medication     Oral thrush  - Clotrimazole troches x 7 days       Discharge Follow Up Recommendations for outpatient labs/diagnostics:  Continue oral antibiotics as prescribed.  Continue home inhalers and home oxygen.  Follow-up with PCP in 1 week.  Follow-up with infectious disease at the [facility] scheduled.  Follow-up with hematology    Day of Discharge     HPI:   Patient seen resting in bed in no acute distress.  Tolerating baseline home oxygen nasal cannula.  Discussed plan as documented above.  All questions answered.        Vital Signs:   Temp:  [97 °F (36.1 °C)-98.5 °F (36.9 °C)] 97 °F (36.1 °C)  Heart Rate:  [] 104  Resp:  [17-20] 17  BP: (107-137)/(51-54) 137/53  Flow (L/min) (Oxygen Therapy):  [3-5] 4      Physical Exam  Constitutional:       General: She is not in acute distress.  Cardiovascular:      Rate and Rhythm: Tachycardia present.      Pulses: Normal pulses.   Pulmonary:      Effort: Pulmonary effort is normal. No respiratory distress.      Comments: Diminished breath sounds  Abdominal:      General: There is no distension.      Palpations: Abdomen is soft.      Tenderness: There is no abdominal tenderness. There is no guarding or rebound.   Musculoskeletal:      Right lower leg: No edema.      Left lower leg: No edema.   Skin:     General: Skin is warm.   Neurological:      Mental Status: She is alert and oriented to person, place, and time.   Psychiatric:         Mood and Affect: Mood normal.         Behavior: Behavior normal.          Pertinent  and/or Most Recent Results     LAB RESULTS:      Lab 01/18/25  0350 01/17/25  0418  01/16/25  0406 01/15/25  0417 01/14/25  0427   WBC 20.60* 26.73* 29.66* 21.94* 17.07*   HEMOGLOBIN 10.3* 9.8* 9.9* 10.1* 9.8*   HEMATOCRIT 31.2* 30.2* 30.5* 31.4* 30.6*   PLATELETS 1,149* 1,106* 977* 866* 816*   NEUTROS ABS 16.11* 22.10* 24.59* 16.61* 12.23*   IMMATURE GRANS (ABS) 0.25* 0.43* 0.42* 0.37* 0.30*   LYMPHS ABS 2.15 1.43 1.97 2.49 2.28   MONOS ABS 1.13* 1.44* 1.43* 1.31* 1.32*   EOS ABS 0.91* 1.23* 1.16* 1.07* 0.88*   MCV 80.2 81.2 81.6 82.6 82.5         Lab 01/18/25  0350 01/17/25  0413 01/16/25  0406 01/15/25  0417 01/14/25  0427   SODIUM 130* 128* 129* 131* 130*   POTASSIUM 4.4 3.8 4.0 4.2 4.1   CHLORIDE 91* 87* 91* 93* 93*   CO2 32.0* 29.0 30.0* 33.0* 31.0*   ANION GAP 7.0 12.0 8.0 5.0 6.0   BUN 6* 5* 5* 5* 4*   CREATININE 0.28* 0.23* 0.24* 0.26* 0.22*   EGFR 121.4 127.3 126.0 123.6 128.6   GLUCOSE 98 96 115* 105* 93   CALCIUM 9.3 8.9 8.4* 9.2 9.1   MAGNESIUM  --   --   --   --  2.1                     Lab 01/17/25 0413   IRON 14*   IRON SATURATION (TSAT) 8*   TIBC 176*   TRANSFERRIN 118*   FERRITIN 109.00         Brief Urine Lab Results  (Last result in the past 365 days)        Color   Clarity   Blood   Leuk Est   Nitrite   Protein   CREAT   Urine HCG        01/10/25 0730 Yellow   Clear   Negative   Negative   Negative   Negative                 Microbiology Results (last 10 days)       Procedure Component Value - Date/Time    Respiratory Panel PCR w/COVID-19(SARS-CoV-2) MARIANELA/BRONWYN/LUZ MARINA/PAD/COR/KINGS In-House, NP Swab in UTM/VTM, 2 HR TAT - Swab, Nasopharynx [[id number]]  (Normal) Collected: 01/16/25 1345    Lab Status: Final result Specimen: Swab from Nasopharynx Updated: 01/16/25 1452     ADENOVIRUS, PCR Not Detected     Coronavirus 229E Not Detected     Coronavirus HKU1 Not Detected     Coronavirus NL63 Not Detected     Coronavirus OC43 Not Detected     COVID19 Not Detected     Human Metapneumovirus Not Detected     Human Rhinovirus/Enterovirus Not Detected     Influenza A PCR Not Detected      Influenza B PCR Not Detected     Parainfluenza Virus 1 Not Detected     Parainfluenza Virus 2 Not Detected     Parainfluenza Virus 3 Not Detected     Parainfluenza Virus 4 Not Detected     RSV, PCR Not Detected     Bordetella pertussis pcr Not Detected     Bordetella parapertussis PCR Not Detected     Chlamydophila pneumoniae PCR Not Detected     Mycoplasma pneumo by PCR Not Detected    Narrative:      In the setting of a positive respiratory panel with a viral infection PLUS a negative procalcitonin without other underlying concern for bacterial infection, consider observing off antibiotics or discontinuation of antibiotics and continue supportive care. If the respiratory panel is positive for atypical bacterial infection (Bordetella pertussis, Chlamydophila pneumoniae, or Mycoplasma pneumoniae), consider antibiotic de-escalation to target atypical bacterial infection.    Beta Strep Culture, Throat - Swab, Throat [[id number]]  (Normal) Collected: 01/15/25 1333    Lab Status: Final result Specimen: Swab from Throat Updated: 01/17/25 0955     Throat Culture, Beta Strep No Beta Hemolytic Streptococcus Isolated    Narrative:      Group A Strep incidence is low in adults. Positive culture for Beta hemolytic Streptococcus species can reflect colonization and not true infection. Please correlate clinically.      MRSA Screen, PCR (Inpatient) - Swab, Nares [[id number]]  (Normal) Collected: 01/10/25 1435    Lab Status: Final result Specimen: Swab from Nares Updated: 01/10/25 1607     MRSA PCR Negative    Narrative:      The negative predictive value of this diagnostic test is high and should only be used to consider de-escalating anti-MRSA therapy. A positive result may indicate colonization with MRSA and must be correlated clinically.  MRSA Negative            CT Chest Without Contrast Diagnostic    Result Date: 1/9/2025  CT CHEST WO CONTRAST DIAGNOSTIC Date of Exam: 1/9/2025 5:35 PM EST Indication: cough, wheezing, right  sided pain with HX of MAC. Comparison: 9/20/2022 Technique: Axial CT images were obtained of the chest without contrast administration.  Reconstructed coronal and sagittal images were also obtained. Automated exposure control and iterative construction methods were used. Findings: MEDIASTINUM: Unremarkable. Aortic and heart size are normal. No mass nor pericardial effusion. CORONARY ARTERIES: No calcified atherosclerotic disease. LUNGS: There is inferior right upper lobe and right middle lobe airspace disease consistent multifocal pneumonia. This is superimposed on a background pattern of emphysema and partially calcified opacities in both lungs which are similar. Previous 12 mm right lower lobe nodule now measures 8 mm. No new/suspicious nodule is identified. PLEURAL SPACE: No effusion, mass, nor pneumothorax. LYMPH NODES: There are no pathologically enlarged lymph nodes. UPPER ABDOMEN: Unremarkable OSSEOUS STRUCTURES: Appropriate for age with no acute process identified.     Impression: 1.Right lung multifocal pneumonia 2.Decrease in size of right lower lobe pulmonary nodule. No new suspicious nodule identified. 3.Other chronic findings as discussed. Electronically Signed: Gustavo Proctor MD  1/9/2025 6:07 PM EST  Workstation ID: [id number]    XR Chest 1 View    Result Date: 1/9/2025  XR CHEST 1 VW Date of Exam: 1/9/2025 4:50 PM EST Indication: Chest Pain Triage Protocol Comparison: CT chest dated 9/20/2022 Findings: The cardiomediastinal silhouette is within normal limits. Pulmonary vascularity appears normal. The lungs are hyperinflated with with underlying COPD/emphysema. There are interstitial opacities within the right lung likely representing infectious or inflammatory process. There is biapical scarring. There is a bleb within the superior lateral aspect of the right upper lobe with surrounding thickened wall. This is new from prior exam. There is no pleural effusion.     Impression: 1.Hyperinflated lungs with  underlying COPD/emphysema. 2.Interstitial opacities within the right lung likely representing infectious or inflammatory process. 3.New bleb within the superior lateral aspect of the right upper lobe with surrounding thickening of the wall. Electronically Signed: Pito Hutton  1/9/2025 5:14 PM EST  Workstation ID: [id number]             Results for orders placed during the hospital encounter of 01/09/25    Adult Transthoracic Echo Complete W/ Cont if Necessary Per Protocol    Interpretation Summary    Left ventricular systolic function is hyperdynamic (EF > 70%).    No hemodynamically significant valvular disease present.    The right ventricular cavity is mildly dilated.    Estimated right ventricular systolic pressure from tricuspid regurgitation is normal (<35 mmHg).    There is a left pleural effusion.      Plan for Follow-up of Pending Labs/Results: Follow up with hematology  Pending Labs       Order Current Status    JAK2 V617F, reflex to CALR + MPL + E12-15 In process          Discharge Details        Discharge Medications        New Medications        Instructions Start Date   clotrimazole 10 MG moy  Commonly known as: MYCELEX   10 mg, Oral, 5 Times Daily      dilTIAZem  MG 24 hr capsule  Commonly known as: CARDIZEM CD   180 mg, Oral, Every 24 Hours Scheduled      Eliquis 5 MG tablet tablet  Generic drug: apixaban   Take 1 tablet by mouth Every 12 (Twelve) Hours as directed for Atrial Fibrillation      metoprolol tartrate 25 MG tablet  Commonly known as: LOPRESSOR   25 mg, Oral, Every 12 Hours Scheduled      minocycline 100 MG capsule  Commonly known as: MINOCIN,DYNACIN   100 mg, Oral, Every 12 Hours Scheduled      mirtazapine 15 MG tablet  Commonly known as: REMERON   15 mg, Oral, Nightly      phenol 1.4 % liquid liquid  Commonly known as: CHLORASEPTIC   1 spray, Mouth/Throat, Every 2 Hours PRN             Continue These Medications        Instructions Start Date   Fluticasone Furoate-Vilanterol  200-25 MCG/ACT inhaler  Commonly known as: BREO ELLIPTA   1 puff, Daily - RT      ipratropium-albuterol 0.5-2.5 mg/3 ml nebulizer  Commonly known as: DUO-NEB   3 mL, 4 Times Daily PRN      LORazepam 0.5 MG tablet  Commonly known as: ATIVAN   0.5 mg, 2 Times Daily PRN      pantoprazole 40 MG EC tablet  Commonly known as: PROTONIX   40 mg, Nightly             Stop These Medications      azithromycin 500 MG tablet  Commonly known as: ZITHROMAX     rifAMPin 300 MG capsule  Commonly known as: RIFADIN              Allergies   Allergen Reactions    Codeine     Fluticasone-Salmeterol     Penicillins          Discharge Disposition:  Home or Self Care    Diet:  Hospital:  Diet Order   Procedures    Diet: Regular/House; Fluid Consistency: Thin (IDDSI 0)            Activity: As tolerated      Restrictions or Other Recommendations:  As tolerated        CODE STATUS:    Code Status and Medical Interventions: CPR (Attempt to Resuscitate); Full Support   Ordered at: 01/09/25 2009     Level Of Support Discussed With:    Patient     Code Status (Patient has no pulse and is not breathing):    CPR (Attempt to Resuscitate)     Medical Interventions (Patient has pulse or is breathing):    Full Support       Future Appointments   Date Time Provider Department Center   2/5/2025  9:45 AM Xipin TECH [facility]   2/5/2025 10:00 AM [facility], PFT LAB 1 [facility]   2/5/2025 10:30 AM Alex Estrada,  [facility]       Additional Instructions for the Follow-ups that You Need to Schedule       Ambulatory Referral to Home Health   As directed      Face to Face Visit Date: 1/15/2025   Follow-up provider for Plan of Care?: I treated the patient in an acute care facility and will not continue treatment after discharge.   Follow-up provider: FELY CUEVA [[id number]]   Reason/Clinical Findings: Impaired functional mobility, endurance, balance, and gait.   Describe mobility limitations that  make leaving home difficult: Impaired functional mobility, endurance, balance, and gait.   Nursing/Therapeutic Services Requested: Skilled Nursing Physical Therapy Occupational Therapy   Skilled nursing orders: Medication education Cardiopulmonary assessments   PT orders: Therapeutic exercise Gait Training Transfer training Strengthening Ultrasound Home safety assessment   Weight Bearing Status: As Tolerated   Occupational orders: Activities of daily living Energy conservation Strengthening Home safety assessment   Frequency: 1 Week 1        Discharge Follow-up with PCP   As directed       Currently Documented PCP:    Moiz Nova MD    PCP Phone Number:    [phone]     Follow Up Details: Follow-up with PCP in 1 week        Discharge Follow-up with Specialty: Follow-up with infectious disease as scheduled at [facility].  Follow-up with primary cardiologist in 4 weeks   As directed      Specialty: Follow-up with infectious disease as scheduled at [facility].  Follow-up with primary cardiologist in 4 weeks                      Wojciech Woodson DO  01/20/25      Time Spent on Discharge:  I spent  35  minutes on this discharge activity which included: face-to-face encounter with the patient, reviewing the data in the system, coordination of the care with the nursing staff as well as consultants, documentation, and entering orders.            Electronically signed by Wojciech Woodson DO at 01/20/25 1058       Discharge Order (From admission, onward)       Start     Ordered    01/20/25 0851  Discharge patient  Once        Expected Discharge Date: 01/20/25   Expected Discharge Time: Midday   Discharge Disposition: Home or Self Care   Physician of Record for Attribution - Please select from Treatment Team: WOJCIECH WOODSON [[id number]]   Review needed by CMO to determine Physician of Record: No      Question Answer Comment   Physician of Record for Attribution - Please select from Treatment Team WOJCIECH WOODSON     Review needed by CMO to determine Physician of Record No        01/20/25 0857    01/15/25 0823  Discharge patient  Once,   Status:  Canceled        Expected Discharge Date: 01/15/25   Discharge Disposition: Home or Self Care   Physician of Record for Attribution - Please select from Treatment Team: CLAUDE MARMOLEJO [[id number]]   Review needed by CMO to determine Physician of Record: No      Question Answer Comment   Physician of Record for Attribution - Please select from Treatment Team CLAUDE MARMOLEJO    Review needed by CMO to determine Physician of Record No        01/15/25 0822

## 2025-01-27 ENCOUNTER — READMISSION MANAGEMENT (OUTPATIENT)
Dept: CALL CENTER | Facility: HOSPITAL | Age: 65
End: 2025-01-27
Payer: COMMERCIAL

## 2025-01-27 NOTE — OUTREACH NOTE
COPD/PN Week 1 Survey      Flowsheet Row Responses   [facility] facility patient discharged from? [facility]   Does the patient have one of the following disease processes/diagnoses(primary or secondary)? Pneumonia   Week 1 attempt successful? No   Unsuccessful attempts Attempt 1            GURMEET THOMASON - Registered Nurse

## 2025-01-29 LAB
CALR EXON 9 MUT ANL BLD/T: NORMAL
CITATION REF LAB TEST: NORMAL
JAK2 GENE MUT ANL BLD/T: NORMAL
JAK2 P.V617F BLD/T QL: NORMAL
LAB DIRECTOR NAME PROVIDER: NORMAL
MPL GENE MUT TESTED MAR: NORMAL
REF LAB TEST METHOD: NORMAL
REFLEX: NORMAL
SPECIMEN TYPE: NORMAL
TEST PERFORMANCE INFO SPEC: NORMAL

## 2025-01-31 ENCOUNTER — READMISSION MANAGEMENT (OUTPATIENT)
Dept: CALL CENTER | Facility: HOSPITAL | Age: 65
End: 2025-01-31
Payer: COMMERCIAL

## 2025-01-31 NOTE — OUTREACH NOTE
COPD/PN Week 1 Survey      Flowsheet Row Responses   [facility] [place] patient discharged from? [place]   Does the patient have one of the following disease processes/diagnoses(primary or secondary)? Pneumonia   Week 1 attempt successful? Yes   Call start time 0916   Call end time 0921   Discharge diagnosis Multifocal pneumonia   Person spoke with today (if not patient) and relationship patient   Meds reviewed with patient/caregiver? Yes   Is the patient having any side effects they believe may be caused by any medication additions or changes? No   Does the patient have all medications ordered at discharge? Yes   Is the patient taking all medications as directed (includes completed medication regime)? Yes   Comments regarding appointments Patient has a hematology/oncology appt scheduled on 2/11   Does the patient have a primary care provider?  Yes   Does the patient have an appointment with their PCP or specialist within 7 days of discharge? Yes   Comments regarding PCP Moiz Nova, MDPCP - General   Has the patient kept scheduled appointments due by today? Yes   Psychosocial issues? No   Did the patient receive a copy of their discharge instructions? Yes   Nursing interventions Reviewed instructions with patient   What is the patient's perception of their health status since discharge? Improving  [Patient is slowly improving]   Nursing Interventions Nurse provided patient education  [She is slowly improving but has been a slow process]   Is the patient/caregiver able to teach back the hierarchy of who to call/visit for symptoms/problems? PCP, Specialist, Home health nurse, Urgent Care, ED, 911 Yes   Is the patient/caregiver able to teach back signs and symptoms of worsening condition: Shortness of breath, Chest pain, Fever/chills   Week 1 call completed? Yes   Graduated Yes   Is the patient interested in additional calls from an ambulatory ? No   Would this patient benefit from a Referral to  Amb Social Work? No   Wrap up additional comments Patient denies any needs, but reports she is improving very slowly.   Call end time 0910            Patrick PIERRE - Registered Nurse

## 2025-03-10 ENCOUNTER — TRANSCRIBE ORDERS (OUTPATIENT)
Dept: ADMINISTRATIVE | Facility: HOSPITAL | Age: 65
End: 2025-03-10
Payer: COMMERCIAL

## 2025-03-10 ENCOUNTER — HOSPITAL ENCOUNTER (OUTPATIENT)
Dept: GENERAL RADIOLOGY | Facility: HOSPITAL | Age: 65
Discharge: HOME | End: 2025-03-10
Admitting: INTERNAL MEDICINE
Payer: COMMERCIAL

## 2025-03-10 DIAGNOSIS — M25.462 EFFUSION OF LEFT KNEE: ICD-10-CM

## 2025-03-10 DIAGNOSIS — M23.307 OTHER MENISCUS DERANGEMENTS, UNSPECIFIED MENISCUS, LEFT KNEE: ICD-10-CM

## 2025-03-10 DIAGNOSIS — M25.562 LEFT KNEE PAIN, UNSPECIFIED CHRONICITY: ICD-10-CM

## 2025-03-10 DIAGNOSIS — M23.307 OTHER MENISCUS DERANGEMENTS, UNSPECIFIED MENISCUS, LEFT KNEE: Primary | ICD-10-CM

## 2025-03-10 PROCEDURE — 73562 X-RAY EXAM OF KNEE 3: CPT
